# Patient Record
Sex: FEMALE | Race: WHITE | Employment: OTHER | URBAN - METROPOLITAN AREA
[De-identification: names, ages, dates, MRNs, and addresses within clinical notes are randomized per-mention and may not be internally consistent; named-entity substitution may affect disease eponyms.]

---

## 2019-06-17 ENCOUNTER — TELEPHONE (OUTPATIENT)
Dept: GASTROENTEROLOGY | Facility: CLINIC | Age: 64
End: 2019-06-17

## 2019-08-15 ENCOUNTER — TELEPHONE (OUTPATIENT)
Dept: GASTROENTEROLOGY | Facility: CLINIC | Age: 64
End: 2019-08-15

## 2019-08-15 NOTE — TELEPHONE ENCOUNTER
I called patient, her neighbor noticed the yellowing of her eyes several days ago  She reports leg swelling, abd pain at times  She is still taking lactulose, not sure about the xifaxin/diuretics? She sounded somewhat confused during our conversation  She reports only 2 bm's per day  She was last seen 10/2018  I advised she present to ED or she needs to be seen in the office   She agreed to be seen by Sloane Brar tomorrow at 2 pm

## 2019-08-15 NOTE — TELEPHONE ENCOUNTER
Pt left  mssg stating she wants to spk w/ a nurse; asks when she can come in?; both legs are swelling and water pills are not helping; eyes are very yellow; asks for [phone]

## 2019-08-16 ENCOUNTER — OFFICE VISIT (OUTPATIENT)
Dept: GASTROENTEROLOGY | Facility: CLINIC | Age: 64
End: 2019-08-16
Payer: COMMERCIAL

## 2019-08-16 VITALS
SYSTOLIC BLOOD PRESSURE: 124 MMHG | BODY MASS INDEX: 29.95 KG/M2 | HEIGHT: 63 IN | WEIGHT: 169 LBS | HEART RATE: 94 BPM | DIASTOLIC BLOOD PRESSURE: 70 MMHG

## 2019-08-16 DIAGNOSIS — R17 JAUNDICE: ICD-10-CM

## 2019-08-16 DIAGNOSIS — Z12.11 SCREENING FOR COLON CANCER: ICD-10-CM

## 2019-08-16 DIAGNOSIS — K70.11 ASCITES DUE TO ALCOHOLIC HEPATITIS: ICD-10-CM

## 2019-08-16 DIAGNOSIS — K72.90 HEPATIC ENCEPHALOPATHY (HCC): ICD-10-CM

## 2019-08-16 DIAGNOSIS — I85.00 ESOPHAGEAL VARICES WITHOUT BLEEDING, UNSPECIFIED ESOPHAGEAL VARICES TYPE (HCC): ICD-10-CM

## 2019-08-16 DIAGNOSIS — K70.31 ALCOHOLIC CIRRHOSIS OF LIVER WITH ASCITES (HCC): Primary | ICD-10-CM

## 2019-08-16 PROBLEM — K76.82 HEPATIC ENCEPHALOPATHY (HCC): Status: ACTIVE | Noted: 2019-08-16

## 2019-08-16 PROBLEM — K70.30 ALCOHOLIC CIRRHOSIS (HCC): Status: ACTIVE | Noted: 2019-08-16

## 2019-08-16 PROBLEM — R18.8 ASCITES: Status: ACTIVE | Noted: 2019-08-16

## 2019-08-16 PROCEDURE — 99214 OFFICE O/P EST MOD 30 MIN: CPT | Performed by: NURSE PRACTITIONER

## 2019-08-16 RX ORDER — METOPROLOL SUCCINATE 25 MG/1
12.5 TABLET, EXTENDED RELEASE ORAL DAILY
COMMUNITY

## 2019-08-16 RX ORDER — FUROSEMIDE 20 MG/1
20 TABLET ORAL EVERY OTHER DAY
COMMUNITY
End: 2019-09-13 | Stop reason: SDUPTHER

## 2019-08-16 RX ORDER — GABAPENTIN 300 MG/1
300 CAPSULE ORAL
COMMUNITY

## 2019-08-16 RX ORDER — ALPRAZOLAM 0.5 MG/1
TABLET ORAL 3 TIMES DAILY
COMMUNITY

## 2019-08-16 RX ORDER — SPIRONOLACTONE 50 MG/1
50 TABLET, FILM COATED ORAL 2 TIMES DAILY
COMMUNITY

## 2019-08-16 RX ORDER — FLUTICASONE FUROATE AND VILANTEROL 100; 25 UG/1; UG/1
1 POWDER RESPIRATORY (INHALATION) DAILY
COMMUNITY

## 2019-08-16 RX ORDER — PANTOPRAZOLE SODIUM 40 MG/1
40 TABLET, DELAYED RELEASE ORAL 2 TIMES DAILY
COMMUNITY
End: 2019-09-13

## 2019-08-16 RX ORDER — FOLIC ACID 1 MG/1
TABLET ORAL DAILY
COMMUNITY

## 2019-08-16 NOTE — PROGRESS NOTES
[id number] - Outpatient Follow-up Note  Noah Vazquez 58 y o  female MRN: [id number]  Encounter: [id number]    ASSESSMENT AND PLAN:      1  Alcoholic cirrhosis of liver with ascites (HCC)   Decompensated over the past several years with hepatic encephalopathy, small nonbleeding esophageal varices, thrombocytopenia and ascites  Last CT scan in January 2017 showed cirrhosis without any evidence of HCC  A more recent abdominal ultrasound showed a questionable right lobe liver mass but a subsequent MRI was negative for any liver lesions  Alpha fetoprotein level was also within normal range  Unfortunately, she continues to drink at least 3-6 glasses of hard liquor daily and has not been compliant with medications or treatment recommendations  - CBC and differential; Future  - Comprehensive metabolic panel; Future  - Ammonia; Future  - Protime-INR; Future  - AFP tumor marker; Future      2  Ascites due to alcoholic hepatitis  Shifting dullness on today's exam suggest recurrent ascites  She is unclear as to whether she is taking diuretics as advised  3  Jaundice  New onset of jaundice over the past few weeks  I am concerned that she has alcoholic hepatitis  - initially was going to order blood work as an outpatient but the patient is extremely weak, barely able to ambulate and jaundiced and therefore I suggested emergency room visitation for further workup possibly steroids      6  Screening for colon cancer  Last colonoscopy performed in 215 showed internal hemorrhoids only  A 10 year recall advised  Followup Appointment:  4-6 weeks  ______________________________________________________________________    Chief Complaint   Patient presents with    Follow up-cirrhosis, legs are swollen, eye's and skin yellow     HPI:    Density office for worsening jaundiced over the past few weeks    Also has noticed increased abdominal distention and bilateral lower extremity edema   She has been drinking 3-6 alcoholic drinks a day typically vodka  She has been extremely fatigued and weak  She has gained approximately 10 lb over the past several weeks  Denies any pruritus, abdominal pain, nausea, vomiting, melena, rectal bleeding or change in bowel habits    Historical Information   Past Medical History:   Diagnosis Date    Anemia     Anxiety     Ascites     Cervical cancer (HCC)     Chronic pain     Cirrhosis (HCC)     CVA (cerebral vascular accident) ([address] )     Depression     Fibromyalgia     Hepatic encephalopathy (HCC)      Past Surgical History:   Procedure Laterality Date    COLONOSCOPY  03/15/2013    Hemorrhoids    HYSTERECTOMY       Social History     Substance and Sexual Activity   Alcohol Use Yes    Frequency: 2-3 times a week    Comment: Admits to drinking 3-6 glasses of hard liquor day     Social History     Substance and Sexual Activity   Drug Use Not on file     Social History     Tobacco Use   Smoking Status Current Every Day Smoker   Smokeless Tobacco Never Used     Family History   Problem Relation Age of Onset    Cancer Sister     Colon polyps Neg Hx     Colon cancer Neg Hx          Current Outpatient Medications:     ALPRAZolam (XANAX) 0 5 mg tablet    fluticasone-vilanterol (BREO ELLIPTA) 100-25 mcg/inh inhaler    folic acid (FOLVITE) 1 mg tablet    furosemide (LASIX) 20 mg tablet    gabapentin (NEURONTIN) 300 mg capsule    Ipratropium-Albuterol (COMBIVENT IN)    LACTULOSE PO    metoprolol succinate (TOPROL-XL) 25 mg 24 hr tablet    pantoprazole (PROTONIX) 40 mg tablet    rifaximin (XIFAXAN) 550 mg tablet    spironolactone (ALDACTONE) 50 mg tablet  Allergies   Allergen Reactions    Gadolinium     Nsaids     Other Palpitations     All anti depressants       Positive for fatigue, night sweats, sore throat, sinus pain, hoarseness, cough, shortness of breath at rest, shortness of breath on exertion, chest pain at rest, chest pain with exertion, irregular heart rate, palpitations, decreased appetite, easy bruising, prolonged bleeding, painful joints, swollen joints, leg cramps, joint stiffness, muscle aches, chronic pain, itching, discoloration, involuntary movements, numbness/tingling, loss of strength, anxiety, insomnia with the exception of fever, weight loss and swollen glands    PHYSICAL EXAM:    Blood pressure 124/70, pulse 94, height 5' 3" (1 6 m), weight 76 7 kg (169 lb)  Body mass index is 29 94 kg/m²  General Appearance:  Alert, cooperative, no distress  Head:  Normocephalic  Eyes : icteric  Neck: Supple, symmetrical, trachea midline  Lungs: Clear to auscultation bilaterally; no rales, rhonchi or wheezing; respirations unlabored   Heart: Regular rate and rhythm; no murmur, rub, or gallop  Abdomen:   positive for shifting dullness and ventral hernia, normal bowel sounds; no masses, no organomegaly   Rectal:  Deferred   Extremities:  +2 bilateral lower extremity edema   Skin:  jaundice  Back: No CVA tenderness  Neuro:  No asterixis    Lab Results:   No results found for: WBC, HGB, HCT, MCV, PLT  No results found for: NA, K, CL, CO2, ANIONGAP, BUN, CREATININE, GLUCOSE, GLUF, CALCIUM, CORRECTEDCA, AST, ALT, ALKPHOS, PROT, BILITOT, EGFR  No results found for: IRON, TIBC, FERRITIN  No results found for: LIPASE    Radiology Results:   No results found

## 2019-08-22 DIAGNOSIS — K72.90 HEPATIC ENCEPHALOPATHY (HCC): Primary | ICD-10-CM

## 2019-08-22 NOTE — TELEPHONE ENCOUNTER
Carlotta Skiff is non formulary for [employer]    Xifaxin 550 BID was prescribed  We can not PA this med for patient  Need alternative rx please to [facility] ([facility])

## 2019-08-28 NOTE — TELEPHONE ENCOUNTER
Tommy first approved xifaxin 550 mg qty: 68 Duration: 34 days approved for 12 months from 8/26/19-8/26/20

## 2019-09-13 ENCOUNTER — OFFICE VISIT (OUTPATIENT)
Dept: GASTROENTEROLOGY | Facility: CLINIC | Age: 64
End: 2019-09-13
Payer: COMMERCIAL

## 2019-09-13 VITALS
DIASTOLIC BLOOD PRESSURE: 65 MMHG | BODY MASS INDEX: 27.64 KG/M2 | HEART RATE: 69 BPM | HEIGHT: 63 IN | WEIGHT: 156 LBS | SYSTOLIC BLOOD PRESSURE: 118 MMHG

## 2019-09-13 DIAGNOSIS — B37.81 ESOPHAGEAL CANDIDIASIS (HCC): ICD-10-CM

## 2019-09-13 DIAGNOSIS — Z12.11 SCREENING FOR COLON CANCER: ICD-10-CM

## 2019-09-13 DIAGNOSIS — I85.00 ESOPHAGEAL VARICES WITHOUT BLEEDING, UNSPECIFIED ESOPHAGEAL VARICES TYPE (HCC): ICD-10-CM

## 2019-09-13 DIAGNOSIS — K70.31 ALCOHOLIC CIRRHOSIS OF LIVER WITH ASCITES (HCC): Primary | ICD-10-CM

## 2019-09-13 DIAGNOSIS — K72.90 HEPATIC ENCEPHALOPATHY (HCC): ICD-10-CM

## 2019-09-13 PROCEDURE — 99214 OFFICE O/P EST MOD 30 MIN: CPT | Performed by: INTERNAL MEDICINE

## 2019-09-13 RX ORDER — PANTOPRAZOLE SODIUM 40 MG/1
40 TABLET, DELAYED RELEASE ORAL DAILY
Qty: 30 TABLET | Refills: 2 | Status: SHIPPED | OUTPATIENT
Start: 2019-09-13

## 2019-09-13 RX ORDER — FUROSEMIDE 20 MG/1
20 TABLET ORAL 2 TIMES DAILY
Qty: 60 TABLET | Refills: 1 | Status: SHIPPED | OUTPATIENT
Start: 2019-09-13 | End: 2019-09-13

## 2019-09-13 RX ORDER — THIAMINE MONONITRATE (VIT B1) 100 MG
100 TABLET ORAL DAILY
COMMUNITY

## 2019-09-13 NOTE — PROGRESS NOTES
[facility] - Outpatient Follow-up Note  Tom Resendez 58 y o  female MRN: [id number]  Encounter: [id number]    ASSESSMENT AND PLAN:      1  Alcoholic cirrhosis of liver with ascites (HCC)   Decompensated over the past several years with hepatic encephalopathy, small nonbleeding esophageal varices, thrombocytopenia and ascites  Last CT scan in January 2017 showed cirrhosis without any evidence of HCC  A more recent abdominal ultrasound showed a questionable right lobe liver mass but a subsequent MRI was negative for any liver lesions  Alpha fetoprotein level was also within normal range  Recent EGD 8/19 showed portal hypertensive gastropathy and a small esophageal varix in the mid esophagus  Unfortunately, in the past she continued to drink at least 3-6 glasses of hard liquor daily and has not been compliant with medications or treatment recommendations  She recently presented to the hospital with worsening liver disease  She has been abstinent from alcohol since 08/17/2019       - continue Aldactone and Furosemide  - continue Protonix 40 mg daily  - continue Rifaximin and Lactulose    2  Esophageal varices without bleeding, unspecified esophageal varices type ([address] )  Recent EGD 8/19 showed portal hypertensive gastropathy and a small esophageal varix in the mid esophagus  Surveillance upper endoscopy advised in 2 years  - will arrange for an repeat endoscopy in August of 2021      4  Esophageal candidiasis (HCC)    Esophageal candidiasis noted on recent upper endoscopy performed while she was in the hospital  Completed 10 day course of Diflucan  5  Hepatic encephalopathy (HCC)  No evidence of asterixis on today's examination  - continue rifaximin and lactulose    3  Screening for colon cancer  Last colonoscopy performed in 2013 showed internal hemorrhoids only  A 10 year recall advised        Followup Appointment:  4 weeks  ______________________________________________________________________    Chief Complaint   Patient presents with    Follow-up     HPI:    Follow-up to recent hospitalization when she presented with a new onset of jaundice  She had been drinking 4-5 drinks of hard liquor for the past 2 years  She did have a period of sobriety from [id number]  She has not had any alcohol since 8/17 the day prior to going the hospital   She has lost 10 lb over the past few months but attributes this to diuretics  Appetite suboptimal   Denies any nausea, vomiting, hematemesis, rectal bleeding, melena or change in bowel habits      Historical Information   Past Medical History:   Diagnosis Date    Anemia     Anxiety     Ascites     Cervical cancer (HCC)     Chronic pain     Cirrhosis (HCC)     [facility]     Depression     Fibromyalgia     Hepatic encephalopathy [facility]      Past Surgical History:   Procedure Laterality Date    COLONOSCOPY  07/14/2015    COLONOSCOPY  03/15/2013    Hemorrhoids    EGD  12/23/2014    HYSTERECTOMY       Social History     Substance and Sexual Activity   Alcohol Use Not Currently    Comment: Had been drinking anywhere from 3-5 drinks of hard alcohol daily for the past 2 years, quit 8/17     Social History     Substance and Sexual Activity   Drug Use Not on file     Social History     Tobacco Use   Smoking Status Current Every Day Smoker   Smokeless Tobacco Never Used     Family History   Problem Relation Age of Onset    Lung cancer Mother     Cirrhosis Father     Cancer Sister     Colon polyps Neg Hx     Colon cancer Neg Hx          Current Outpatient Medications:     ALPRAZolam (XANAX) 0 5 mg tablet    fluticasone-vilanterol (BREO ELLIPTA) 100-25 mcg/inh inhaler    folic acid (FOLVITE) 1 mg tablet    furosemide (LASIX) 20 mg tablet    gabapentin (NEURONTIN) 300 mg capsule    Ipratropium-Albuterol (COMBIVENT IN)    LACTULOSE PO    metoprolol succinate (TOPROL-XL) 25 mg 24 hr tablet    rifaximin (XIFAXAN) 550 mg tablet    spironolactone (ALDACTONE) 50 mg tablet    thiamine (VITAMIN B1) 100 mg tablet    pantoprazole (PROTONIX) 40 mg tablet  Allergies   Allergen Reactions    Gadolinium     Nsaids     Other Palpitations     All anti depressants     ROS- positive for fatigue, lethargy, weight loss, otherwise 10 point review of systems normal    PHYSICAL EXAM:    Blood pressure 118/65, pulse 69, height 5' 3" (1 6 m), weight 70 8 kg (156 lb)  Body mass index is 27 63 kg/m²  General Appearance: NAD, cooperative, alert  Eyes:  Slightly icteric, PERRLA, EOMI  ENT:  Normocephalic, atraumatic, normal mucosa  Neck:  Supple, symmetrical, trachea midline  Resp:  Clear to auscultation bilaterally; no rales, rhonchi or wheezing; respirations unlabored   CV:  S1 S2, Regular rate and rhythm; no murmur, rub, or gallop  GI:  Soft, non-tender, non-distended; normal bowel sounds; no masses, no organomegaly   Rectal: Deferred  Musculoskeletal: No cyanosis, clubbing or edema  Normal ROM  Skin:  No jaundice, rashes, or lesions   Heme/Lymph: No palpable cervical lymphadenopathy  Psych: Normal affect, good eye contact  Neuro: No gross deficits, AAOx3, no asterixis  Slow to respond to questions    Lab Results:   No results found for: WBC, HGB, HCT, MCV, PLT  No results found for: NA, K, CL, CO2, ANIONGAP, BUN, CREATININE, GLUCOSE, GLUF, CALCIUM, CORRECTEDCA, AST, ALT, ALKPHOS, PROT, BILITOT, EGFR  No results found for: IRON, TIBC, FERRITIN  No results found for: LIPASE    Radiology Results:   No results found

## 2019-10-07 RX ORDER — FUROSEMIDE 20 MG/1
TABLET ORAL
Qty: 60 TABLET | Refills: 1 | Status: SHIPPED | OUTPATIENT
Start: 2019-10-07

## 2019-10-11 LAB
AFP-TM SERPL-MCNC: 7.2 NG/ML (ref 0–8.3)
ALBUMIN SERPL-MCNC: 2.9 G/DL (ref 3.6–4.8)
ALBUMIN/GLOB SERPL: 0.9 {RATIO} (ref 1.2–2.2)
ALP SERPL-CCNC: 98 IU/L (ref 39–117)
ALT SERPL-CCNC: 26 IU/L (ref 0–32)
AMMONIA PLAS-MCNC: 132 UG/DL (ref 19–87)
AST SERPL-CCNC: 56 IU/L (ref 0–40)
BASOPHILS # BLD AUTO: NORMAL 10*3/UL
BILIRUB SERPL-MCNC: 2.2 MG/DL (ref 0–1.2)
BUN SERPL-MCNC: 9 MG/DL (ref 8–27)
BUN/CREAT SERPL: 11 (ref 12–28)
CALCIUM SERPL-MCNC: 9.8 MG/DL (ref 8.7–10.3)
CHLORIDE SERPL-SCNC: 103 MMOL/L (ref 96–106)
CO2 SERPL-SCNC: 28 MMOL/L (ref 20–29)
CREAT SERPL-MCNC: 0.83 MG/DL (ref 0.57–1)
EOSINOPHIL # BLD AUTO: NORMAL 10*3/UL
EOSINOPHIL NFR BLD AUTO: NORMAL %
GLOBULIN SER-MCNC: 3.4 G/DL (ref 1.5–4.5)
GLUCOSE SERPL-MCNC: 140 MG/DL (ref 65–99)
HCT VFR BLD AUTO: NORMAL %
HGB BLD-MCNC: NORMAL G/DL
INR PPP: 1.4 (ref 0.8–1.2)
LYMPHOCYTES # BLD AUTO: NORMAL 10*3/UL
LYMPHOCYTES NFR BLD AUTO: NORMAL %
MONOCYTES NFR BLD AUTO: NORMAL %
NEUTROPHILS NFR BLD AUTO: NORMAL %
PLATELET # BLD AUTO: NORMAL 10*3/UL
POTASSIUM SERPL-SCNC: 3.7 MMOL/L (ref 3.5–5.2)
PROT SERPL-MCNC: 6.3 G/DL (ref 6–8.5)
PROTHROMBIN TIME: 13.8 SEC (ref 9.1–12)
RBC # BLD AUTO: NORMAL 10*6/UL
SL AMB EGFR AFRICAN AMERICAN: 86 ML/MIN/1.73
SL AMB EGFR NON AFRICAN AMERICAN: 75 ML/MIN/1.73
SODIUM SERPL-SCNC: 143 MMOL/L (ref 134–144)
WBC # BLD AUTO: NORMAL X10E3/UL (ref 3.4–10.8)

## 2019-10-17 NOTE — RESULT ENCOUNTER NOTE
Results of blood work to include ammonia given to patient  She has not been taking lactulose for few days  She does tell me that her family has noticed she has been more confused over the past few months  Told her to take lactulose 30 cc to 3 times a day    She has a follow-up appointment with Dr Alvarez in 2 weeks

## 2020-11-08 ENCOUNTER — HOSPITAL ENCOUNTER (EMERGENCY)
Facility: HOSPITAL | Age: 65
Discharge: HOME | End: 2020-11-08
Attending: EMERGENCY MEDICINE | Admitting: EMERGENCY MEDICINE
Payer: MEDICARE

## 2020-11-08 VITALS
WEIGHT: 156 LBS | SYSTOLIC BLOOD PRESSURE: 146 MMHG | RESPIRATION RATE: 20 BRPM | HEART RATE: 78 BPM | TEMPERATURE: 98.3 F | OXYGEN SATURATION: 100 % | BODY MASS INDEX: 27.63 KG/M2 | DIASTOLIC BLOOD PRESSURE: 68 MMHG

## 2020-11-08 DIAGNOSIS — L08.9 WOUND INFECTION: Primary | ICD-10-CM

## 2020-11-08 DIAGNOSIS — T14.8XXA WOUND INFECTION: Primary | ICD-10-CM

## 2020-11-08 PROCEDURE — 99284 EMERGENCY DEPT VISIT MOD MDM: CPT | Performed by: EMERGENCY MEDICINE

## 2020-11-08 PROCEDURE — 99282 EMERGENCY DEPT VISIT SF MDM: CPT

## 2020-11-08 RX ORDER — CEPHALEXIN 500 MG/1
500 CAPSULE ORAL EVERY 8 HOURS SCHEDULED
Qty: 21 CAPSULE | Refills: 0 | Status: SHIPPED | OUTPATIENT
Start: 2020-11-08 | End: 2020-11-15

## 2020-11-23 RX ORDER — SPIRONOLACTONE 50 MG/1
TABLET, FILM COATED ORAL
Qty: 30 TABLET | OUTPATIENT
Start: 2020-11-23

## 2021-01-04 DIAGNOSIS — K70.31 ALCOHOLIC CIRRHOSIS OF LIVER WITH ASCITES (HCC): ICD-10-CM

## 2021-01-05 NOTE — TELEPHONE ENCOUNTER
Dr Sharma Flight - you recently denied patient's spironalactone stating needs OV - please advise regarding furosemide  Thanks

## 2021-02-16 RX ORDER — FUROSEMIDE 20 MG/1
TABLET ORAL
Qty: 60 TABLET | Refills: 1 | OUTPATIENT
Start: 2021-02-16

## 2021-06-17 ENCOUNTER — NURSING HOME VISIT (OUTPATIENT)
Dept: WOUND CARE | Facility: HOSPITAL | Age: 66
End: 2021-06-17
Payer: MEDICARE

## 2021-06-17 DIAGNOSIS — R26.2 AMBULATORY DYSFUNCTION: ICD-10-CM

## 2021-06-17 DIAGNOSIS — Z78.9 PRESENCE OF SURGICAL INCISION: Primary | ICD-10-CM

## 2021-06-17 PROCEDURE — 99304 1ST NF CARE SF/LOW MDM 25: CPT | Performed by: NURSE PRACTITIONER

## 2021-06-17 NOTE — PROGRESS NOTES
[address] [facility]       Patient ID: Jyoti Joyner is a 76 y o  female Date of Birth 1955     Location of Service: [facility]    Performed wound round with: GARCIA      Chief Complaint   Patient presents with    New Patient Visit     Left great toe       Wound Instructions:  Orders Placed This Encounter   Procedures    Wound cleansing and dressings     Left great toe surgical incision  - continue current treatment recommended by surgeon     Standing Status:   Future     Standing Expiration Date:   6/17/2022       Allergies  Gadolinium, Nsaids, and Other      Assessment & Plan:  1  Presence of surgical incision  Assessment & Plan:  Location : Left great toe  S/p left hallux partial digit amputation  - sutured, intact, no obvious sign of infection  - follow up with surgeon next week    Orders:  -     Wound cleansing and dressings; Future    2  Ambulatory dysfunction  Assessment & Plan: On PT             Subjective: This is a referral for wound on the left great toe  As per medical record review the patient had Left hallux partial digit amputation on 6/2021  Severity : no sign of infection  Current treatment - lidocaine and oil emulsion      Patient's care was coordinated with nursing facility staff  Recent vitals, labs and updated medications were reviewed on EMR or chart system of facility   Past Medical, surgical, social, medication and allergy history and patient's previous records were reviewed and updated as appropriate:     Patient Active Problem List   Diagnosis    [facility]    Ascites    Jaundice    Hepatic encephalopathy [facility]    Screening for colon cancer    Esophageal varices [facility]    Esophageal candidiasis ([facility]    Presence of surgical incision    Ambulatory dysfunction     Past Medical History:   Diagnosis Date    Anemia     Anxiety     Ascites     Cervical cancer ([address] )     Chronic pain     Cirrhosis ([facility]     [facility]A [facility]ce[facility] accident) [facility] )     Depression     Fibromyalgia     Hepatic encephalopathy ([facility] )      Past Surgical History:   Procedure Laterality Date    COLONOSCOPY  07/14/2015    COLONOSCOPY  03/15/2013    Hemorrhoids    EGD  12/23/2014    HYSTERECTOMY       Social History     Socioeconomic History    Marital status:      Spouse name: None    Number of children: None    Years of education: None    Highest education level: None   Occupational History    None   Tobacco Use    Smoking status: Current Every Day Smoker    Smokeless tobacco: Never Used   Vaping Use    Vaping Use: Never assessed   Substance and Sexual Activity    Alcohol use: Not Currently     Comment: Had been drinking anywhere from 3-5 drinks of hard alcohol daily for the past 2 years, quit 8/17    Drug use: None    Sexual activity: None   Other Topics Concern    None   Social History Narrative    None     Social Determinants of Health     Financial Resource Strain:     Difficulty of Paying Living Expenses:    Food Insecurity:     Worried About Running Out of Food in the Last Year:     Ran Out of Food in the Last Year:    Transportation Needs:     Lack of Transportation (Medical):      Lack of Transportation (Non-Medical):    Physical Activity:     Days of Exercise per Week:     Minutes of Exercise per Session:    Stress:     Feeling of Stress :    Social Connections:     Frequency of Communication with Friends and Family:     Frequency of Social Gatherings with Friends and Family:     Attends [employer] Services:     Active Member of Clubs or Organizations:     Attends Club or Organization Meetings:     Marital Status:    Intimate Partner Violence:     Fear of Current or Ex-Partner:     Emotionally Abused:     Physically Abused:     Sexually Abused:         Current Outpatient Medications:     ALPRAZolam (XANAX) 0 5 mg tablet, Take by mouth 3 (three) times a day, Disp: , Rfl:     fluticasone-vilanterol (BREO ELLIPTA) 100-25 mcg/inh inhaler, Inhale 1 puff daily Rinse mouth after use , Disp: , Rfl:     folic acid (FOLVITE) 1 mg tablet, Take by mouth daily, Disp: , Rfl:     furosemide (LASIX) 20 mg tablet, Take 20 mg daily alternating with 40 mg daily  , Disp: 60 tablet, Rfl: 1    gabapentin (NEURONTIN) 300 mg capsule, Take 300 mg by mouth daily at bedtime, Disp: , Rfl:     Ipratropium-Albuterol (COMBIVENT IN), Inhale 2 (two) times a day, Disp: , Rfl:     LACTULOSE PO, Take 10 g by mouth daily, Disp: , Rfl:     metoprolol succinate (TOPROL-XL) 25 mg 24 hr tablet, Take 12 5 mg by mouth daily, Disp: , Rfl:     pantoprazole (PROTONIX) 40 mg tablet, Take 1 tablet (40 mg total) by mouth daily, Disp: 30 tablet, Rfl: 2    spironolactone (ALDACTONE) 50 mg tablet, Take 50 mg by mouth 2 (two) times a day, Disp: , Rfl:     thiamine (VITAMIN B1) 100 mg tablet, Take 100 mg by mouth daily, Disp: , Rfl:   Family History   Problem Relation Age of Onset    Lung cancer Mother     Cirrhosis Father     Cancer Sister     Colon polyps Neg Hx     Colon cancer Neg Hx         Review of Systems   Constitutional: Negative  HENT: Negative  Eyes: Negative  Respiratory: Negative  Cardiovascular: Negative for leg swelling  Gastrointestinal: Negative  Endocrine: Negative  Genitourinary: Negative  Musculoskeletal: Positive for gait problem  Skin: Positive for wound  See HPI   Neurological: Negative for dizziness and headaches  Psychiatric/Behavioral: Negative for behavioral problems  Objective: There were no vitals taken for this visit  Physical Exam  Constitutional:       Appearance: Normal appearance  HENT:      Head: Normocephalic and atraumatic  Nose: Nose normal    Eyes:      General:         Right eye: No discharge  Left eye: No discharge  Cardiovascular:      Rate and Rhythm: Normal rate and regular rhythm  Pulses: Normal pulses     Pulmonary:      Effort: Pulmonary effort is normal    Abdominal:      General: Bowel sounds are normal       Palpations: Abdomen is soft  Tenderness: There is no abdominal tenderness  There is no guarding  Musculoskeletal:         General: No tenderness  Cervical back: Normal range of motion  Right lower leg: No edema  Left lower leg: No edema  Comments: LROM    +DP and PT on left foot   Skin:     General: Skin is warm  Findings: Lesion present  Comments: Location [facility] great toe wound bed - 4 cm x 1 cm cm, sutured intact, approximated, exudate - no drainage, no malodor ( assess after dressing removal and cleansing), wound edge - attached to base, periwound - (+) mild redness on the dorsal area  (-) warm, (+) tenderness on palpation on the medial edge on the left great toe  Neurological:      Mental Status: She is alert  Gait: Gait abnormal    Psychiatric:         Mood and Affect: Mood normal          Behavior: Behavior normal               Procedures             Coordination of Care: ADON aware of the treatment plan    Patient / Staff education : Patient was given education on sign of infection  Patient verbalized understanding     Follow up :  Return if symptoms worsen or fail to improve        DANISH Matson

## 2021-06-17 NOTE — ASSESSMENT & PLAN NOTE
Location : Left great toe  S/p left hallux partial digit amputation  - sutured, intact, no obvious sign of infection  - follow up with surgeon next week

## 2021-06-17 NOTE — PATIENT INSTRUCTIONS
Orders Placed This Encounter   Procedures    Wound cleansing and dressings     Left great toe surgical incision  - continue current treatment recommended by surgeon     Standing Status:   Future     Standing Expiration Date:   6/17/2022

## 2021-06-24 ENCOUNTER — NURSING HOME VISIT (OUTPATIENT)
Dept: WOUND CARE | Facility: HOSPITAL | Age: 66
End: 2021-06-24
Payer: MEDICARE

## 2021-06-24 DIAGNOSIS — Z78.9 PRESENCE OF SURGICAL INCISION: Primary | ICD-10-CM

## 2021-06-24 DIAGNOSIS — R26.2 AMBULATORY DYSFUNCTION: ICD-10-CM

## 2021-06-24 PROCEDURE — 99307 SBSQ NF CARE SF MDM 10: CPT | Performed by: NURSE PRACTITIONER

## 2021-06-24 NOTE — ASSESSMENT & PLAN NOTE
Location : Left great toe  S/p left hallux partial digit amputation  - sutured, intact, no obvious sign of infection  - follow up with surgeon this week

## 2021-06-24 NOTE — PROGRESS NOTES
[address] MANAGEMENT   AND [facility]PER[facility]INE [facility]ENTER       Patient ID: Caryle Grams is a 76 y o  female Date of Birth 1955     Location of Service: [facility]    Performed wound round with: GARCIA      Chief Complaint   Patient presents with    Follow Up Wound Care Visit     left great toe       Wound Instructions:  Orders Placed This Encounter   Procedures    Wound cleansing and dressings     Left great toe surgical incision  - continue current treatment recommended by surgeon     Standing Status:   Future     Standing Expiration Date:   6/24/2022       Allergies  Gadolinium, Nsaids, and Other      Assessment & Plan:  1  Presence of surgical incision  Assessment & Plan:  Location : Left great toe  S/p left hallux partial digit amputation  - sutured, intact, no obvious sign of infection  - follow up with surgeon this week    Orders:  -     Wound cleansing and dressings; Future    2  Ambulatory dysfunction  Assessment & Plan:  - on PT             Subjective: This is a follow up with left great toe  As per report, patient have appointment with ortho this week  Patient is still complaining of pain on the left great toe  Patient's care was coordinated with nursing facility staff  Recent vitals, labs and updated medications were reviewed on EMR or chart system of facility   Past Medical, surgical, social, medication and allergy history and patient's previous records were reviewed and updated as appropriate:     Patient Active Problem List   Diagnosis    Alcoholic cirrhosis [facility]    Ascites    Jaundice    Hepatic encephalopathy ([address] )    Screening for colon cancer    Esophageal varices ([facility] )    Esophageal candidiasis ([facility] [address] )    Presence of surgical incision    Ambulatory dysfunction     Past Medical History:   Diagnosis Date    Anemia     Anxiety     Ascites     Cervical cancer ([address] )     Chronic pain     Cirrhosis ([address] )     CVA (cerebral vascular accident) ([address] )     Depression  Fibromyalgia     Hepatic encephalopathy [facility])      Past Surgical History:   Procedure Laterality Date    COLONOSCOPY  07/14/2015    COLONOSCOPY  03/15/2013    Hemorrhoids    EGD  12/23/2014    HYSTERECTOMY       Social History     Socioeconomic History    Marital status:      Spouse name: None    Number of children: None    Years of education: None    Highest education level: None   Occupational History    None   Tobacco Use    Smoking status: Current Every Day Smoker    Smokeless tobacco: Never Used   Vaping Use    Vaping Use: Never assessed   Substance and Sexual Activity    Alcohol use: Not Currently     Comment: Had been drinking anywhere from 3-5 drinks of hard alcohol daily for the past 2 years, quit 8/17    Drug use: None    Sexual activity: None   Other Topics Concern    None   Social History Narrative    None     Social Determinants of Health     Financial Resource Strain:     Difficulty of Paying Living Expenses:    Food Insecurity:     Worried About Running Out of Food in the Last Year:     Ran Out of Food in the Last Year:    Transportation Needs:     Lack of Transportation (Medical):      Lack of Transportation (Non-Medical):    Physical Activity:     Days of Exercise per Week:     Minutes of Exercise per Session:    Stress:     Feeling of Stress :    Social Connections:     Frequency of Communication with Friends and Family:     Frequency of Social Gatherings with Friends and Family:     Attends Hoahaoism Services:     Active Member of Clubs or Organizations:     Attends Club or Organization Meetings:     Marital Status:    Intimate Partner Violence:     Fear of Current or Ex-Partner:     Emotionally Abused:     Physically Abused:     Sexually Abused:         Current Outpatient Medications:     ALPRAZolam (XANAX) 0 5 mg tablet, Take by mouth 3 (three) times a day, Disp: , Rfl:     fluticasone-vilanterol (BREO ELLIPTA) 100-25 mcg/inh inhaler, Inhale 1 puff daily Rinse mouth after use , Disp: , Rfl:     folic acid (FOLVITE) 1 mg tablet, Take by mouth daily, Disp: , Rfl:     furosemide (LASIX) 20 mg tablet, Take 20 mg daily alternating with 40 mg daily  , Disp: 60 tablet, Rfl: 1    gabapentin (NEURONTIN) 300 mg capsule, Take 300 mg by mouth daily at bedtime, Disp: , Rfl:     Ipratropium-Albuterol (COMBIVENT IN), Inhale 2 (two) times a day, Disp: , Rfl:     LACTULOSE PO, Take 10 g by mouth daily, Disp: , Rfl:     metoprolol succinate (TOPROL-XL) 25 mg 24 hr tablet, Take 12 5 mg by mouth daily, Disp: , Rfl:     pantoprazole (PROTONIX) 40 mg tablet, Take 1 tablet (40 mg total) by mouth daily, Disp: 30 tablet, Rfl: 2    spironolactone (ALDACTONE) 50 mg tablet, Take 50 mg by mouth 2 (two) times a day, Disp: , Rfl:     thiamine (VITAMIN B1) 100 mg tablet, Take 100 mg by mouth daily, Disp: , Rfl:   Family History   Problem Relation Age of Onset    Lung cancer Mother     Cirrhosis Father     Cancer Sister     Colon polyps Neg Hx     Colon cancer Neg Hx         Review of Systems   Constitutional: Negative  HENT: Negative  Eyes: Negative  Respiratory: Negative  Cardiovascular: Negative for leg swelling  Gastrointestinal: Negative  Endocrine: Negative  Genitourinary: Negative  Musculoskeletal: Positive for gait problem  Skin: Positive for wound  See HPI   Neurological: Negative for dizziness and headaches  Psychiatric/Behavioral: Negative for behavioral problems  Objective: There were no vitals taken for this visit  Physical Exam  Constitutional:       Appearance: Normal appearance  HENT:      Head: Normocephalic and atraumatic  Nose: Nose normal    Eyes:      General:         Right eye: No discharge  Left eye: No discharge  Pulmonary:      Effort: Pulmonary effort is normal    Abdominal:      General: Bowel sounds are normal       Palpations: Abdomen is soft  Tenderness:  There is no abdominal tenderness  There is no guarding  Musculoskeletal:         General: No tenderness  Cervical back: Normal range of motion  Right lower leg: No edema  Left lower leg: No edema  Comments: LROM    +DP and PT on left foot   Skin:     General: Skin is warm  Findings: Lesion present  Comments: Location Left great toe wound bed - 4 cm x 1 cm cm, sutured intact, approximated, exudate - no drainage, no malodor ( assess after dressing removal and cleansing), wound edge - attached to base, periwound - (+) mild redness on the dorsal area  (-) warm, (+) tenderness on palpation on the medial edge on the left great toe with hypergranulation  Neurological:      Mental Status: She is alert  Gait: Gait abnormal    Psychiatric:         Mood and Affect: Mood normal          Behavior: Behavior normal               Procedures               Coordination of Care: ADON aware of the treatment plan    Follow up :  Return if symptoms worsen or fail to improve        DANISH Vazquez

## 2021-06-24 NOTE — PATIENT INSTRUCTIONS
Orders Placed This Encounter   Procedures    Wound cleansing and dressings     Left great toe surgical incision  - continue current treatment recommended by surgeon     Standing Status:   Future     Standing Expiration Date:   6/24/2022

## 2021-07-01 ENCOUNTER — NURSING HOME VISIT (OUTPATIENT)
Dept: WOUND CARE | Facility: HOSPITAL | Age: 66
End: 2021-07-01
Payer: MEDICARE

## 2021-07-01 DIAGNOSIS — R26.2 AMBULATORY DYSFUNCTION: ICD-10-CM

## 2021-07-01 DIAGNOSIS — Z78.9 PRESENCE OF SURGICAL INCISION: Primary | ICD-10-CM

## 2021-07-01 PROCEDURE — 99307 SBSQ NF CARE SF MDM 10: CPT | Performed by: NURSE PRACTITIONER

## 2021-07-01 NOTE — PATIENT INSTRUCTIONS
Orders Placed This Encounter   Procedures    Wound cleansing and dressings     Left great toe surgical incision  - continue current treatment recommended by surgeon  - sign off     Standing Status:   Future     Standing Expiration Date:   7/1/2022

## 2021-07-01 NOTE — ASSESSMENT & PLAN NOTE
Location : Left great toe  S/p left hallux partial digit amputation  - sutured, intact, no obvious sign of infection  - patient is for discharge to home, will continue to follow up with her surgeon  - sign off

## 2021-07-01 NOTE — PROGRESS NOTES
[address] MANAGEMENT   AND [facility]PERBARIC MED[facility]INE [facility]ENTER       Patient ID: Jarret Lopez is a 76 y o  female Date of Birth 1955     Location of Service: [facility]    Performed wound round with: ALLISON      Chief Complaint   Patient presents with    Follow Up Wound Care Visit     Left foot hallux       Wound Instructions:  Orders Placed This Encounter   Procedures    Wound cleansing and dressings     Left great toe surgical incision  - continue current treatment recommended by surgeon  - sign off     Standing Status:   Future     Standing Expiration Date:   7/1/2022       Allergies  Gadolinium, Nsaids, and Other      Assessment & Plan:  1  Presence of surgical incision  Assessment & Plan:  Location : Left great toe  S/p left hallux partial digit amputation  - sutured, intact, no obvious sign of infection  - patient is for discharge to home, will continue to follow up with her surgeon  - sign off    Orders:  -     Wound cleansing and dressings; Future    2  Ambulatory dysfunction  Assessment & Plan:  - ambulate independently             Subjective: This is a follow up with left great toe  As per patient, she was recently seen by ortho and the wound was cauterized  Denies pain  Patient's care was coordinated with nursing facility staff  Recent vitals, labs and updated medications were reviewed on EMR or chart system of facility   Past Medical, surgical, social, medication and allergy history and patient's previous records were reviewed and updated as appropriate:     Patient Active Problem List   Diagnosis    Alcoholic cirrhosis ([facility] )    Ascites    Jaundice    Hepatic encephalopathy ([facility] [address] [facility] )    Screening for colon cancer    Esophageal varices ([facility] [facility] )    Esophageal candidiasis ([facility] [facility] )    Presence of surgical incision    Ambulatory dysfunction     Past Medical History:   Diagnosis Date    Anemia     Anxiety     Ascites     Cervical cancer ([facility] [address] [facility] )     Chronic pain     Cirrhosis ([facility] [facility] )     CVA [facility]cerebral vascular accident) ([facility] )     Depression     Fibromyalgia     Hepatic encephalopathy ([facility] )      Past Surgical History:   Procedure Laterality Date    COLONOSCOPY  07/14/2015    COLONOSCOPY  03/15/2013    Hemorrhoids    EGD  12/23/2014    HYSTERECTOMY       Social History     Socioeconomic History    Marital status:      Spouse name: None    Number of children: None    Years of education: None    Highest education level: None   Occupational History    None   Tobacco Use    Smoking status: Current Every Day Smoker    Smokeless tobacco: Never Used   Vaping Use    Vaping Use: Never assessed   Substance and Sexual Activity    Alcohol use: Not Currently     Comment: Had been drinking anywhere from 3-5 drinks of hard alcohol daily for the past 2 years, quit 8/17    Drug use: None    Sexual activity: None   Other Topics Concern    None   Social History Narrative    None     Social Determinants of Health     Financial Resource Strain:     Difficulty of Paying Living Expenses:    Food Insecurity:     Worried About Running Out of Food in the Last Year:     Ran Out of Food in the Last Year:    Transportation Needs:     Lack of Transportation (Medical):      Lack of Transportation (Non-Medical):    Physical Activity:     Days of Exercise per Week:     Minutes of Exercise per Session:    Stress:     Feeling of Stress :    Social Connections:     Frequency of Communication with Friends and Family:     Frequency of Social Gatherings with Friends and Family:     Attends Latter day Services:     Active Member of Clubs or Organizations:     Attends Club or Organization Meetings:     Marital Status:    Intimate Partner Violence:     Fear of Current or Ex-Partner:     Emotionally Abused:     Physically Abused:     Sexually Abused:         Current Outpatient Medications:     ALPRAZolam (XANAX) 0 5 mg tablet, Take by mouth 3 (three) times a day, Disp: , Rfl:     fluticasone-vilanterol (BREO ELLIPTA) 100-25 mcg/inh inhaler, Inhale 1 puff daily Rinse mouth after use , Disp: , Rfl:     folic acid (FOLVITE) 1 mg tablet, Take by mouth daily, Disp: , Rfl:     furosemide (LASIX) 20 mg tablet, Take 20 mg daily alternating with 40 mg daily  , Disp: 60 tablet, Rfl: 1    gabapentin (NEURONTIN) 300 mg capsule, Take 300 mg by mouth daily at bedtime, Disp: , Rfl:     Ipratropium-Albuterol (COMBIVENT IN), Inhale 2 (two) times a day, Disp: , Rfl:     LACTULOSE PO, Take 10 g by mouth daily, Disp: , Rfl:     metoprolol succinate (TOPROL-XL) 25 mg 24 hr tablet, Take 12 5 mg by mouth daily, Disp: , Rfl:     pantoprazole (PROTONIX) 40 mg tablet, Take 1 tablet (40 mg total) by mouth daily, Disp: 30 tablet, Rfl: 2    spironolactone (ALDACTONE) 50 mg tablet, Take 50 mg by mouth 2 (two) times a day, Disp: , Rfl:     thiamine (VITAMIN B1) 100 mg tablet, Take 100 mg by mouth daily, Disp: , Rfl:   Family History   Problem Relation Age of Onset    Lung cancer Mother     Cirrhosis Father     Cancer Sister     Colon polyps Neg Hx     Colon cancer Neg Hx         Review of Systems   Constitutional: Negative  HENT: Negative  Eyes: Negative  Respiratory: Negative  Cardiovascular: Negative for leg swelling  Gastrointestinal: Negative  Endocrine: Negative  Genitourinary: Negative  Musculoskeletal: Positive for gait problem  Skin: Positive for wound  See HPI   Neurological: Negative for dizziness and headaches  Psychiatric/Behavioral: Negative for behavioral problems  Objective: There were no vitals taken for this visit  Physical Exam  Constitutional:       Appearance: Normal appearance  HENT:      Head: Normocephalic and atraumatic  Nose: Nose normal    Eyes:      General:         Right eye: No discharge  Left eye: No discharge     Pulmonary:      Effort: Pulmonary effort is normal    Abdominal:      General: Bowel sounds are normal       Palpations: Abdomen is soft       Tenderness: There is no abdominal tenderness  There is no guarding  Musculoskeletal:         General: No tenderness  Cervical back: Normal range of motion  Right lower leg: No edema  Left lower leg: No edema  Comments: +DP and PT on left foot   Skin:     General: Skin is warm  Findings: Lesion present  Comments: Location Left great toe wound bed - 4 cm x 1 cm cm, sutured intact, approximated, exudate - no drainage, no malodor ( assess after dressing removal and cleansing), wound edge - attached to base, periwound - no redness  (-) warm, (-) tenderness on palpation      Neurological:      Mental Status: She is alert  Gait: Gait abnormal    Psychiatric:         Mood and Affect: Mood normal          Behavior: Behavior normal               Procedures               Coordination of Care: DON aware of the treatment plan    Follow up :  Return if symptoms worsen or fail to improve        DANISH Glasgow

## 2021-07-08 ENCOUNTER — NURSING HOME VISIT (OUTPATIENT)
Dept: WOUND CARE | Facility: HOSPITAL | Age: 66
End: 2021-07-08
Payer: MEDICARE

## 2021-07-08 DIAGNOSIS — R26.2 AMBULATORY DYSFUNCTION: ICD-10-CM

## 2021-07-08 DIAGNOSIS — Z78.9 PRESENCE OF SURGICAL INCISION: Primary | ICD-10-CM

## 2021-07-08 PROCEDURE — 99307 SBSQ NF CARE SF MDM 10: CPT | Performed by: NURSE PRACTITIONER

## 2021-07-08 NOTE — ASSESSMENT & PLAN NOTE
Location : Left great toe  S/p left hallux partial digit amputation  - sutured, intact, no obvious sign of infection  - Follow up with surgeon for suture removal

## 2021-07-08 NOTE — PROGRESS NOTES
[address] MANAGEMENT   AND HYPERBARIC MEDICINE CENTER       Patient ID: Noah Vazquez is a 76 y o  female Date of Birth 1955     Location of Service: [facility]    Performed wound round with: GARCIA      Chief Complaint   Patient presents with    Follow Up Wound Care Visit     Left foot hallux       Wound Instructions:  Orders Placed This Encounter   Procedures    Wound cleansing and dressings      Left great toe surgical incision  - continue current treatment recommended by surgeon     Standing Status:   Future     Standing Expiration Date:   7/8/2022       Allergies  Gadolinium, Nsaids, and Other      Assessment & Plan:  1  Presence of surgical incision  Assessment & Plan:  Location : Left great toe  S/p left hallux partial digit amputation  - sutured, intact, no obvious sign of infection  - Follow up with surgeon for suture removal    Orders:  -     Wound cleansing and dressings; Future    2  Ambulatory dysfunction  Assessment & Plan:  - ambulate independently             Subjective: This is a follow up with left great toe  Patient's discharge was cancelled  No significant issues related to the wound  Patient's care was coordinated with nursing facility staff  Recent vitals, labs and updated medications were reviewed on EMR or chart system of facility   Past Medical, surgical, social, medication and allergy history and patient's previous records were reviewed and updated as appropriate:     Patient Active Problem List   Diagnosis    Alcoholic cirrhosis ([facility] [address] )    Ascites    Jaundice    Hepatic encephalopathy ([facility] [address] [facility] )    Screening for colon cancer    Esophageal varices ([facility] [address] [facility]    Esophageal candidiasis ([facility] [address] [facility]    Presence of surgical incision    Ambulatory dysfunction     Past Medical History:   Diagnosis Date    Anemia     Anxiety     Ascites     Cervical cancer ([facility] [address] [facility]     Chronic pain     Cirrhosis ([facility] [address] [facility]     CVA (cerebral vascular accident) ([facility] [facility] )     Depression     Fibromyalgia  Hepatic encephalopathy [facility])      Past Surgical History:   Procedure Laterality Date    COLONOSCOPY  07/14/2015    COLONOSCOPY  03/15/2013    Hemorrhoids    EGD  12/23/2014    HYSTERECTOMY       Social History     Socioeconomic History    Marital status:      Spouse name: None    Number of children: None    Years of education: None    Highest education level: None   Occupational History    None   Tobacco Use    Smoking status: Current Every Day Smoker    Smokeless tobacco: Never Used   Vaping Use    Vaping Use: Never assessed   Substance and Sexual Activity    Alcohol use: Not Currently     Comment: Had been drinking anywhere from 3-5 drinks of hard alcohol daily for the past 2 years, quit 8/17    Drug use: None    Sexual activity: None   Other Topics Concern    None   Social History Narrative    None     Social Determinants of Health     Financial Resource Strain:     Difficulty of Paying Living Expenses:    Food Insecurity:     Worried About Running Out of Food in the Last Year:     Ran Out of Food in the Last Year:    Transportation Needs:     Lack of Transportation (Medical):      Lack of Transportation (Non-Medical):    Physical Activity:     Days of Exercise per Week:     Minutes of Exercise per Session:    Stress:     Feeling of Stress :    Social Connections:     Frequency of Communication with Friends and Family:     Frequency of Social Gatherings with Friends and Family:     Attends Scientologist Services:     Active Member of Clubs or Organizations:     Attends Club or Organization Meetings:     Marital Status:    Intimate Partner Violence:     Fear of Current or Ex-Partner:     Emotionally Abused:     Physically Abused:     Sexually Abused:         Current Outpatient Medications:     ALPRAZolam (XANAX) 0 5 mg tablet, Take by mouth 3 (three) times a day, Disp: , Rfl:     fluticasone-vilanterol (BREO ELLIPTA) 100-25 mcg/inh inhaler, Inhale 1 puff daily Rinse mouth after use , Disp: , Rfl:     folic acid (FOLVITE) 1 mg tablet, Take by mouth daily, Disp: , Rfl:     furosemide (LASIX) 20 mg tablet, Take 20 mg daily alternating with 40 mg daily  , Disp: 60 tablet, Rfl: 1    gabapentin (NEURONTIN) 300 mg capsule, Take 300 mg by mouth daily at bedtime, Disp: , Rfl:     Ipratropium-Albuterol (COMBIVENT IN), Inhale 2 (two) times a day, Disp: , Rfl:     LACTULOSE PO, Take 10 g by mouth daily, Disp: , Rfl:     metoprolol succinate (TOPROL-XL) 25 mg 24 hr tablet, Take 12 5 mg by mouth daily, Disp: , Rfl:     pantoprazole (PROTONIX) 40 mg tablet, Take 1 tablet (40 mg total) by mouth daily, Disp: 30 tablet, Rfl: 2    spironolactone (ALDACTONE) 50 mg tablet, Take 50 mg by mouth 2 (two) times a day, Disp: , Rfl:     thiamine (VITAMIN B1) 100 mg tablet, Take 100 mg by mouth daily, Disp: , Rfl:   Family History   Problem Relation Age of Onset    Lung cancer Mother     Cirrhosis Father     Cancer Sister     Colon polyps Neg Hx     Colon cancer Neg Hx         Review of Systems   Constitutional: Negative  HENT: Negative  Eyes: Negative  Respiratory: Negative  Cardiovascular: Negative for leg swelling  Gastrointestinal: Negative  Endocrine: Negative  Genitourinary: Negative  Musculoskeletal: Positive for gait problem  Skin: Positive for wound  See HPI   Neurological: Negative for dizziness and headaches  Psychiatric/Behavioral: Negative for behavioral problems  Objective: There were no vitals taken for this visit  Physical Exam  Constitutional:       Appearance: Normal appearance  HENT:      Head: Normocephalic and atraumatic  Nose: Nose normal    Eyes:      General:         Right eye: No discharge  Left eye: No discharge  Pulmonary:      Effort: Pulmonary effort is normal    Abdominal:      General: Bowel sounds are normal       Palpations: Abdomen is soft  Tenderness: There is no abdominal tenderness   There is no guarding  Musculoskeletal:         General: No tenderness  Cervical back: Normal range of motion  Right lower leg: No edema  Left lower leg: No edema  Comments: +DP and PT on left foot   Skin:     General: Skin is warm  Findings: Lesion present  Comments: Location Left great toe wound bed - 4 cm x 1 cm cm, sutured intact, approximated, exudate - no drainage, no malodor ( assess after dressing removal and cleansing), wound edge - attached to base, periwound - no redness  (-) warm, (-) tenderness on palpation      Neurological:      Mental Status: She is alert  Gait: Gait abnormal    Psychiatric:         Mood and Affect: Mood normal          Behavior: Behavior normal               Procedures               Coordination of Care: ADON aware of the treatment plan    Follow up :  Return if symptoms worsen or fail to improve        DANISH Mcknight

## 2022-11-07 PROBLEM — F17.200 TOBACCO USE DISORDER: Status: ACTIVE | Noted: 2022-11-07

## 2022-11-07 PROBLEM — F33.1 MAJOR DEPRESSIVE DISORDER, RECURRENT EPISODE, MODERATE (HCC): Status: ACTIVE | Noted: 2022-11-07

## 2022-11-07 PROBLEM — F41.1 GENERALIZED ANXIETY DISORDER: Status: ACTIVE | Noted: 2022-11-07

## 2022-11-07 PROBLEM — F11.21 OPIOID DEPENDENCE IN REMISSION (HCC): Status: ACTIVE | Noted: 2022-11-07

## 2022-11-09 ENCOUNTER — TELEMEDICINE (OUTPATIENT)
Dept: PSYCHIATRY | Facility: CLINIC | Age: 67
End: 2022-11-09

## 2022-11-09 DIAGNOSIS — F17.200 TOBACCO USE DISORDER: ICD-10-CM

## 2022-11-09 DIAGNOSIS — F11.21 OPIOID DEPENDENCE IN REMISSION (HCC): ICD-10-CM

## 2022-11-09 DIAGNOSIS — F33.1 MAJOR DEPRESSIVE DISORDER, RECURRENT EPISODE, MODERATE (HCC): Primary | ICD-10-CM

## 2022-11-09 DIAGNOSIS — F41.1 GENERALIZED ANXIETY DISORDER: ICD-10-CM

## 2022-11-09 RX ORDER — ALPRAZOLAM 0.5 MG/1
TABLET ORAL
Qty: 60 TABLET | Refills: 4 | Status: SHIPPED | OUTPATIENT
Start: 2022-11-09

## 2022-11-09 NOTE — BH TREATMENT PLAN
TREATMENT PLAN (Medication Management Only)        Ibrahima Mariano    Name and Date of Birth: Roshni Nuñez 75 y o  1955  Date of Treatment Plan: November 9, 2022  Diagnosis/Diagnoses:    1  Major depressive disorder, recurrent episode, moderate ([facility] [address] )    2  Generalized anxiety disorder    3  Opio[facility] dependence in remission ([facility] )    4  Tobacco use disorder      Strengths/Personal Resources for Self-Care: supportive family, supportive friends  Area/Areas of need (in own words): anxiety  1  Long Term Goal: maintain anxiety  Target Date:6 months - 5/9/2023  Person/Persons responsible for completion of goal: Yeni Akins  2  Short Term Objective (s) - How will we reach this goal?:   A  Provider new recommended medication/dosage changes and/or continue medication(s): continue current medications as prescribed Xanax  B  N/A   C  N/A  Target Date:6 months - 5/9/2023  Person/Persons Responsible for Completion of Goal: Yeni Akins  Progress Towards Goals: stable  Treatment Modality: medication management every 4 months  Review due 180 days from date of this plan: 6 months - 5/9/2023  Expected length of service: maintenance  My Physician/PA/NP and I have developed this plan together and I agree to work on the goals and objectives  I understand the treatment goals that were developed for my treatment

## 2022-11-09 NOTE — PSYCH
Virtual Regular Visit    Verification of patient location: at home    Patient is located in the following state in which I hold an active license [place]      Assessment/Plan:       Diagnoses and all orders for this visit:    Major depressive disorder, recurrent episode, moderate (HCC)    Generalized anxiety disorder    Opioid dependence in remission ([facility] [address] )    Tobacco use disorder          Goals addressed in session:   Good Health  Counseling provided:      Treatment Recommendations- Risks Benefits       Immediate Medical/Psychiatric/Psychotherapy Treatments and Any Precautions:     Risks, Benefits And Possible Side Effects Of Medications:  Risks, benefits, and possible side effects of medications explained to patient and patient verbalizes understanding    Controlled Medication Discussion: The patient has been filling controlled prescriptions on time as prescribed to [facility]      Reason for visit is No chief complaint on file  Medication Management   Encounter provider DANISH Sánchez    Provider located at [address] [facility]  [address]  [phone]      Recent Visits  No visits were found meeting these conditions  Showing recent visits within past 7 days and meeting all other requirements  Today's Visits  Date Type Provider Dept   11/09/22 Telemedicine Madhuri Jerry [facility] today's visits and meeting all other requirements  Future Appointments  No visits were found meeting these conditions  Showing future appointments within next 150 days and meeting all other requirements       The patient was identified by name and date of birth  Olivia Cloud was informed that this is a telemedicine visit and that the visit is being conducted throughTelephone  My office door was closed  No one else was in the room    She acknowledged consent and understanding of privacy and security of the video platform  The patient has agreed to participate and understands they can discontinue the visit at any time  Patient is aware this is a billable service  Subjective    Pramod Vences is a 75 y o  female    here today for a med check  This was via phone as she does not have access to [employer]    normal appetite      HPI  Mood down as she has not felt well physically  Anxiety manageable  Son coming home with girl friend for Thanksgiving  No problems with medication  "It helps"  Appetite improving  Sleep ok  Has some health issues   Denies SI/HI    Past Medical History:   Diagnosis Date   • Anemia    • Anxiety    • Ascites    • Cervical cancer (HCC)    • Chronic pain    • Cirrhosis ([facility] [address] )    • CVA (cerebral vascular accident) ([facility] [address] )    • Depression    • Fibromyalgia    • [facility]        Past Surgical History:   Procedure Laterality Date   • COLONOSCOPY  07/14/2015   • COLONOSCOPY  03/15/2013    Hemorrhoids   • EGD  12/23/2014   • HYSTERECTOMY         Current Outpatient Medications   Medication Sig Dispense Refill   • ALPRAZolam (XANAX) 0 5 mg tablet Take by mouth 3 (three) times a day Take 1 PO BID     • fluticasone-vilanterol (BREO ELLIPTA) 100-25 mcg/inh inhaler Inhale 1 puff daily Rinse mouth after use  • folic acid (FOLVITE) 1 mg tablet Take by mouth daily     • furosemide (LASIX) 20 mg tablet Take 20 mg daily alternating with 40 mg daily   60 tablet 1   • gabapentin (NEURONTIN) 300 mg capsule Take 300 mg by mouth daily at bedtime     • Ipratropium-Albuterol (COMBIVENT IN) Inhale 2 (two) times a day     • LACTULOSE PO Take 10 g by mouth daily     • metoprolol succinate (TOPROL-XL) 25 mg 24 hr tablet Take 12 5 mg by mouth daily     • pantoprazole (PROTONIX) 40 mg tablet Take 1 tablet (40 mg total) by mouth daily 30 tablet 2   • spironolactone (ALDACTONE) 50 mg tablet Take 50 mg by mouth 2 (two) times a day     • thiamine (VITAMIN B1) 100 mg tablet Take 100 mg by mouth daily       No current facility-administered medications for this visit  Allergies   Allergen Reactions   • Gadolinium    • Nsaids    • Other Palpitations     All anti depressants       Social History     Substance and Sexual Activity   Drug Use Not on file       Family History   Problem Relation Age of Onset   • Lung cancer Mother    • Cirrhosis Father    • Cancer Sister    • Colon polyps Neg Hx    • Colon cancer Neg Hx            Objective    Mental status:  Appearance calm and cooperative  and adequate hygiene and grooming   Mood mood appropriate   Affect affect appropriate    Speech a normal rate and fluent   Thought Processes normal thought processes   Hallucinations no hallucinations present    Thought Content no delusions   Abnormal Thoughts no suicidal thoughts  and no homicidal thoughts    Orientation  oriented to person and place and time   Remote Memory short term memory intact and long term memory intact   Attention Span concentration intact   Intellect Appears to be of Average Intelligence   Insight Insight intact   Judgement judgment was intact   Muscle Strength Muscle strength and tone were normal and Normal gait    Language no difficulty naming common objects   Fund of Knowledge displays adequate knowledge of current events   Pain none   Pain Scale 0       Video Exam    There were no vitals filed for this visit      I spent 15 minutes directly with the patient during this visit    Patient Instructions   Continue Current Tx  Aware she will have to be seen in person at   F/U with medical care  Report Problems  Return 4 months       Visit Time    Visit Start Time: 1174  Visit Stop Time: 9052  Total Visit Duration: 22 min

## 2023-03-01 ENCOUNTER — TELEPHONE (OUTPATIENT)
Dept: PSYCHIATRY | Facility: CLINIC | Age: 68
End: 2023-03-01

## 2023-03-01 ENCOUNTER — DOCUMENTATION (OUTPATIENT)
Dept: PSYCHIATRY | Facility: CLINIC | Age: 68
End: 2023-03-01

## 2023-03-01 NOTE — PSYCH
[facility]    Patient Name Brittaney Abrams     Date of Birth: 75 y o  1955      MRN: [id number]    Admission Date: several years ago    Date of Transfer: March 1, 2023    Admission Diagnosis:     Major Depressive Disorder, recurrent, moderate  Generalized Anxiety Disorder    Current Diagnosis:     No diagnosis found  Reason for Admission: Jermaine Benitez presented for treatment due to depression  Primary complaints included DEPRESSIVE SYMPTOMS: unremarkable - euthymic mood  Progress in Treatment: Jermaine Benitez was seen for Medication Management  During the course of treatment she      Episodes of Higher Level of Care: No    Transfer request Initiated by: Psychiatrist: Nurse Practitioner Pramod Finney Therapist: None    Reason for Transfer Request: patient requested transfer    Does this individual need a clinician with specialized training/expertise?: No    Is this client working with any other \[facility]\"" Providers/Therapists?  Psychiatrist: None Therapist: None    Other pertinent issues: None    Are there any specific individuals who would be a “best fit” or who have already agreed to accept this transfer request?      Psychiatrist: None   Therapist: None  Rationale: Not Applicable    Attempts to maintain the current therapeutic relationship: Not Applicable    Transfer request routed to Clinical Coordinator for input and/or approval      Comments from other involved providers and/or clinical coordinator: None    Pramod Finney, CRNP03/01/23

## 2023-03-23 NOTE — PATIENT INSTRUCTIONS
Orders Placed This Encounter   Procedures    Wound cleansing and dressings      Left great toe surgical incision  - continue current treatment recommended by surgeon     Standing Status:   Future     Standing Expiration Date:   7/8/2022 no

## 2023-05-19 ENCOUNTER — TELEPHONE (OUTPATIENT)
Dept: PSYCHIATRY | Facility: CLINIC | Age: 68
End: 2023-05-19

## 2023-05-19 DIAGNOSIS — F41.1 GENERALIZED ANXIETY DISORDER: ICD-10-CM

## 2023-05-19 DIAGNOSIS — F33.1 MAJOR DEPRESSIVE DISORDER, RECURRENT EPISODE, MODERATE (HCC): ICD-10-CM

## 2023-05-19 NOTE — TELEPHONE ENCOUNTER
Voicemail message from client stating she deleted the voicemail message about setting up a med check with an in office provider  She is out of her medication  An outreach was done on 3/1/23 by the intake dept  I forwarded message to intake to set up an appointment, so refills can be sent

## 2023-05-19 NOTE — TELEPHONE ENCOUNTER
Patient in need of Alprazolam RF  Last script written 2023- over6 months old=  Last fill per PDMP:   Patient in need of in person provider and this has been brought to the attention of intake as patient has no access to video sessions  Requesting RF of med to hold over in the interim

## 2023-05-22 ENCOUNTER — DOCUMENTATION (OUTPATIENT)
Dept: PSYCHIATRY | Facility: CLINIC | Age: 68
End: 2023-05-22

## 2023-05-22 ENCOUNTER — TELEPHONE (OUTPATIENT)
Dept: PSYCHIATRY | Facility: CLINIC | Age: 68
End: 2023-05-22

## 2023-05-22 RX ORDER — ALPRAZOLAM 0.5 MG/1
TABLET ORAL
Qty: 60 TABLET | Refills: 0 | Status: SHIPPED | OUTPATIENT
Start: 2023-05-22

## 2023-05-22 NOTE — PSYCH
[facility]    Patient Name Jesus Mcmullen     Date of Birth: 75 y o  1955      MRN: [id number]    Admission Date: several years ago    Date of Transfer: May 22, 2023    Admission Diagnosis:     Major Depressive Disorder  Generalized Anxiety Disorder    Current Diagnosis:     No diagnosis found  Reason for Admission: Monique Wolfe presented for treatment due to depression and anxiety  Primary complaints included DEPRESSIVE SYMPTOMS: unremarkable - euthymic mood and ANXIETY SYMPTOMS: unremarkable  Progress in Treatment: Monique Wolfe was seen for Medication Management  During the course of treatment she      Episodes of Higher Level of Care: No    Transfer request Initiated by: Psychiatrist: Boris Padron Therapist: None    Reason for Transfer Request: patient requested transfer    Does this individual need a clinician with specialized training/expertise?: No    Is this client working with any other [facility] Providers/Therapists?  Psychiatrist: None Therapist: None    Other pertinent issues: None    Are there any specific individuals who would be a “best fit” or who have already agreed to accept this transfer request?      Psychiatrist: None   Therapist: None  Rationale: Not Applicable    Attempts to maintain the current therapeutic relationship: Not Applicable    Transfer request routed to Clinical Coordinator for input and/or approval      Comments from other involved providers and/or clinical coordinator: None    DANISH Pal05/22/23

## 2023-05-22 NOTE — TELEPHONE ENCOUNTER
Lvm regarding wait list for in person providers  clt was added to wait list and script was sent in for refill until apt can be made

## 2023-07-06 ENCOUNTER — TELEPHONE (OUTPATIENT)
Dept: PSYCHIATRY | Facility: CLINIC | Age: 68
End: 2023-07-06

## 2023-07-31 DIAGNOSIS — F33.1 MAJOR DEPRESSIVE DISORDER, RECURRENT EPISODE, MODERATE (HCC): ICD-10-CM

## 2023-07-31 DIAGNOSIS — F41.1 GENERALIZED ANXIETY DISORDER: ICD-10-CM

## 2023-07-31 RX ORDER — ALPRAZOLAM 0.5 MG/1
TABLET ORAL
Qty: 60 TABLET | Refills: 0 | Status: SHIPPED | OUTPATIENT
Start: 2023-07-31 | End: 2023-09-08 | Stop reason: SDUPTHER

## 2023-08-02 NOTE — PATIENT INSTRUCTIONS
Continue Current Tx  Aware she will have to be seen in person at [facility]  F/U with medical care  Report Problems  Return 4 months NONE

## 2023-08-30 ENCOUNTER — TELEPHONE (OUTPATIENT)
Dept: PSYCHIATRY | Facility: CLINIC | Age: 68
End: 2023-08-30

## 2023-08-30 NOTE — TELEPHONE ENCOUNTER
Tried to contact clt regarding scheduling with a new provider. t mailbox is full and unable to leave a message.

## 2023-09-06 DIAGNOSIS — F33.1 MAJOR DEPRESSIVE DISORDER, RECURRENT EPISODE, MODERATE (HCC): ICD-10-CM

## 2023-09-06 DIAGNOSIS — F41.1 GENERALIZED ANXIETY DISORDER: ICD-10-CM

## 2023-09-08 ENCOUNTER — TELEPHONE (OUTPATIENT)
Dept: PSYCHIATRY | Facility: CLINIC | Age: 68
End: 2023-09-08

## 2023-09-08 DIAGNOSIS — F41.1 GENERALIZED ANXIETY DISORDER: ICD-10-CM

## 2023-09-08 DIAGNOSIS — F33.1 MAJOR DEPRESSIVE DISORDER, RECURRENT EPISODE, MODERATE (HCC): ICD-10-CM

## 2023-09-08 RX ORDER — ALPRAZOLAM 0.5 MG/1
TABLET ORAL
Qty: 60 TABLET | Refills: 0 | Status: SHIPPED | OUTPATIENT
Start: 2023-09-08

## 2023-09-08 NOTE — TELEPHONE ENCOUNTER
Pt is having a hard time as she's been out of this medication     We can get a refill sent before her appt next week?

## 2023-09-08 NOTE — TELEPHONE ENCOUNTER
LVM regarding scheduling with a new provider. Clt needs to be seen scheduled before refill can be sent in.

## 2023-09-11 RX ORDER — ALPRAZOLAM 0.5 MG/1
TABLET ORAL
Qty: 60 TABLET | OUTPATIENT
Start: 2023-09-11

## 2023-09-11 NOTE — TELEPHONE ENCOUNTER
Clt was scheduled for apt, I believe you were already notified by Gene Miguel on Friday. Just wanted to close the door on this one.

## 2023-09-18 ENCOUNTER — OFFICE VISIT (OUTPATIENT)
Dept: PSYCHIATRY | Facility: CLINIC | Age: 68
End: 2023-09-18
Payer: MEDICARE

## 2023-09-18 DIAGNOSIS — F41.1 GENERALIZED ANXIETY DISORDER: Primary | ICD-10-CM

## 2023-09-18 DIAGNOSIS — F41.0 PANIC DISORDER: ICD-10-CM

## 2023-09-18 DIAGNOSIS — F33.42 MDD (MAJOR DEPRESSIVE DISORDER), RECURRENT, IN FULL REMISSION (HCC): ICD-10-CM

## 2023-09-18 PROBLEM — F11.21 OPIOID DEPENDENCE IN REMISSION (HCC): Status: RESOLVED | Noted: 2022-11-07 | Resolved: 2023-09-18

## 2023-09-18 PROCEDURE — 90792 PSYCH DIAG EVAL W/MED SRVCS: CPT | Performed by: STUDENT IN AN ORGANIZED HEALTH CARE EDUCATION/TRAINING PROGRAM

## 2023-09-18 RX ORDER — ALPRAZOLAM 0.5 MG/1
TABLET ORAL
Qty: 60 TABLET | Refills: 0 | Status: SHIPPED | OUTPATIENT
Start: 2023-09-18

## 2023-09-18 RX ORDER — ALPRAZOLAM 0.5 MG/1
0.5 TABLET ORAL 2 TIMES DAILY
Qty: 60 TABLET | Refills: 0 | Status: SHIPPED | OUTPATIENT
Start: 2023-10-16 | End: 2023-11-15

## 2023-09-18 RX ORDER — ALPRAZOLAM 0.5 MG/1
0.5 TABLET ORAL 2 TIMES DAILY
Qty: 60 TABLET | Refills: 0 | Status: SHIPPED | OUTPATIENT
Start: 2023-11-16 | End: 2023-12-16

## 2023-09-18 NOTE — PSYCH
[facility]    Name and Date of Birth: Yuri Staples 75 y.o. 1955 MRN: [id number]    Date of Visit: September 18, 2023    Reason for visit: Full psychiatric intake assessment for medication management     HPI     Yuri Staples is a 75 y.o. female with a past psychiatric history significant for  who presents to the [address] [place] [address] outpatient clinic for intake assessment. Perry Barriga presents as a transfer from her previous provider Arline Bowling who has recently retired. When speaking to the patient, she denies SI, HI, AVH, worsening depression, worsening anxiety, recent history of moustapha, delusions or any acute mental health concerns at this time. she states that she has been at [facility] for at least the last decade or so, likely longer. She states that her main complaints when she was first seen centered around her anxiety and panic attacks and she denies a depression was ever much of an issue since her 25s. She states that she has, over her life, trialed over "30" psychotropics including BuSpar, Zoloft and many of the antidepressants. Unfortunately, she cannot recall exactly which ones she had trialed nor exactly her idiosyncratic reaction to each of them. Patient can only state that she had stopped trialing antidepressants because they had "all sent me to the hospital."  Patient describes reactions including tachycardia and vital sign instability such that she had to be admitted to the hospital and be told to discontinue the medications immediately. Patient states that she has been on Xanax 0.5 mg twice a day standing regimen and that this has been the only thing that has ever helped her feel "normal" and resolved her anxiety. Thus, she is resistant to any change in medications whatsoever and prefers to remain on current regimen because it has ensured her stability.   Otherwise, currently she states that she struggles with various financial issues due to her "procrastination" such as taking too long to fill out the proper paperwork that she needs to pay some of her bills. In addition to this, patient states that she also chronically struggles with monetary and transportation concerns. Due to the recent difficulty in paying some of her bills, she states that she is in a more anxious state for the past few weeks however states that this will resolve and that she is confident that she will return to her baseline and that it has not caused her to become so anxious that she feels as if she cannot handle it. Thus, she declined to do the screening questions today stating that her mood for the past few days was atypical and preferred to do it at a subsequent appointment when she was back to her baseline. Patient states that she has not had suicidal ideation or HI for decades when she was in her 25s and had thoughts of committing suicide and taking her children with her bike killing them first.  She states that she never acted on these thoughts and was able to shake them off and has never had any of these concerns since then. She states that she has never had any AVH, janneth, delusions. She remains in touch with her family and friends and though she lives alone, she states that she is performing her ADLs and IADLs at her baseline and does not have any acute mental health issues at this time. Current Rating Scores:     None completed today.     Psychiatric Review Of Systems:    Sleep changes: no  Appetite changes: no  Weight changes: no  Energy/anergy: no  Interest/pleasure/anhedonia: no  Somatic symptoms: no  Anxiety/panic: yes  Janneth: no  Guilty/hopeless: no  Self injurious behavior/risky behavior: None reported, denies any history of cutting ever  Suicidal ideation: no  Homicidal ideation: no  Auditory hallucinations: no  Visual hallucinations: no  Other hallucinations: no  Delusional thinking: no  Eating disorder history: no  Obsessive/compulsive symptoms: no    Review Of Systems:    Constitutional negative   ENT negative   Cardiovascular negative   Respiratory negative   Gastrointestinal negative   Genitourinary negative   Musculoskeletal negative   Integumentary negative   Neurological negative   Endocrine    Other Symptoms none, all other systems are negative, Patient adamantly denies any physical complaints or concerns       Family Psychiatric History:     Family History   Problem Relation Age of Onset   • Lung cancer Mother    • Cirrhosis Father    • Cancer Sister    • Colon polyps Neg Hx    • Colon cancer Neg Hx      Endorses significant history of psychiatric illness and suicide attempts throughout family      Past Psychiatric History:     Inpatient psychiatric admissions: Denies  Prior outpatient psychiatric linkage: [facility]  Past/current psychotherapy: Denies  History of suicidal attempts/gestures: States that the last time she had suicidal thoughts was in her 25s  History of violence/aggressive behaviors: Denies  Psychotropic medication trials: Reportedly has taken 30+ antidepressants and had severe reactions to them  Substance abuse inpatient/outpatient rehabilitation: None reported    Substance Abuse History:    Endorses occasional social alcohol use, have a pack of cigarettes per day. States that she has been clean of opiates/opioids for at least the last few decades. Denies any other drug use whatsoever      Social History:    Developmental: Denies a history of milestone/developmental delay. Denies a history of in-utero exposure to toxins/illicit substances. There is no documented history of IEP or need for special education. Education: Did not finish high school  Marital history:   Living arrangement, social support: lives alone  Occupational History: unemployed  Access to firearms: Denies direct access to weapons/firearms.  Gum[place] Later has no history of arrests or violence with a deadly weapon. Patient smokes about half a pack of cigarettes per day. She denies opiate use for the last few decades. She states that her last drink was at the beginning of summer. Given her history, she voices understanding and agreement to avoid drinking due to her chronic health history and risks of fatal seizures and withdrawal symptoms    Traumatic History:     Abuse:none is reported  Other Traumatic Events: None reported    Past Medical History:    Past Medical History:   Diagnosis Date   • Anemia    • Anxiety    • Ascites    • Cervical cancer (HCC)    • Chronic pain    • Cirrhosis ([address])    • CVA (cerebral vascular accident) ([address])    • Depression    • Fibromyalgia    • Hepatic encephalopathy ([address])    • Opioid dependence in remission ([address]) 11/7/2022        Past Surgical History:   Procedure Laterality Date   • COLONOSCOPY  07/14/2015   • COLONOSCOPY  03/15/2013    Hemorrhoids   • EGD  12/23/2014   • HYSTERECTOMY       Allergies   Allergen Reactions   • Gadolinium    • Nsaids    • Other Palpitations     All anti depressants       History Review: The following portions of the patient's history were reviewed and updated as appropriate: allergies, current medications, past family history, past medical history, past social history, past surgical history and problem list.    OBJECTIVE:    Vital signs in last 24 hours: There were no vitals filed for this visit.     Mental Status Evaluation:    Appearance age appropriate, casually dressed, looks older than stated age   Behavior cooperative, calm   Speech normal rate, normal volume, normal pitch   Mood normal   Affect normal range and intensity, appropriate   Thought Processes organized, goal directed   Associations intact associations   Thought Content no overt delusions   Perceptual Disturbances: no auditory hallucinations, no visual hallucinations   Abnormal Thoughts  Risk Potential Suicidal ideation - None  Homicidal ideation - None  Potential for aggression - No   Orientation oriented to person, place, time/date and situation   Memory recent and remote memory grossly intact, Does have some difficulty recollecting some specifics for long-term memory   Consciousness alert and awake   Attention Span Concentration Span attention span and concentration are age appropriate   Intellect appears to be of average intelligence   Insight intact   Judgement intact   Muscle Strength and  Gait normal muscle strength and normal muscle tone, normal gait and normal balance   Motor Activity no abnormal movements   Language no difficulty naming common objects, no difficulty repeating a phrase   Fund of Knowledge adequate knowledge of current events  adequate fund of knowledge regarding past history  adequate fund of knowledge regarding vocabulary    Pain none   Pain Scale 0       Laboratory Results: I have personally reviewed all pertinent laboratory/tests results    Recent Labs (last 6 months):   No visits with results within 6 Month(s) from this visit.    Latest known visit with results is:   Office Visit on 09/13/2019   Component Date Value   • White Blood Cell Count 10/09/2019 Comment    • Red Blood Cell Count 10/09/2019 CANCELED    • Hemoglobin 10/09/2019 CANCELED    • HCT 10/09/2019 CANCELED    • Platelet Count 99/18/1989 CANCELED    • Neutrophils 10/09/2019 CANCELED    • Lymphocytes 10/09/2019 CANCELED    • Monocytes 10/09/2019 CANCELED    • Eosinophils 10/09/2019 CANCELED    • Lymphocytes (Absolute) 10/09/2019 CANCELED    • Eosinophils (Absolute) 10/09/2019 CANCELED    • Basophils ABS 10/09/2019 CANCELED    • Glucose, Random 10/09/2019 140 (H)    • BUN 10/09/2019 9    • Creatinine 10/09/2019 0.83    • eGFR Non African American 10/09/2019 75    • eGFR  10/09/2019 86    • SL AMB BUN/CREATININE RA* 10/09/2019 11 (L)    • Sodium 10/09/2019 143    • Potassium 10/09/2019 3.7    • Chloride 10/09/2019 103    • CO2 10/09/2019 28    • CALCIUM 10/09/2019 9.8    • Protein, Total 10/09/2019 6.3    • Albumin 10/09/2019 2.9 (L)    • Globulin, Total 10/09/2019 3.4    • Albumin/Globulin Ratio 10/09/2019 0.9 (L)    • TOTAL BILIRUBIN 10/09/2019 2.2 (H)    • Alk Phos Isoenzymes 10/09/2019 98    • AST 10/09/2019 56 (H)    • ALT 10/09/2019 26    • Ammonia, Plasma 10/09/2019 132 (HH)    • INR 10/09/2019 1.4 (H)    • Prothrombin Time 10/09/2019 13.8 (H)    • AFP-Tumor Marker 10/09/2019 7.2        Suicide/Homicide Risk Assessment:    Risk of Harm to Self:  • The following ratings are based on assessment at the time of the interview  • Historical Risk Factors include: history of anxiety  • Protective Factors: no current suicidal ideation, compliant with medications, having a desire to live    Risk of Harm to Others:  • The following ratings are based on assessment at the time of the interview  • Historical Risk Factors include: history of substance use. • Protective Factors: no current homicidal ideation, able to manage anger well, good impulse control    The following interventions are recommended: no intervention changes needed. Although patient's acute lethality risk is LOW, long-term/chronic lethality risk is mildly elevated given historical information. However, at the current moment, Ebony Tamayo is future-oriented, forward-thinking, and demonstrates ability to act in a self-preserving manner as evidenced by volitionally presenting to the clinic today, seeking treatment. Additionally, Ebony Tamayo __ suggesting a will and desire to live. At this juncture, inpatient hospitalization is not currently warranted. To mitigate future risk, patient should adhere to treatment recommendations, avoid alcohol/illicit substance use, utilize community-based resources and familiar support, and prioritize mental health treatment. DSM-V Diagnoses:     1.)  TOO  2.)  Panic disorder  3.)     Assessment/Plan:     Continue Xanax 0.5 mg twice daily.   Suggest that patient get connected to PCP in light of her chronic health issues. Today's Plan/Medical Decision Making:    Psychopharmacologically, I spoke at length with Irasema Marvin about the bio-psycho-social approach to treatment and avenues for intervention. I stressed the importance of making better dietary choices, expanding exercise regimen, and reestablishing a sense of purpose and connectivity in life. Treatment Recommendations/Precautions:        Medication management every 3 months  Aware of 24 hour and weekend coverage for urgent situations accessed by calling [facility] main practice number    Patient voiced understanding and agreement to call 911 or head to the nearest emergency room should they experience any physical or mental health decompensation whatsoever. Medications Risks/Benefits:      Risks, Benefits And Possible Side Effects Of Medications:    Risks, benefits, and possible side effects of medications explained to Irasema Marvin and she verbalizes understanding and agreement for treatment. Controlled Medication Discussion:     Irasema Marvin has been filling controlled prescriptions on time as prescribed according to [place] Prescription Drug Monitoring Program    Treatment Plan:    Completed and signed during the session: Will be completed at next session due to lack of time today      Visit Time    Visit Start Time: 9:40 AM  Visit Stop Time: 10:30 AM  Total Visit Duration: 50 minutes     The total visit duration detailed above includes: patient engagement, medication management, psychotherapy/counseling, discussion regarding treatment goals, documentation, review of past medical records, and coordination of care. Note Share Disclaimer:      This note was not shared with the patient due to reasonable likelihood of causing patient harm    Ana Paula Cadena DO  09/18/23

## 2023-12-04 ENCOUNTER — OFFICE VISIT (OUTPATIENT)
Dept: PSYCHIATRY | Facility: CLINIC | Age: 68
End: 2023-12-04
Payer: MEDICARE

## 2023-12-04 DIAGNOSIS — F41.1 GENERALIZED ANXIETY DISORDER: ICD-10-CM

## 2023-12-04 PROCEDURE — 99213 OFFICE O/P EST LOW 20 MIN: CPT | Performed by: STUDENT IN AN ORGANIZED HEALTH CARE EDUCATION/TRAINING PROGRAM

## 2023-12-04 NOTE — PSYCH
MEDICATION MANAGEMENT NOTE        [facility]      Name and Date of Birth: Valentino Ann 70 y.o. 1955 MRN: [id number]    Date of Visit: December 4, 2023    Reason for Visit: Follow-up visit for medication management       SUBJECTIVE:    Valentino Ann is a 70 y.o. female with past psychiatric history significant for MDD who was personally seen and evaluated today at the [address] [place] [address] outpatient clinic for follow-up and medication management. Sesar Harris states that her depression and anxiety are about the same as our previous appointment. She denies SI, HI, AVH, delusions, moustapha since we last met. She states that she does not like the holiday time and that it is a stressor for her. She states that she started to have left knee pain yesterday but cannot exactly pinpoint why it occurred or exactly where the pain is. Patient was informed that there is the possibility of this being a DVT and at this time the patient declined to go to the hospital or seek treatment and stated that she would rather follow up with a primary care physician within the next week or 2 and voiced understanding and agreement and a crisis plan to call 911 or had to the nearest ED should she experience any physical decompensation whatsoever after discussion of risks, benefits, alternatives. With regards to her current regimen, after discussion of risks, benefits, and side effects, alternatives, patient declines to trial any other psychotropics because she reiterates which she told me on her first encounter that she has tried too many psychotropics account and then most of them sent her to the hospital due to severe side effects and that Xanax is the only thing that has ever worked for her.   She stated that she occasionally does have breakthrough periods of panic attacks and after discussion of risks, benefits, potential side effects, alternatives, 3 extra Xanax doses to be taken as needed for panic attacks per month will be added so that patient does not have to increase her use of her regular Xanax doses and potentially run out early. Otherwise, she denies acute mental health complaints or concerns and voices engagement with her family and social life. Current Rating Scores:     None completed today. Review Of Systems:      Constitutional negative   ENT nasal discharge   Cardiovascular negative   Respiratory negative   Gastrointestinal negative   Genitourinary negative   Musculoskeletal knee pain   Integumentary negative   Neurological neuropathic pain   Endocrine negative   Other Symptoms none, all other systems are negative       Past Psychiatric History: (unchanged information from previous note copied and italicized) - Information that is bolded has been updated. See intake      Substance Abuse History: (unchanged information from previous note copied and italicized) - Information that is bolded has been updated. See intake    Social History: (unchanged information from previous note copied and italicized) - Information that is bolded has been updated. See intake      Traumatic History: (unchanged information from previous note copied and italicized) - Information that is bolded has been updated.      See intake      Past Medical History:    Past Medical History:   Diagnosis Date    Anemia     Anxiety     Ascites     Cervical cancer (HCC)     Chronic pain     Cirrhosis ([address])     CVA (cerebral vascular accident) ([address])     Depression     Fibromyalgia     Hepatic encephalopathy ([address])     Opioid dependence in remission ([address]) 11/7/2022        Past Surgical History:   Procedure Laterality Date    COLONOSCOPY  07/14/2015    COLONOSCOPY  03/15/2013    Hemorrhoids    EGD  12/23/2014    HYSTERECTOMY       Allergies   Allergen Reactions    Gadolinium     Nsaids     Other Palpitations     All anti depressants       Substance Abuse History:    Social History     Substance and Sexual Activity   Alcohol Use Not Currently    Comment: Had been drinking anywhere from 3-5 drinks of hard alcohol daily for the past 2 years, quit 8/17     Social History     Substance and Sexual Activity   Drug Use Not on file       Social History:    Social History     Socioeconomic History    Marital status:      Spouse name: Not on file    Number of children: Not on file    Years of education: Not on file    Highest education level: Not on file   Occupational History    Not on file   Tobacco Use    Smoking status: Every Day    Smokeless tobacco: Never   Vaping Use    Vaping Use: Not on file   Substance and Sexual Activity    Alcohol use: Not Currently     Comment: Had been drinking anywhere from 3-5 drinks of hard alcohol daily for the past 2 years, quit 8/17    Drug use: Not on file    Sexual activity: Not on file   Other Topics Concern    Not on file   Social History Narrative    Not on file     Social Determinants of Health     Financial Resource Strain: Not on file   Food Insecurity: Not on file   Transportation Needs: Not on file   Physical Activity: Not on file   Stress: Not on file   Social Connections: Not on file   Intimate Partner Violence: Not on file   Housing Stability: Not on file       Family Psychiatric History:     Family History   Problem Relation Age of Onset    Lung cancer Mother     Cirrhosis Father     Cancer Sister     Colon polyps Neg Hx     Colon cancer Neg Hx        History Review: The following portions of the patient's history were reviewed and updated as appropriate: allergies, current medications, past family history, past medical history, past social history, past surgical history, and problem list.         OBJECTIVE:     Vital signs in last 24 hours: There were no vitals filed for this visit.     Mental Status Evaluation:    Appearance age appropriate, casually dressed   Behavior cooperative, mildly anxious   Speech normal rate, normal volume, normal pitch   Mood dysphoric   Affect constricted   Thought Processes organized, goal directed   Associations intact associations   Thought Content no overt delusions   Perceptual Disturbances: no auditory hallucinations, no visual hallucinations   Abnormal Thoughts  Risk Potential Suicidal ideation - None  Homicidal ideation - None  Potential for aggression - No   Orientation oriented to person, place, time/date, and situation   Memory recent and remote memory grossly intact   Consciousness alert and awake   Attention Span Concentration Span attention span and concentration are age appropriate   Intellect appears to be of average intelligence   Insight intact   Judgement intact   Muscle Strength and  Gait decreased muscle strength, slow gait   Motor activity no abnormal movements   Language no difficulty naming common objects, no difficulty repeating a phrase   Fund of Knowledge adequate knowledge of current events  adequate fund of knowledge regarding past history   Pain mild   Pain Scale Did not ask patient to formally rate       Laboratory Results: I have personally reviewed all pertinent laboratory/tests results    Recent Labs (last 2 months):   No visits with results within 2 Month(s) from this visit.    Latest known visit with results is:   Office Visit on 09/13/2019   Component Date Value    White Blood Cell Count 10/09/2019 Comment     Red Blood Cell Count 10/09/2019 CANCELED     Hemoglobin 10/09/2019 CANCELED     HCT 10/09/2019 CANCELED     Platelet Count 41/89/5245 CANCELED     Neutrophils 10/09/2019 CANCELED     Lymphocytes 10/09/2019 CANCELED     Monocytes 10/09/2019 CANCELED     Eosinophils 10/09/2019 CANCELED     Lymphocytes (Absolute) 10/09/2019 CANCELED     Eosinophils (Absolute) 10/09/2019 CANCELED     Basophils ABS 10/09/2019 CANCELED     Glucose, Random 10/09/2019 140 (H)     BUN 10/09/2019 9     Creatinine 10/09/2019 0.83     eGFR Non African American 10/09/2019 75     eGFR  10/09/2019 86     SL AMB BUN/CREATININE RA* 10/09/2019 11 (L)     Sodium 10/09/2019 143     Potassium 10/09/2019 3.7     Chloride 10/09/2019 103     CO2 10/09/2019 28     CALCIUM 10/09/2019 9.8     Protein, Total 10/09/2019 6.3     Albumin 10/09/2019 2.9 (L)     Globulin, Total 10/09/2019 3.4     Albumin/Globulin Ratio 10/09/2019 0.9 (L)     TOTAL BILIRUBIN 10/09/2019 2.2 (H)     Alk Phos Isoenzymes 10/09/2019 98     AST 10/09/2019 56 (H)     ALT 10/09/2019 26     Ammonia, Plasma 10/09/2019 132 (HH)     INR 10/09/2019 1.4 (H)     Prothrombin Time 10/09/2019 13.8 (H)     AFP-Tumor Marker 10/09/2019 7.2        Suicide/Homicide Risk Assessment:    Risk of Harm to Self:  The following ratings are based on assessment at the time of the interview  Historical Risk Factors include: chronic psychiatric problems  Protective Factors: no current suicidal ideation, compliant with medications, compliant with mental health treatment, having a desire to be alive, stable living environment    Risk of Harm to Others: The following ratings are based on assessment at the time of the interview  Historical Risk Factors include: none. Protective Factors: no current homicidal ideation    The following interventions are recommended: contracts for safety at present - agrees to go to ED if feeling unsafe, contracts for safety at present - agrees to call Crisis Intervention Service if feeling unsafe      Lethality Statement:    Based on today's assessment and clinical criteria, Sofie Fernandez contracts for safety and is not an imminent risk of harm to self or others. Outpatient level of care is deemed appropriate at this current time. [place] understands that if they can no longer contract for safety, they need to call the office or report to their nearest Emergency Room for immediate evaluation. They voiced understanding and agreement to call 911 or head to the nearest ED should they have any physical or mental decompensation whatsoever.        Assessment/Plan:     1.) MDD  2.)  TOO  3.)    After discussion of risks, benefits, and side effects, alternatives, patient declines to change her medication regimen in any way other than a supplemental supply of 3 extra Xanax per month for moments of breakthrough panic. She denies acute mental complaints or concerns at this time and voices understanding and agreement on a crisis plan and to monitor the status of her knee and physical health and contact emergency services should she experience any physical or mental decompensation whatsoever. Medication management every 1 months  Does not want to see a therapist despite recommendation  Aware of 24 hour and weekend coverage for urgent situations accessed by calling [facility] main practice number    Medications Risks/Benefits      Risks, Benefits And Possible Side Effects Of Medications:    Risks, benefits, and possible side effects of medications explained to Sarah and she verbalizes understanding and agreement for treatment. Controlled Medication Discussion:     Sarah has been filling controlled prescriptions on time as prescribed according to [address] Program    Psychotherapy Provided:     Individual psychotherapy provided: Importance of medication and treatment compliance reviewed with Sarah. Importance of follow up with family physician for medical issues reviewed with Sarah. Crisis/safety plan discussed with Sarah. Treatment Plan:    Completed and signed during the session: We will perform in next appointment due to lack of time today      Visit Time    Visit Start Time: 12:00 PM  Visit Stop Time: 12:20 PM  Total Visit Duration:  20 minutes     The total visit duration detailed above includes: patient engagement, medication management, psychotherapy/counseling, discussion regarding treatment goals, documentation, review of past medical records, and coordination of care. Note Share Disclaimer:      This note was not shared with the patient due to reasonable likelihood of causing patient harm      Manda Finley DO  Psychiatry  12/04/23

## 2023-12-04 NOTE — PSYCH
Client Name: Brandie Cárdenas       Client YOB: 1955  : 1955    Treatment Team:     Psychiatrist: Ana Paula [facility] Street Provider  Ethel Almaraz MD  [address] [facility]  [address] [place] [address]        Plan Type: adolescent/adult (14 and over) Update/Review    My Personal Strengths are (in the client's own words):  "Honest, caring"    The stressors and triggers that may put me at risk are:  Chores to do    Coping skills I can use to keep myself calm and safe:  Listen to music and watch tv    Coping skills/supports I can use to maintain abstinence from substance use:   Not Applicable    The people that provide me with help and support: (Include name, contact, and how they can help)    Support Persons #1:   *Extended Emergency Contact Information  Primary Emergency Contact: Robert Balderrama, [address] [facility] of [address] [address]is Phone: [phone]  Mobile Phone: [phone]  Relation: Child   *How they can help me? "Take to hospital"    Support person #2: - Thad Grey   * Phone number: in cell phone   * How can they help me? "Take to hospital"          If it is an emergency and you need immediate help, call     If there is a possibility of danger to yourself or others, call the following crisis hotline resources:     Adult Crisis Numbers  Suicide Prevention Hotline - Dial   [facility]: [address]: [address]: [address]: [phone]  [address] [place] [address] Street: [phone]  [facility]: [address]: [address]: [phone] (daytime).        [phone] (after hours, weekends, holidays)     Child/Adolescent Crisis Numbers   [facility] \A Chronology of [place] [facility]\"": [address]: [phone]   [facility]: [phone]   [address] [place] [address] Street: [phone]    Please note: Some [facility] do not have a separate number for Child/Adolescent specific crisis. If your county is not listed under Child/Adolescent, please call the adult number for your county     National Talk to Text Line   All Ages - [phone]    In the event your feelings become unmanageable, and you cannot reach your support system, you will call 911 immediately or go to the nearest hospital emergency room. If you have any physical or medical emergency or feel as if you are in physical/medical distress, call 911 immediately or go to the nearest hospital emergency room.

## 2023-12-05 RX ORDER — ALPRAZOLAM 0.5 MG/1
0.5 TABLET ORAL DAILY PRN
Qty: 3 TABLET | Refills: 5 | Status: SHIPPED | OUTPATIENT
Start: 2023-12-05

## 2023-12-05 RX ORDER — ALPRAZOLAM 0.5 MG/1
TABLET ORAL
Qty: 60 TABLET | Refills: 5 | Status: SHIPPED | OUTPATIENT
Start: 2023-12-05

## 2024-01-08 ENCOUNTER — OFFICE VISIT (OUTPATIENT)
Dept: PSYCHIATRY | Facility: CLINIC | Age: 69
End: 2024-01-08
Payer: MEDICARE

## 2024-01-08 DIAGNOSIS — Z00.00 WELLNESS EXAMINATION: Primary | ICD-10-CM

## 2024-01-08 DIAGNOSIS — F41.1 GENERALIZED ANXIETY DISORDER: ICD-10-CM

## 2024-01-08 DIAGNOSIS — F33.1 MAJOR DEPRESSIVE DISORDER, RECURRENT EPISODE, MODERATE (HCC): ICD-10-CM

## 2024-01-08 DIAGNOSIS — F33.42 MDD (MAJOR DEPRESSIVE DISORDER), RECURRENT, IN FULL REMISSION (HCC): ICD-10-CM

## 2024-01-08 DIAGNOSIS — R60.9 EDEMA, UNSPECIFIED TYPE: ICD-10-CM

## 2024-01-08 PROCEDURE — 99213 OFFICE O/P EST LOW 20 MIN: CPT | Performed by: STUDENT IN AN ORGANIZED HEALTH CARE EDUCATION/TRAINING PROGRAM

## 2024-01-10 RX ORDER — ALPRAZOLAM 0.5 MG/1
TABLET ORAL
Qty: 60 TABLET | Refills: 5 | Status: SHIPPED | OUTPATIENT
Start: 2024-01-10

## 2024-01-10 RX ORDER — ALPRAZOLAM 0.5 MG/1
0.5 TABLET ORAL DAILY PRN
Qty: 5 TABLET | Refills: 5 | Status: SHIPPED | OUTPATIENT
Start: 2024-01-10 | End: 2024-02-09

## 2024-01-10 NOTE — PSYCH
MEDICATION MANAGEMENT NOTE        [facility]      Name and Date of Birth:  Finn Oleary 68 y.o. 1955 MRN: [id number]    Date of Visit: January 10, 2024    Reason for Visit: Follow-up visit for medication management       SUBJECTIVE:    Finn Oleary is a 68 y.o. female with past psychiatric history significant for MDD who was personally seen and evaluated today at the [facility] outpatient clinic for follow-up and medication management. Finn states that her depression and anxiety are about the same as our previous appointment.  She denies SI, HI, AVH, delusions, moustapha since we last met.  She states that she felt better during the holidays because she found out that she actually had more money than she thought in order to buy her family gifts.  She denies worsening feelings of depression and anxiety.  After discussion of risks, benefits, and side effects, alternatives, we will continue on current standing dose of Xanax half a milligram twice daily since patient continues to state that she is unwilling to try any other medication whatsoever due to long list of severe adverse side effects she has experienced on multiple different psychotropics in the past and her belief that the current regimen is the only 1 that works for her.  After discussion of risks, benefits, and side effects, alternatives, she will be provided 5 extra doses of Xanax in totality per month for severe breakthrough anxiety with the caveat that no further extra doses will be provided to her.  She denies acute mental complaints or concerns at this time and voices understanding and agreement to our crisis plan.        Current Rating Scores:     None completed today.    Review Of Systems:      Constitutional negative   ENT Denies   Cardiovascular negative   Respiratory negative   Gastrointestinal negative   Genitourinary negative   Musculoskeletal knee pain   Integumentary  negative   Neurological neuropathic pain   Endocrine negative   Other Symptoms none, all other systems are negative       Past Psychiatric History: (unchanged information from previous note copied and italicized) - Information that is bolded has been updated.   See intake      Substance Abuse History: (unchanged information from previous note copied and italicized) - Information that is bolded has been updated.     See intake    Social History: (unchanged information from previous note copied and italicized) - Information that is bolded has been updated.   See intake      Traumatic History: (unchanged information from previous note copied and italicized) - Information that is bolded has been updated.     See intake      Past Medical History:    Past Medical History:   Diagnosis Date    Anemia     Anxiety     Ascites     Cervical cancer (HCC)     Chronic pain     Cirrhosis (HCC)     CVA (cerebral vascular accident) (HCC)     Depression     Fibromyalgia     Hepatic encephalopathy (HCC)     Opioid dependence in remission (HCC) 11/7/2022        Past Surgical History:   Procedure Laterality Date    COLONOSCOPY  07/14/2015    COLONOSCOPY  03/15/2013    Hemorrhoids    EGD  12/23/2014    HYSTERECTOMY       Allergies   Allergen Reactions    Gadolinium     Nsaids     Other Palpitations     All anti depressants       Substance Abuse History:    Social History     Substance and Sexual Activity   Alcohol Use Not Currently    Comment: Had been drinking anywhere from 3-5 drinks of hard alcohol daily for the past 2 years, quit 8/17     Social History     Substance and Sexual Activity   Drug Use Not on file       Social History:    Social History     Socioeconomic History    Marital status:      Spouse name: Not on file    Number of children: Not on file    Years of education: Not on file    Highest education level: Not on file   Occupational History    Not on file   Tobacco Use    Smoking status: Every Day    Smokeless  tobacco: Never   Vaping Use    Vaping status: Not on file   Substance and Sexual Activity    Alcohol use: Not Currently     Comment: Had been drinking anywhere from 3-5 drinks of hard alcohol daily for the past 2 years, quit 8/17    Drug use: Not on file    Sexual activity: Not on file   Other Topics Concern    Not on file   Social History Narrative    Not on file     Social Determinants of Health     Financial Resource Strain: Not on file   Food Insecurity: Not on file   Transportation Needs: Not on file   Physical Activity: Not on file   Stress: Not on file   Social Connections: Not on file   Intimate Partner Violence: Not on file   Housing Stability: Not on file       Family Psychiatric History:     Family History   Problem Relation Age of Onset    Lung cancer Mother     Cirrhosis Father     Cancer Sister     Colon polyps Neg Hx     Colon cancer Neg Hx        History Review: The following portions of the patient's history were reviewed and updated as appropriate: allergies, current medications, past family history, past medical history, past social history, past surgical history, and problem list.         OBJECTIVE:     Vital signs in last 24 hours:    There were no vitals filed for this visit.    Mental Status Evaluation:    Appearance age appropriate, casually dressed   Behavior cooperative, mildly anxious   Speech normal rate, normal volume, normal pitch   Mood dysphoric   Affect constricted   Thought Processes organized, goal directed   Associations intact associations   Thought Content no overt delusions   Perceptual Disturbances: no auditory hallucinations, no visual hallucinations   Abnormal Thoughts  Risk Potential Suicidal ideation - None  Homicidal ideation - None  Potential for aggression - No   Orientation oriented to person, place, time/date, and situation   Memory recent and remote memory grossly intact   Consciousness alert and awake   Attention Span Concentration Span attention span and  concentration are age appropriate   Intellect appears to be of average intelligence   Insight intact   Judgement intact   Muscle Strength and  Gait decreased muscle strength, slow gait   Motor activity no abnormal movements   Language no difficulty naming common objects, no difficulty repeating a phrase   Fund of Knowledge adequate knowledge of current events  adequate fund of knowledge regarding past history   Pain mild   Pain Scale Did not ask patient to formally rate       Laboratory Results: I have personally reviewed all pertinent laboratory/tests results    Recent Labs (last 2 months):   No visits with results within 2 Month(s) from this visit.   Latest known visit with results is:   Office Visit on 09/13/2019   Component Date Value    White Blood Cell Count 10/09/2019 Comment     Red Blood Cell Count 10/09/2019 CANCELED     Hemoglobin 10/09/2019 CANCELED     HCT 10/09/2019 CANCELED     Platelet Count 10/09/2019 CANCELED     Neutrophils 10/09/2019 CANCELED     Lymphocytes 10/09/2019 CANCELED     Monocytes 10/09/2019 CANCELED     Eosinophils 10/09/2019 CANCELED     Lymphocytes (Absolute) 10/09/2019 CANCELED     Eosinophils (Absolute) 10/09/2019 CANCELED     Basophils ABS 10/09/2019 CANCELED     Glucose, Random 10/09/2019 140 (H)     BUN 10/09/2019 9     Creatinine 10/09/2019 0.83     eGFR Non African American 10/09/2019 75     eGFR  10/09/2019 86     SL AMB BUN/CREATININE RA* 10/09/2019 11 (L)     Sodium 10/09/2019 143     Potassium 10/09/2019 3.7     Chloride 10/09/2019 103     CO2 10/09/2019 28     CALCIUM 10/09/2019 9.8     Protein, Total 10/09/2019 6.3     Albumin 10/09/2019 2.9 (L)     Globulin, Total 10/09/2019 3.4     Albumin/Globulin Ratio 10/09/2019 0.9 (L)     TOTAL BILIRUBIN 10/09/2019 2.2 (H)     Alk Phos Isoenzymes 10/09/2019 98     AST 10/09/2019 56 (H)     ALT 10/09/2019 26     Ammonia, Plasma 10/09/2019 132 (HH)     INR 10/09/2019 1.4 (H)     Prothrombin Time 10/09/2019 13.8 (H)      AFP-Tumor Marker 10/09/2019 7.2        Suicide/Homicide Risk Assessment:    Risk of Harm to Self:  The following ratings are based on assessment at the time of the interview  Historical Risk Factors include: chronic psychiatric problems  Protective Factors: no current suicidal ideation, compliant with medications, compliant with mental health treatment, having a desire to be alive, stable living environment    Risk of Harm to Others:  The following ratings are based on assessment at the time of the interview  Historical Risk Factors include: none.  Protective Factors: no current homicidal ideation    The following interventions are recommended: contracts for safety at present - agrees to go to ED if feeling unsafe, contracts for safety at present - agrees to call Crisis Intervention Service if feeling unsafe      Lethality Statement:    Based on today's assessment and clinical criteria, Finn Oleary contracts for safety and is not an imminent risk of harm to self or others. Outpatient level of care is deemed appropriate at this current time. Finn understands that if they can no longer contract for safety, they need to call the office or report to their nearest Emergency Room for immediate evaluation. They voiced understanding and agreement to call 911 or head to the nearest ED should they have any physical or mental decompensation whatsoever.       Assessment/Plan:     1.)  MDD  2.)  TOO  3.)    After discussion of risks, benefits, and side effects, alternatives, patient declines to change her medication regimen in any way other than a supplemental supply of 5 extra Xanax per month for moments of breakthrough panic.  She denies acute mental complaints or concerns at this time and voices understanding and agreement to our crisis plan.  She states that she will call [facility]'s central [facility] to try and find a family care physician and cardiology specialist to continue following up.        Medication  management every 1 months  Does not want to see a therapist despite recommendation  Aware of 24 hour and weekend coverage for urgent situations accessed by calling [facility] main practice number    Medications Risks/Benefits      Risks, Benefits And Possible Side Effects Of Medications:    Risks, benefits, and possible side effects of medications explained to Finn and she verbalizes understanding and agreement for treatment.    Controlled Medication Discussion:     Finn has been filling controlled prescriptions on time as prescribed according to [place] Prescription Drug Monitoring Program    Psychotherapy Provided:     Individual psychotherapy provided: Importance of medication and treatment compliance reviewed with Finn.  Importance of follow up with family physician for medical issues reviewed with Finn.  Crisis/safety plan discussed with Finn.     Treatment Plan:    Completed and signed during the session:  We will perform in next appointment due to lack of time today      Visit Time    Visit Start Time: 11:30 AM  Visit Stop Time: 11:50 AM  Total Visit Duration:  20 minutes     The total visit duration detailed above includes: patient engagement, medication management, psychotherapy/counseling, discussion regarding treatment goals, documentation, review of past medical records, and coordination of care.      Note Share Disclaimer:     This note was not shared with the patient due to reasonable likelihood of causing patient harm      Ana Paula Cadena DO  Psychiatry  01/10/24

## 2024-02-21 PROBLEM — Z12.11 SCREENING FOR COLON CANCER: Status: RESOLVED | Noted: 2019-08-16 | Resolved: 2024-02-21

## 2024-04-30 ENCOUNTER — OFFICE VISIT (OUTPATIENT)
Dept: PSYCHIATRY | Facility: CLINIC | Age: 69
End: 2024-04-30
Payer: MEDICARE

## 2024-04-30 DIAGNOSIS — F41.1 GENERALIZED ANXIETY DISORDER: ICD-10-CM

## 2024-04-30 PROCEDURE — 99214 OFFICE O/P EST MOD 30 MIN: CPT | Performed by: STUDENT IN AN ORGANIZED HEALTH CARE EDUCATION/TRAINING PROGRAM

## 2024-04-30 RX ORDER — ALPRAZOLAM 0.5 MG/1
TABLET ORAL
Qty: 66 TABLET | Refills: 5 | Status: SHIPPED | OUTPATIENT
Start: 2024-04-30

## 2024-05-02 NOTE — PSYCH
MEDICATION MANAGEMENT NOTE        [facility]      Name and Date of Birth:  Finn Oleary 68 y.o. 1955 MRN: [id number]    Date of Visit: May 2, 2024    Reason for Visit: Follow-up visit for medication management       SUBJECTIVE:    Fnin Oleary is a 68 y.o. female with past psychiatric history significant for MDD who was personally seen and evaluated today at the [facility] outpatient clinic for follow-up and medication management. Finn states that her depression and anxiety are about the same as our previous appointment.  She denies SI, HI, AVH, delusions, moustapha since we last met.  She does endorse mildly improved mood after being in touch with family and friends more often.  She admits to not following up with her primary care physician as she was directed to and was again offered the [facility] central scheduling phone number to help find clinics in her network.  After discussion of risks and benefits, and side effects, alternatives, she would like to remain on current regimen as is without any changes whatsoever and denies acute mental complaints or concerns.        Current Rating Scores:     None completed today.    Review Of Systems:      Constitutional negative   ENT Denies   Cardiovascular negative   Respiratory negative   Gastrointestinal negative   Genitourinary negative   Musculoskeletal knee pain   Integumentary negative   Neurological neuropathic pain   Endocrine negative   Other Symptoms none, all other systems are negative       Past Psychiatric History: (unchanged information from previous note copied and italicized) - Information that is bolded has been updated.   See intake      Substance Abuse History: (unchanged information from previous note copied and italicized) - Information that is bolded has been updated.     See intake    Social History: (unchanged information from previous note copied and italicized) -  Information that is bolded has been updated.   See intake      Traumatic History: (unchanged information from previous note copied and italicized) - Information that is bolded has been updated.     See intake      Past Medical History:    Past Medical History:   Diagnosis Date    Anemia     Anxiety     Ascites     Cervical cancer (HCC)     Chronic pain     Cirrhosis (HCC)     CVA (cerebral vascular accident) (HCC)     Depression     Fibromyalgia     Hepatic encephalopathy (HCC)     Opioid dependence in remission (HCC) 11/7/2022        Past Surgical History:   Procedure Laterality Date    COLONOSCOPY  07/14/2015    COLONOSCOPY  03/15/2013    Hemorrhoids    EGD  12/23/2014    HYSTERECTOMY       Allergies   Allergen Reactions    Gadolinium     Nsaids     Other Palpitations     All anti depressants       Substance Abuse History:    Social History     Substance and Sexual Activity   Alcohol Use Not Currently    Comment: Had been drinking anywhere from 3-5 drinks of hard alcohol daily for the past 2 years, quit 8/17     Social History     Substance and Sexual Activity   Drug Use Not on file       Social History:    Social History     Socioeconomic History    Marital status:      Spouse name: Not on file    Number of children: Not on file    Years of education: Not on file    Highest education level: Not on file   Occupational History    Not on file   Tobacco Use    Smoking status: Every Day    Smokeless tobacco: Never   Vaping Use    Vaping status: Not on file   Substance and Sexual Activity    Alcohol use: Not Currently     Comment: Had been drinking anywhere from 3-5 drinks of hard alcohol daily for the past 2 years, quit 8/17    Drug use: Not on file    Sexual activity: Not on file   Other Topics Concern    Not on file   Social History Narrative    Not on file     Social Determinants of Health     Financial Resource Strain: Not on file   Food Insecurity: Not on file   Transportation Needs: Not on file    Physical Activity: Not on file   Stress: Not on file   Social Connections: Not on file   Intimate Partner Violence: Not on file   Housing Stability: Not on file       Family Psychiatric History:     Family History   Problem Relation Age of Onset    Lung cancer Mother     Cirrhosis Father     Cancer Sister     Colon polyps Neg Hx     Colon cancer Neg Hx        History Review: The following portions of the patient's history were reviewed and updated as appropriate: allergies, current medications, past family history, past medical history, past social history, past surgical history, and problem list.         OBJECTIVE:     Vital signs in last 24 hours:    There were no vitals filed for this visit.    Mental Status Evaluation:    Appearance age appropriate, casually dressed   Behavior cooperative, mildly anxious   Speech normal rate, normal volume, normal pitch   Mood dysphoric   Affect constricted   Thought Processes organized, goal directed   Associations intact associations   Thought Content no overt delusions   Perceptual Disturbances: no auditory hallucinations, no visual hallucinations   Abnormal Thoughts  Risk Potential Suicidal ideation - None  Homicidal ideation - None  Potential for aggression - No   Orientation oriented to person, place, time/date, and situation   Memory recent and remote memory grossly intact   Consciousness alert and awake   Attention Span Concentration Span attention span and concentration are age appropriate   Intellect appears to be of average intelligence   Insight intact   Judgement intact   Muscle Strength and  Gait decreased muscle strength, slow gait   Motor activity no abnormal movements   Language no difficulty naming common objects, no difficulty repeating a phrase   Fund of Knowledge adequate knowledge of current events  adequate fund of knowledge regarding past history   Pain mild   Pain Scale Did not ask patient to formally rate       Laboratory Results: I have personally  reviewed all pertinent laboratory/tests results    Recent Labs (last 2 months):   No visits with results within 2 Month(s) from this visit.   Latest known visit with results is:   Office Visit on 09/13/2019   Component Date Value    White Blood Cell Count 10/09/2019 Comment     Red Blood Cell Count 10/09/2019 CANCELED     Hemoglobin 10/09/2019 CANCELED     HCT 10/09/2019 CANCELED     Platelet Count 10/09/2019 CANCELED     Neutrophils 10/09/2019 CANCELED     Lymphocytes 10/09/2019 CANCELED     Monocytes 10/09/2019 CANCELED     Eosinophils 10/09/2019 CANCELED     Lymphocytes (Absolute) 10/09/2019 CANCELED     Eosinophils (Absolute) 10/09/2019 CANCELED     Basophils ABS 10/09/2019 CANCELED     Glucose, Random 10/09/2019 140 (H)     BUN 10/09/2019 9     Creatinine 10/09/2019 0.83     eGFR Non African American 10/09/2019 75     eGFR  10/09/2019 86     SL AMB BUN/CREATININE RA* 10/09/2019 11 (L)     Sodium 10/09/2019 143     Potassium 10/09/2019 3.7     Chloride 10/09/2019 103     CO2 10/09/2019 28     CALCIUM 10/09/2019 9.8     Protein, Total 10/09/2019 6.3     Albumin 10/09/2019 2.9 (L)     Globulin, Total 10/09/2019 3.4     Albumin/Globulin Ratio 10/09/2019 0.9 (L)     TOTAL BILIRUBIN 10/09/2019 2.2 (H)     Alk Phos Isoenzymes 10/09/2019 98     AST 10/09/2019 56 (H)     ALT 10/09/2019 26     Ammonia, Plasma 10/09/2019 132 (HH)     INR 10/09/2019 1.4 (H)     Prothrombin Time 10/09/2019 13.8 (H)     AFP-Tumor Marker 10/09/2019 7.2        Suicide/Homicide Risk Assessment:    Risk of Harm to Self:  The following ratings are based on assessment at the time of the interview  Historical Risk Factors include: chronic psychiatric problems  Protective Factors: no current suicidal ideation, compliant with medications, compliant with mental health treatment, having a desire to be alive, stable living environment    Risk of Harm to Others:  The following ratings are based on assessment at the time of the  interview  Historical Risk Factors include: none.  Protective Factors: no current homicidal ideation    The following interventions are recommended: contracts for safety at present - agrees to go to ED if feeling unsafe, contracts for safety at present - agrees to call Crisis Intervention Service if feeling unsafe      Lethality Statement:    Based on today's assessment and clinical criteria, Finn Oleary contracts for safety and is not an imminent risk of harm to self or others. Outpatient level of care is deemed appropriate at this current time. Finn understands that if they can no longer contract for safety, they need to call the office or report to their nearest Emergency Room for immediate evaluation. They voiced understanding and agreement to call 911 or head to the nearest ED should they have any physical or mental decompensation whatsoever.       Assessment/Plan:     1.)  MDD  2.)  TOO  3.)    After discussion of risks, benefits, and side effects, alternatives, patient declines to change her medication regimen in any way other than a supplemental supply of 6 extra Xanax per month for moments of breakthrough panic.  She denies acute mental complaints or concerns at this time and voices understanding and agreement to our crisis plan.  She states that she will call [facility] central scheduling to try and find a family care physician and cardiology specialist to continue following up.            Medications Risks/Benefits      Risks, Benefits And Possible Side Effects Of Medications:    Risks, benefits, and possible side effects of medications explained to Finn and she verbalizes understanding and agreement for treatment.    Controlled Medication Discussion:     Finn has been filling controlled prescriptions on time as prescribed according to [place] Prescription Drug Monitoring Program    Psychotherapy Provided:     Individual psychotherapy provided: Importance of medication and treatment compliance  reviewed with Finn.  Importance of follow up with family physician for medical issues reviewed with Finn.  Crisis/safety plan discussed with Finn.     Treatment Plan:    Completed and signed during the session:  We will perform in next appointment due to lack of time today      Visit Time    Visit Start Time: 2:00 PM  Visit Stop Time: 2:20 PM  Total Visit Duration:  20 minutes     The total visit duration detailed above includes: patient engagement, medication management, psychotherapy/counseling, discussion regarding treatment goals, documentation, review of past medical records, and coordination of care.      Note Share Disclaimer:     This note was not shared with the patient due to reasonable likelihood of causing patient harm      Ana Paula Cadena DO  Psychiatry  05/02/24

## 2024-06-05 ENCOUNTER — APPOINTMENT (EMERGENCY)
Dept: CT IMAGING | Facility: HOSPITAL | Age: 69
DRG: 503 | End: 2024-06-05
Payer: MEDICARE

## 2024-06-05 ENCOUNTER — HOSPITAL ENCOUNTER (INPATIENT)
Facility: HOSPITAL | Age: 69
LOS: 4 days | Discharge: NURSING, REHAB OR LONG-TERM CARE | DRG: 503 | End: 2024-06-10
Attending: EMERGENCY MEDICINE | Admitting: INTERNAL MEDICINE
Payer: MEDICARE

## 2024-06-05 ENCOUNTER — APPOINTMENT (EMERGENCY)
Dept: RADIOLOGY | Facility: HOSPITAL | Age: 69
DRG: 503 | End: 2024-06-05
Payer: MEDICARE

## 2024-06-05 DIAGNOSIS — K76.82 HEPATIC ENCEPHALOPATHY (HCC): ICD-10-CM

## 2024-06-05 DIAGNOSIS — F10.929 ALCOHOL INTOXICATION (HCC): ICD-10-CM

## 2024-06-05 DIAGNOSIS — M86.9 OSTEOMYELITIS OF GREAT TOE OF RIGHT FOOT (HCC): ICD-10-CM

## 2024-06-05 DIAGNOSIS — M86.9 OSTEOMYELITIS OF TOE (HCC): ICD-10-CM

## 2024-06-05 DIAGNOSIS — R41.82 ALTERED MENTAL STATUS: Primary | ICD-10-CM

## 2024-06-05 DIAGNOSIS — L03.90 CELLULITIS: ICD-10-CM

## 2024-06-05 LAB
ALBUMIN SERPL BCP-MCNC: 3.5 G/DL (ref 3.5–5)
ALP SERPL-CCNC: 95 U/L (ref 34–104)
ALT SERPL W P-5'-P-CCNC: 12 U/L (ref 7–52)
AMMONIA PLAS-SCNC: 80 UMOL/L (ref 18–72)
ANION GAP SERPL CALCULATED.3IONS-SCNC: 6 MMOL/L (ref 4–13)
APAP SERPL-MCNC: <2 UG/ML (ref 10–20)
APTT PPP: 35 SECONDS (ref 23–37)
AST SERPL W P-5'-P-CCNC: 35 U/L (ref 13–39)
BASOPHILS # BLD AUTO: 0.05 THOUSANDS/ÂΜL (ref 0–0.1)
BASOPHILS NFR BLD AUTO: 1 % (ref 0–1)
BILIRUB SERPL-MCNC: 1.65 MG/DL (ref 0.2–1)
BILIRUB UR QL STRIP: NEGATIVE
BNP SERPL-MCNC: 454 PG/ML (ref 0–100)
BUN SERPL-MCNC: 6 MG/DL (ref 5–25)
CALCIUM SERPL-MCNC: 9.7 MG/DL (ref 8.4–10.2)
CARDIAC TROPONIN I PNL SERPL HS: 4 NG/L
CHLORIDE SERPL-SCNC: 96 MMOL/L (ref 96–108)
CK SERPL-CCNC: 79 U/L (ref 26–192)
CLARITY UR: CLEAR
CO2 SERPL-SCNC: 34 MMOL/L (ref 21–32)
COLOR UR: YELLOW
CREAT SERPL-MCNC: 0.67 MG/DL (ref 0.6–1.3)
CRP SERPL QL: 4.7 MG/L
EOSINOPHIL # BLD AUTO: 0.15 THOUSAND/ÂΜL (ref 0–0.61)
EOSINOPHIL NFR BLD AUTO: 3 % (ref 0–6)
ERYTHROCYTE [DISTWIDTH] IN BLOOD BY AUTOMATED COUNT: 12.4 % (ref 11.6–15.1)
ERYTHROCYTE [SEDIMENTATION RATE] IN BLOOD: 2 MM/HOUR (ref 0–29)
ETHANOL SERPL-MCNC: 130 MG/DL
GFR SERPL CREATININE-BSD FRML MDRD: 89 ML/MIN/1.73SQ M
GLUCOSE SERPL-MCNC: 81 MG/DL (ref 65–140)
GLUCOSE UR STRIP-MCNC: NEGATIVE MG/DL
HCT VFR BLD AUTO: 39.6 % (ref 34.8–46.1)
HGB BLD-MCNC: 13.3 G/DL (ref 11.5–15.4)
HGB UR QL STRIP.AUTO: NEGATIVE
IMM GRANULOCYTES # BLD AUTO: 0.01 THOUSAND/UL (ref 0–0.2)
IMM GRANULOCYTES NFR BLD AUTO: 0 % (ref 0–2)
INR PPP: 1.21 (ref 0.84–1.19)
KETONES UR STRIP-MCNC: NEGATIVE MG/DL
LACTATE SERPL-SCNC: 1.7 MMOL/L (ref 0.5–2)
LEUKOCYTE ESTERASE UR QL STRIP: NEGATIVE
LYMPHOCYTES # BLD AUTO: 2.74 THOUSANDS/ÂΜL (ref 0.6–4.47)
LYMPHOCYTES NFR BLD AUTO: 58 % (ref 14–44)
MCH RBC QN AUTO: 33.4 PG (ref 26.8–34.3)
MCHC RBC AUTO-ENTMCNC: 33.6 G/DL (ref 31.4–37.4)
MCV RBC AUTO: 100 FL (ref 82–98)
MONOCYTES # BLD AUTO: 0.41 THOUSAND/ÂΜL (ref 0.17–1.22)
MONOCYTES NFR BLD AUTO: 9 % (ref 4–12)
NEUTROPHILS # BLD AUTO: 1.38 THOUSANDS/ÂΜL (ref 1.85–7.62)
NEUTS SEG NFR BLD AUTO: 29 % (ref 43–75)
NITRITE UR QL STRIP: NEGATIVE
NRBC BLD AUTO-RTO: 0 /100 WBCS
PH UR STRIP.AUTO: 7 [PH]
PLATELET # BLD AUTO: 119 THOUSANDS/UL (ref 149–390)
PMV BLD AUTO: 10.4 FL (ref 8.9–12.7)
POTASSIUM SERPL-SCNC: 3.9 MMOL/L (ref 3.5–5.3)
PROCALCITONIN SERPL-MCNC: <0.05 NG/ML
PROT SERPL-MCNC: 6.5 G/DL (ref 6.4–8.4)
PROT UR STRIP-MCNC: NEGATIVE MG/DL
PROTHROMBIN TIME: 15.8 SECONDS (ref 11.6–14.5)
RBC # BLD AUTO: 3.98 MILLION/UL (ref 3.81–5.12)
SALICYLATES SERPL-MCNC: <5 MG/DL (ref 3–20)
SODIUM SERPL-SCNC: 136 MMOL/L (ref 135–147)
SP GR UR STRIP.AUTO: <1.005 (ref 1–1.03)
TSH SERPL DL<=0.05 MIU/L-ACNC: 5.78 UIU/ML (ref 0.45–4.5)
UROBILINOGEN UR STRIP-ACNC: <2 MG/DL
WBC # BLD AUTO: 4.74 THOUSAND/UL (ref 4.31–10.16)

## 2024-06-05 PROCEDURE — 85652 RBC SED RATE AUTOMATED: CPT | Performed by: EMERGENCY MEDICINE

## 2024-06-05 PROCEDURE — 99285 EMERGENCY DEPT VISIT HI MDM: CPT

## 2024-06-05 PROCEDURE — 84145 PROCALCITONIN (PCT): CPT | Performed by: EMERGENCY MEDICINE

## 2024-06-05 PROCEDURE — 80179 DRUG ASSAY SALICYLATE: CPT | Performed by: EMERGENCY MEDICINE

## 2024-06-05 PROCEDURE — 70450 CT HEAD/BRAIN W/O DYE: CPT

## 2024-06-05 PROCEDURE — 80053 COMPREHEN METABOLIC PANEL: CPT | Performed by: EMERGENCY MEDICINE

## 2024-06-05 PROCEDURE — 82077 ASSAY SPEC XCP UR&BREATH IA: CPT | Performed by: EMERGENCY MEDICINE

## 2024-06-05 PROCEDURE — 71045 X-RAY EXAM CHEST 1 VIEW: CPT

## 2024-06-05 PROCEDURE — 84439 ASSAY OF FREE THYROXINE: CPT | Performed by: EMERGENCY MEDICINE

## 2024-06-05 PROCEDURE — 85730 THROMBOPLASTIN TIME PARTIAL: CPT | Performed by: EMERGENCY MEDICINE

## 2024-06-05 PROCEDURE — 86140 C-REACTIVE PROTEIN: CPT | Performed by: EMERGENCY MEDICINE

## 2024-06-05 PROCEDURE — 83880 ASSAY OF NATRIURETIC PEPTIDE: CPT | Performed by: EMERGENCY MEDICINE

## 2024-06-05 PROCEDURE — 87040 BLOOD CULTURE FOR BACTERIA: CPT | Performed by: EMERGENCY MEDICINE

## 2024-06-05 PROCEDURE — 36415 COLL VENOUS BLD VENIPUNCTURE: CPT | Performed by: EMERGENCY MEDICINE

## 2024-06-05 PROCEDURE — 87154 CUL TYP ID BLD PTHGN 6+ TRGT: CPT | Performed by: EMERGENCY MEDICINE

## 2024-06-05 PROCEDURE — 84484 ASSAY OF TROPONIN QUANT: CPT | Performed by: EMERGENCY MEDICINE

## 2024-06-05 PROCEDURE — 85025 COMPLETE CBC W/AUTO DIFF WBC: CPT | Performed by: EMERGENCY MEDICINE

## 2024-06-05 PROCEDURE — 85610 PROTHROMBIN TIME: CPT | Performed by: EMERGENCY MEDICINE

## 2024-06-05 PROCEDURE — 81003 URINALYSIS AUTO W/O SCOPE: CPT | Performed by: EMERGENCY MEDICINE

## 2024-06-05 PROCEDURE — 93005 ELECTROCARDIOGRAM TRACING: CPT

## 2024-06-05 PROCEDURE — 73630 X-RAY EXAM OF FOOT: CPT

## 2024-06-05 PROCEDURE — 82550 ASSAY OF CK (CPK): CPT | Performed by: EMERGENCY MEDICINE

## 2024-06-05 PROCEDURE — 84443 ASSAY THYROID STIM HORMONE: CPT | Performed by: EMERGENCY MEDICINE

## 2024-06-05 PROCEDURE — 80143 DRUG ASSAY ACETAMINOPHEN: CPT | Performed by: EMERGENCY MEDICINE

## 2024-06-05 PROCEDURE — 83605 ASSAY OF LACTIC ACID: CPT | Performed by: EMERGENCY MEDICINE

## 2024-06-05 PROCEDURE — 99285 EMERGENCY DEPT VISIT HI MDM: CPT | Performed by: EMERGENCY MEDICINE

## 2024-06-05 PROCEDURE — 82140 ASSAY OF AMMONIA: CPT | Performed by: EMERGENCY MEDICINE

## 2024-06-06 ENCOUNTER — APPOINTMENT (INPATIENT)
Dept: MRI IMAGING | Facility: HOSPITAL | Age: 69
DRG: 503 | End: 2024-06-06
Payer: MEDICARE

## 2024-06-06 PROBLEM — D69.6 THROMBOCYTOPENIA (HCC): Status: ACTIVE | Noted: 2024-06-06

## 2024-06-06 PROBLEM — R79.89 ELEVATED TSH: Status: ACTIVE | Noted: 2024-06-06

## 2024-06-06 PROBLEM — G93.41 METABOLIC ENCEPHALOPATHY: Status: ACTIVE | Noted: 2024-06-06

## 2024-06-06 PROBLEM — F10.10 ALCOHOL ABUSE: Status: ACTIVE | Noted: 2024-06-06

## 2024-06-06 PROBLEM — M86.9 OSTEOMYELITIS OF GREAT TOE OF RIGHT FOOT (HCC): Status: ACTIVE | Noted: 2024-06-06

## 2024-06-06 LAB
2HR DELTA HS TROPONIN: 0 NG/L
ANION GAP SERPL CALCULATED.3IONS-SCNC: 3 MMOL/L (ref 4–13)
APTT PPP: 33 SECONDS (ref 23–37)
BUN SERPL-MCNC: 5 MG/DL (ref 5–25)
CALCIUM SERPL-MCNC: 9.2 MG/DL (ref 8.4–10.2)
CARDIAC TROPONIN I PNL SERPL HS: 4 NG/L
CHLORIDE SERPL-SCNC: 102 MMOL/L (ref 96–108)
CO2 SERPL-SCNC: 35 MMOL/L (ref 21–32)
CREAT SERPL-MCNC: 0.6 MG/DL (ref 0.6–1.3)
ERYTHROCYTE [DISTWIDTH] IN BLOOD BY AUTOMATED COUNT: 12.4 % (ref 11.6–15.1)
GFR SERPL CREATININE-BSD FRML MDRD: 93 ML/MIN/1.73SQ M
GLUCOSE SERPL-MCNC: 76 MG/DL (ref 65–140)
HCT VFR BLD AUTO: 37.4 % (ref 34.8–46.1)
HGB BLD-MCNC: 12.4 G/DL (ref 11.5–15.4)
INR PPP: 1.36 (ref 0.84–1.19)
MAGNESIUM SERPL-MCNC: 1.3 MG/DL (ref 1.9–2.7)
MCH RBC QN AUTO: 33 PG (ref 26.8–34.3)
MCHC RBC AUTO-ENTMCNC: 33.2 G/DL (ref 31.4–37.4)
MCV RBC AUTO: 100 FL (ref 82–98)
PHOSPHATE SERPL-MCNC: 3 MG/DL (ref 2.3–4.1)
PLATELET # BLD AUTO: 102 THOUSANDS/UL (ref 149–390)
PMV BLD AUTO: 10.3 FL (ref 8.9–12.7)
POTASSIUM SERPL-SCNC: 3.9 MMOL/L (ref 3.5–5.3)
PROTHROMBIN TIME: 17.2 SECONDS (ref 11.6–14.5)
RBC # BLD AUTO: 3.76 MILLION/UL (ref 3.81–5.12)
SODIUM SERPL-SCNC: 140 MMOL/L (ref 135–147)
T4 FREE SERPL-MCNC: 0.96 NG/DL (ref 0.61–1.12)
WBC # BLD AUTO: 3.62 THOUSAND/UL (ref 4.31–10.16)

## 2024-06-06 PROCEDURE — 36415 COLL VENOUS BLD VENIPUNCTURE: CPT | Performed by: EMERGENCY MEDICINE

## 2024-06-06 PROCEDURE — 73718 MRI LOWER EXTREMITY W/O DYE: CPT

## 2024-06-06 PROCEDURE — 11042 DBRDMT SUBQ TIS 1ST 20SQCM/<: CPT | Performed by: PODIATRIST

## 2024-06-06 PROCEDURE — C9113 INJ PANTOPRAZOLE SODIUM, VIA: HCPCS | Performed by: NURSE PRACTITIONER

## 2024-06-06 PROCEDURE — 99222 1ST HOSP IP/OBS MODERATE 55: CPT | Performed by: PODIATRIST

## 2024-06-06 PROCEDURE — 84100 ASSAY OF PHOSPHORUS: CPT | Performed by: PHYSICIAN ASSISTANT

## 2024-06-06 PROCEDURE — 80048 BASIC METABOLIC PNL TOTAL CA: CPT | Performed by: PHYSICIAN ASSISTANT

## 2024-06-06 PROCEDURE — 85610 PROTHROMBIN TIME: CPT | Performed by: PHYSICIAN ASSISTANT

## 2024-06-06 PROCEDURE — 85027 COMPLETE CBC AUTOMATED: CPT | Performed by: PHYSICIAN ASSISTANT

## 2024-06-06 PROCEDURE — 85730 THROMBOPLASTIN TIME PARTIAL: CPT | Performed by: PHYSICIAN ASSISTANT

## 2024-06-06 PROCEDURE — 83735 ASSAY OF MAGNESIUM: CPT | Performed by: PHYSICIAN ASSISTANT

## 2024-06-06 PROCEDURE — 99222 1ST HOSP IP/OBS MODERATE 55: CPT | Performed by: STUDENT IN AN ORGANIZED HEALTH CARE EDUCATION/TRAINING PROGRAM

## 2024-06-06 PROCEDURE — 99223 1ST HOSP IP/OBS HIGH 75: CPT | Performed by: INTERNAL MEDICINE

## 2024-06-06 PROCEDURE — 84484 ASSAY OF TROPONIN QUANT: CPT | Performed by: EMERGENCY MEDICINE

## 2024-06-06 RX ORDER — PANTOPRAZOLE SODIUM 40 MG/10ML
40 INJECTION, POWDER, LYOPHILIZED, FOR SOLUTION INTRAVENOUS EVERY 12 HOURS SCHEDULED
Status: DISCONTINUED | OUTPATIENT
Start: 2024-06-06 | End: 2024-06-10

## 2024-06-06 RX ORDER — FLUTICASONE FUROATE AND VILANTEROL 100; 25 UG/1; UG/1
1 POWDER RESPIRATORY (INHALATION) DAILY
Status: DISCONTINUED | OUTPATIENT
Start: 2024-06-06 | End: 2024-06-08

## 2024-06-06 RX ORDER — ACETAMINOPHEN 325 MG/1
650 TABLET ORAL EVERY 8 HOURS PRN
Status: DISCONTINUED | OUTPATIENT
Start: 2024-06-06 | End: 2024-06-10

## 2024-06-06 RX ORDER — ALPRAZOLAM 0.5 MG/1
0.5 TABLET ORAL 2 TIMES DAILY
Status: DISCONTINUED | OUTPATIENT
Start: 2024-06-06 | End: 2024-06-10 | Stop reason: HOSPADM

## 2024-06-06 RX ORDER — GABAPENTIN 300 MG/1
300 CAPSULE ORAL
Status: DISCONTINUED | OUTPATIENT
Start: 2024-06-06 | End: 2024-06-10

## 2024-06-06 RX ORDER — ONDANSETRON 2 MG/ML
4 INJECTION INTRAMUSCULAR; INTRAVENOUS EVERY 6 HOURS PRN
Status: DISCONTINUED | OUTPATIENT
Start: 2024-06-06 | End: 2024-06-10 | Stop reason: HOSPADM

## 2024-06-06 RX ORDER — PANTOPRAZOLE SODIUM 40 MG/1
40 TABLET, DELAYED RELEASE ORAL DAILY
Status: DISCONTINUED | OUTPATIENT
Start: 2024-06-06 | End: 2024-06-06

## 2024-06-06 RX ORDER — SPIRONOLACTONE 25 MG/1
50 TABLET ORAL DAILY
Status: DISCONTINUED | OUTPATIENT
Start: 2024-06-06 | End: 2024-06-10 | Stop reason: HOSPADM

## 2024-06-06 RX ORDER — FUROSEMIDE 20 MG/1
20 TABLET ORAL DAILY
Status: DISCONTINUED | OUTPATIENT
Start: 2024-06-06 | End: 2024-06-10 | Stop reason: HOSPADM

## 2024-06-06 RX ORDER — LANOLIN ALCOHOL/MO/W.PET/CERES
100 CREAM (GRAM) TOPICAL DAILY
Status: DISCONTINUED | OUTPATIENT
Start: 2024-06-06 | End: 2024-06-10 | Stop reason: HOSPADM

## 2024-06-06 RX ORDER — ENOXAPARIN SODIUM 100 MG/ML
40 INJECTION SUBCUTANEOUS DAILY
Status: DISCONTINUED | OUTPATIENT
Start: 2024-06-06 | End: 2024-06-06

## 2024-06-06 RX ORDER — CEFTRIAXONE 1 G/50ML
1000 INJECTION, SOLUTION INTRAVENOUS ONCE
Status: COMPLETED | OUTPATIENT
Start: 2024-06-06 | End: 2024-06-06

## 2024-06-06 RX ORDER — CEFTRIAXONE 1 G/50ML
1000 INJECTION, SOLUTION INTRAVENOUS EVERY 24 HOURS
Status: DISCONTINUED | OUTPATIENT
Start: 2024-06-06 | End: 2024-06-10 | Stop reason: HOSPADM

## 2024-06-06 RX ORDER — MAGNESIUM SULFATE HEPTAHYDRATE 40 MG/ML
2 INJECTION, SOLUTION INTRAVENOUS ONCE
Status: COMPLETED | OUTPATIENT
Start: 2024-06-06 | End: 2024-06-06

## 2024-06-06 RX ORDER — LORAZEPAM 2 MG/ML
0.5 INJECTION INTRAMUSCULAR ONCE
Status: COMPLETED | OUTPATIENT
Start: 2024-06-06 | End: 2024-06-06

## 2024-06-06 RX ORDER — ALPRAZOLAM 0.5 MG/1
0.5 TABLET ORAL DAILY PRN
Status: DISCONTINUED | OUTPATIENT
Start: 2024-06-06 | End: 2024-06-10 | Stop reason: HOSPADM

## 2024-06-06 RX ORDER — NICOTINE 21 MG/24HR
1 PATCH, TRANSDERMAL 24 HOURS TRANSDERMAL DAILY
Status: DISCONTINUED | OUTPATIENT
Start: 2024-06-06 | End: 2024-06-10 | Stop reason: HOSPADM

## 2024-06-06 RX ORDER — FOLIC ACID 1 MG/1
1 TABLET ORAL DAILY
Status: DISCONTINUED | OUTPATIENT
Start: 2024-06-06 | End: 2024-06-10 | Stop reason: HOSPADM

## 2024-06-06 RX ORDER — LACTULOSE 10 G/15ML
20 SOLUTION ORAL 3 TIMES DAILY
Status: DISCONTINUED | OUTPATIENT
Start: 2024-06-06 | End: 2024-06-10 | Stop reason: HOSPADM

## 2024-06-06 RX ORDER — VANCOMYCIN HYDROCHLORIDE 750 MG/150ML
12 INJECTION, SOLUTION INTRAVENOUS EVERY 12 HOURS
Status: DISCONTINUED | OUTPATIENT
Start: 2024-06-06 | End: 2024-06-08

## 2024-06-06 RX ORDER — METOPROLOL SUCCINATE 25 MG/1
12.5 TABLET, EXTENDED RELEASE ORAL DAILY
Status: DISCONTINUED | OUTPATIENT
Start: 2024-06-06 | End: 2024-06-10 | Stop reason: HOSPADM

## 2024-06-06 RX ORDER — SODIUM CHLORIDE, SODIUM GLUCONATE, SODIUM ACETATE, POTASSIUM CHLORIDE, MAGNESIUM CHLORIDE, SODIUM PHOSPHATE, DIBASIC, AND POTASSIUM PHOSPHATE .53; .5; .37; .037; .03; .012; .00082 G/100ML; G/100ML; G/100ML; G/100ML; G/100ML; G/100ML; G/100ML
75 INJECTION, SOLUTION INTRAVENOUS CONTINUOUS
Status: DISCONTINUED | OUTPATIENT
Start: 2024-06-06 | End: 2024-06-06

## 2024-06-06 RX ORDER — VANCOMYCIN HYDROCHLORIDE 1 G/200ML
15 INJECTION, SOLUTION INTRAVENOUS ONCE
Status: COMPLETED | OUTPATIENT
Start: 2024-06-06 | End: 2024-06-06

## 2024-06-06 RX ADMIN — ALPRAZOLAM 0.5 MG: 0.5 TABLET ORAL at 20:43

## 2024-06-06 RX ADMIN — FOLIC ACID 1 MG: 1 TABLET ORAL at 09:24

## 2024-06-06 RX ADMIN — CEFTRIAXONE 1000 MG: 1 INJECTION, SOLUTION INTRAVENOUS at 00:46

## 2024-06-06 RX ADMIN — SODIUM CHLORIDE, SODIUM GLUCONATE, SODIUM ACETATE, POTASSIUM CHLORIDE, MAGNESIUM CHLORIDE, SODIUM PHOSPHATE, DIBASIC, AND POTASSIUM PHOSPHATE 75 ML/HR: .53; .5; .37; .037; .03; .012; .00082 INJECTION, SOLUTION INTRAVENOUS at 03:25

## 2024-06-06 RX ADMIN — CEFTRIAXONE 1000 MG: 1 INJECTION, SOLUTION INTRAVENOUS at 20:43

## 2024-06-06 RX ADMIN — GABAPENTIN 300 MG: 300 CAPSULE ORAL at 20:44

## 2024-06-06 RX ADMIN — Medication 100 MG: at 09:25

## 2024-06-06 RX ADMIN — ENOXAPARIN SODIUM 40 MG: 40 INJECTION SUBCUTANEOUS at 12:53

## 2024-06-06 RX ADMIN — LORAZEPAM 0.5 MG: 2 INJECTION INTRAMUSCULAR; INTRAVENOUS at 12:53

## 2024-06-06 RX ADMIN — PANTOPRAZOLE SODIUM 40 MG: 40 TABLET, DELAYED RELEASE ORAL at 08:48

## 2024-06-06 RX ADMIN — PANTOPRAZOLE SODIUM 40 MG: 40 INJECTION, POWDER, FOR SOLUTION INTRAVENOUS at 20:43

## 2024-06-06 RX ADMIN — METOPROLOL SUCCINATE 12.5 MG: 25 TABLET, FILM COATED, EXTENDED RELEASE ORAL at 09:24

## 2024-06-06 RX ADMIN — MAGNESIUM SULFATE HEPTAHYDRATE 2 G: 2 INJECTION, SOLUTION INTRAVENOUS at 09:47

## 2024-06-06 RX ADMIN — ACETAMINOPHEN 650 MG: 325 TABLET, FILM COATED ORAL at 12:00

## 2024-06-06 RX ADMIN — LACTULOSE 20 G: 20 SOLUTION ORAL at 08:49

## 2024-06-06 RX ADMIN — VANCOMYCIN HYDROCHLORIDE 1000 MG: 1 INJECTION, SOLUTION INTRAVENOUS at 02:09

## 2024-06-06 RX ADMIN — LACTULOSE 20 G: 20 SOLUTION ORAL at 15:11

## 2024-06-06 RX ADMIN — ALPRAZOLAM 0.5 MG: 0.5 TABLET ORAL at 23:58

## 2024-06-06 RX ADMIN — VANCOMYCIN HYDROCHLORIDE 750 MG: 750 INJECTION, SOLUTION INTRAVENOUS at 10:18

## 2024-06-06 RX ADMIN — VANCOMYCIN HYDROCHLORIDE 750 MG: 750 INJECTION, SOLUTION INTRAVENOUS at 21:31

## 2024-06-06 NOTE — H&P
Critical access hospital  H&P  Name: Finn Oleary 68 y.o. female I MRN: [id number]  Unit/Bed#: ED 09 I Date of Admission: 6/5/2024   Date of Service: 6/6/2024 I Hospital Day: 0      Assessment & Plan   * Osteomyelitis of great toe of right foot (HCC)  Assessment & Plan  Endorses right great toe pain and swelling for at least the last few days, however she is unclear if true time of onset  Deep ulcer noted to right great toe, concern for osteomyelitis-some questionable cortical disruption on x-ray  CRP 4.7, ESR 2  Continue ceftriaxone and vancomycin for now-avoided cefepime due to concurrent hepatic encephalopathy  MRI right foot ordered  Podiatry consulted  N.p.o. until cleared by podiatry    Metabolic encephalopathy  Assessment & Plan  Slow to respond and some word finding difficulty, though alert and oriented x 4  CTh without acute abnormality  Ammonia level 80, and patient admits to noncompliance with lactulose  Suspect metabolic encephalopathy multifactorial in setting of acute alcohol intoxication, hepatic encephalopathy, as well as acute infection  Will restart lactulose with goal of 3-4 BM daily  If patient fails to improve with lactulose and treatment of infection-consider MRI brain    Alcoholic cirrhosis (HCC)  Assessment & Plan  Longstanding history of alcoholic cirrhosis complicated by ascites, esophageal varices, and hepatic encephalopathy in the past  Decompensated in the setting of hepatic encephalopathy currently, but no evidence of ascites and labs are not overtly abnormal with mildly elevated T. bili at 1.65, no ascites, and only slight elevation of coags with INR at 1.21 and PT at 15.8  GI consulted  Resume lactulose  Admits to noncompliance with Lasix and spironolactone, held on ordering on admit as possibly will need surgical intervention for right great toe also with concern for osteomyelitis    Thrombocytopenia (HCC)  Assessment & Plan  Platelets 119 K on admit  Secondary to  "cirrhosis of the liver  Hold VTE prophylaxis if platelets drop below 15 K or if INR becomes > 2.0    Elevated TSH  Assessment & Plan  TSH elevated at 5.72  Free T4, pending  May need to initiate Synthroid depend upon T4 level    Alcohol abuse  Assessment & Plan  Reports that she only drinks 1 day a week, with last drink just prior to arrival to the ED  Will place on CIWA protocol    Generalized anxiety disorder  Assessment & Plan  Maintained on Xanax 0.5 Mg twice daily with additional as needed dose daily  PDMP reviewed, continue home Xanax dosing    Tobacco use disorder  Assessment & Plan  Admits to drinking half a pack per day  NRT while inpatient           VTE Pharmacologic Prophylaxis: VTE Score: 3 Moderate Risk (Score 3-4) - Pharmacological DVT Prophylaxis Ordered: enoxaparin (Lovenox).  Code Status: Level 1 - Full Code   Discussion with family: Patient declined call to .     Anticipated Length of Stay: Patient will be admitted on an inpatient basis with an anticipated length of stay of greater than 2 midnights secondary to hepatic encephalopathy, osteomyelitis of right great toe.    Chief Complaint: \" My big toe is swollen and hurts\"    History of Present Illness:  Finn Oleary is a 68 y.o. female with a PMH of alcoholic cirrhosis of the liver decompensated with Hx of ascites, varices, and hepatic encephalopathy, anxiety, and tobacco abuse who presents with right great toe swelling and pain.  Patient reports her pain has been painful intermittently over the last few weeks.  She is also noticed possible bruising to her right great toe.  Denies any fevers.  Endorses constipation.  Reports that she has not been compliant with her lactulose.  Denies chest pain or dyspnea.  No other acute illness.  Admits to drinking just prior to the ED.  States she does not drink daily.    Review of Systems:  Review of Systems   Constitutional:  Negative for chills and fever.   HENT:  Negative for congestion.  "   Respiratory:  Negative for cough and shortness of breath.    Cardiovascular:  Negative for chest pain and leg swelling.   Gastrointestinal:  Negative for abdominal pain, constipation, diarrhea, nausea and vomiting.   Genitourinary:  Negative for difficulty urinating, dysuria and hematuria.   Musculoskeletal:         Right great toe pain and swelling   Skin:  Positive for wound.   Neurological:  Negative for weakness and numbness.   All other systems reviewed and are negative.      Past Medical and Surgical History:   Past Medical History:   Diagnosis Date    Anemia     Anxiety     Ascites     Cervical cancer (HCC)     Chronic pain     Cirrhosis (HCC)     CVA (cerebral vascular accident) (HCC)     Depression     Fibromyalgia     Hepatic encephalopathy (HCC)     Opioid dependence in remission (HCC) 11/7/2022       Past Surgical History:   Procedure Laterality Date    COLONOSCOPY  07/14/2015    COLONOSCOPY  03/15/2013    Hemorrhoids    EGD  12/23/2014    HYSTERECTOMY         Meds/Allergies:  Prior to Admission medications    Medication Sig Start Date End Date Taking? Authorizing Provider   ALPRAZolam (XANAX) 0.5 mg tablet take 1 tablet by mouth twice a day for anxiety. 4/30/24   Ana Paula Cadena DO   fluticasone-vilanterol (BREO ELLIPTA) 100-25 mcg/inh inhaler Inhale 1 puff daily Rinse mouth after use.    Historical Provider, MD   folic acid (FOLVITE) 1 mg tablet Take by mouth daily    Historical Provider, MD   furosemide (LASIX) 20 mg tablet Take 20 mg daily alternating with 40 mg daily. 10/7/19   DANISH Alston   gabapentin (NEURONTIN) 300 mg capsule Take 300 mg by mouth daily at bedtime    Historical Provider, MD   Ipratropium-Albuterol (COMBIVENT IN) Inhale 2 (two) times a day    Historical Provider, MD   LACTULOSE PO Take 10 g by mouth daily    Historical Provider, MD   metoprolol succinate (TOPROL-XL) 25 mg 24 hr tablet Take 12.5 mg by mouth daily    Historical Provider, MD   pantoprazole (PROTONIX) 40 mg  tablet Take 1 tablet (40 mg total) by mouth daily 9/13/19   DANISH Alston   spironolactone (ALDACTONE) 50 mg tablet Take 50 mg by mouth 2 (two) times a day    Historical Provider, MD   thiamine (VITAMIN B1) 100 mg tablet Take 100 mg by mouth daily    Historical Provider, MD     I have reviewed home medications with patient personally.    Allergies:   Allergies   Allergen Reactions    Gadolinium     Nsaids     Other Palpitations     All anti depressants       Social History:  Marital Status:    Occupation: Retired  Patient Pre-hospital Living Situation: Home, Alone  Patient Pre-hospital Level of Mobility: walks  Patient Pre-hospital Diet Restrictions: None  Substance Use History:   Social History     Substance and Sexual Activity   Alcohol Use Not Currently    Comment: Had been drinking anywhere from 3-5 drinks of hard alcohol daily for the past 2 years, quit 8/17     Social History     Tobacco Use   Smoking Status Every Day   Smokeless Tobacco Never     Social History     Substance and Sexual Activity   Drug Use Not on file       Family History:  Family History   Problem Relation Age of Onset    Lung cancer Mother     Cirrhosis Father     Cancer Sister     Colon polyps Neg Hx     Colon cancer Neg Hx        Physical Exam:     Vitals:   Blood Pressure: (!) 180/72 (06/06/24 0201)  Pulse: 61 (06/06/24 0201)  Temperature: 98 °F (36.7 °C) (06/05/24 2152)  Temp Source: Temporal (06/05/24 2152)  Respirations: (!) 23 (06/06/24 0201)  Weight - Scale: 62.6 kg (138 lb 0.1 oz) (06/06/24 0050)  SpO2: 99 % (06/06/24 0201)    Physical Exam  Vitals and nursing note reviewed.   Constitutional:       General: She is not in acute distress.     Appearance: Normal appearance. She is not ill-appearing.      Comments: Somnolent but easily awoken.  Once fully awake she was more conversational.   HENT:      Head: Normocephalic.      Mouth/Throat:      Mouth: Mucous membranes are dry.   Eyes:      Conjunctiva/sclera:  Conjunctivae normal.   Cardiovascular:      Rate and Rhythm: Regular rhythm. Bradycardia present.      Pulses: Normal pulses.      Heart sounds: No murmur heard.  Pulmonary:      Effort: Pulmonary effort is normal. No respiratory distress.      Breath sounds: Normal breath sounds. No wheezing, rhonchi or rales.   Abdominal:      General: Abdomen is flat. Bowel sounds are normal.      Palpations: Abdomen is soft.      Tenderness: There is no abdominal tenderness. There is no guarding or rebound.   Musculoskeletal:      Cervical back: Normal range of motion.      Comments: Deep ulcer to right great toe.  Surrounding erythema and swelling.  Feet with poor hygiene bilaterally.  Trace nonpitting edema to bilateral lower extremities.   Skin:     General: Skin is warm and dry.      Coloration: Skin is pale.   Neurological:      Mental Status: She is alert.      Comments: Oriented to self, place, month, year, and president.  Is slow to respond to questions however.  Moves all extremities equally and with purpose   Psychiatric:         Mood and Affect: Mood normal.         Thought Content: Thought content normal.          Additional Data:     Lab Results:  Results from last 7 days   Lab Units 06/05/24  2229   WBC Thousand/uL 4.74   HEMOGLOBIN g/dL 13.3   HEMATOCRIT % 39.6   PLATELETS Thousands/uL 119*   SEGS PCT % 29*   LYMPHO PCT % 58*   MONO PCT % 9   EOS PCT % 3     Results from last 7 days   Lab Units 06/05/24  2229   SODIUM mmol/L 136   POTASSIUM mmol/L 3.9   CHLORIDE mmol/L 96   CO2 mmol/L 34*   BUN mg/dL 6   CREATININE mg/dL 0.67   ANION GAP mmol/L 6   CALCIUM mg/dL 9.7   ALBUMIN g/dL 3.5   TOTAL BILIRUBIN mg/dL 1.65*   ALK PHOS U/L 95   ALT U/L 12   AST U/L 35   GLUCOSE RANDOM mg/dL 81     Results from last 7 days   Lab Units 06/05/24  2229   INR  1.21*             Results from last 7 days   Lab Units 06/05/24  2229   LACTIC ACID mmol/L 1.7   PROCALCITONIN ng/ml <0.05       Lines/Drains:  Invasive Devices        Peripheral Intravenous Line  Duration             Peripheral IV 06/05/24 Left;Ventral (anterior) Forearm <1 day                        Imaging: Reviewed radiology reports from this admission including: CT head and Personally reviewed the following imaging: Right foot x-ray with evidence of cortical disruption of the right great toe distal phalanx  CT head without contrast   Final Result by Hilton Ortiz MD (06/05 3831)      1.  No acute intracranial abnormality.  Chronic microangiopathic changes.   2.  Old right frontoparietal infarct                  Workstation performed: [id number]         XR chest portable - 1 view    (Results Pending)   XR foot 3+ views RIGHT    (Results Pending)   MRI inpatient order    (Results Pending)       EKG and Other Studies Reviewed on Admission:   EKG:  NSR at 61 bpm.  No acute ischemia.  Normal QTc..    ** Please Note: This note has been constructed using a voice recognition system. **

## 2024-06-06 NOTE — ASSESSMENT & PLAN NOTE
Reports that she only drinks 1 day a week, with last drink just prior to arrival to the ED  Will place on CIWA protocol

## 2024-06-06 NOTE — PROGRESS NOTES
Finn Oleary is a 68 y.o. female who is currently ordered Vancomycin IV with management by the Pharmacy Consult service.  Relevant clinical data and objective / subjective history reviewed.  Vancomycin Assessment:  Indication and Goal AUC/Trough: Bone/joint infection (goal -600, trough >10)  Clinical Status:  Initial dosing  Micro:     Renal Function:  SCr: 0.67 mg/dL  CrCl: 65.6 mL/min  Renal replacement: Not on dialysis  Days of Therapy: 1  Current Dose: 1000mg load in ED  Vancomycin Plan:  New Dosinmg q12h, starting at 0900  Estimated AUC: 487 mcg*hr/mL  Estimated Trough: 10.5 mcg/mL  Next Level: 0600 on   Renal Function Monitoring: Daily BMP and UOP  Pharmacy will continue to follow closely for s/sx of nephrotoxicity, infusion reactions and appropriateness of therapy.  BMP and CBC will be ordered per protocol. We will continue to follow the patient’s culture results and clinical progress daily.    David Perez, Pharmacist

## 2024-06-06 NOTE — PHYSICAL THERAPY NOTE
PHYSICAL THERAPY CANCELLATION NOTE      Patient Name: Finn Oleary  Today's Date: 6/6/2024 06/06/24 2173   Note Type   Note type Cancelled Session   Additional Comments PT orders received, chart review performed. Pt s/p MRI which shows.   Findings consistent with osteomyelitis of the tuft of the first distal phalanx., will hold on PT eval until POC established by podiatry now that osteo is confirmed,       Yashira Mejia, PT, DPT

## 2024-06-06 NOTE — ASSESSMENT & PLAN NOTE
Slow to respond and some word finding difficulty, though alert and oriented x 4  CTh without acute abnormality  Ammonia level 80, and patient admits to noncompliance with lactulose  Suspect metabolic encephalopathy multifactorial in setting of acute alcohol intoxication, hepatic encephalopathy, as well as acute infection  Will restart lactulose with goal of 3-4 BM daily  If patient fails to improve with lactulose and treatment of infection-consider MRI brain

## 2024-06-06 NOTE — ASSESSMENT & PLAN NOTE
Maintained on Xanax 0.5 Mg twice daily with additional as needed dose daily  PDMP reviewed, continue home Xanax dosing

## 2024-06-06 NOTE — CONSULTS
Consultation - LYNDSEY   Finn Oleary 68 y.o. female MRN: [id number]  Unit/Bed#: ED 09 Encounter: [id number]      Assessment & Plan     68 y.o. year old female past medical history of alcoholic cirrhosis of the liver, esophageal varices, hepatic encephalopathy, anxiety, alcohol use and current EtOH use.  Patient presented with wound to right great toe.  Consulted for hepatic encephalopathy.    1.  Hepatic encephalopathy  2.  History of EtOH cirrhosis of the liver  3.  Abdominal pain  4.  Thrombocytopenia  On exam, patient does have some delayed response however alert and oriented, negative asterixis.  Patient admits to being noncompliant with lactulose at home.  States she does not like its taste and she states it does give her acid reflux.  Patient last seen in the office 2019.  Liver enzymes on admission noted elevated T. bili at 1.65, ammonia level 80.  Hemoglobin 12.4, platelets 102.    Patient admits to last alcoholic beverage day of admission.  Symptoms likely related to cirrhosis of the liver and noncompliance with lactulose, abdominal pain,?  Varices vs peptic vs gastric ulcer, GERD.  Thrombocytopenia likely cirrhosis related.    -RUQ US  -Monitor liver enzymes  -Pantoprazole 40 mg IVP twice daily  -Lactulose 20 g 3 times daily  -Hold Aldactone for now  -Monitor CBC/platelets    Discuss patient with Dr. Nava regarding possible inpatient versus outpatient EGD.    5.  Constipation  States she has been having increased constipation however noted to be noncompliant with lactulose.  Discussed with patient she should be having at least 2-3 bowel movements per day.  Patient was unclear when her last bowel movement was.    -Continue lactulose  -MiraLAX 17 g p.o. twice daily    6.  Osteomyelitis of great toe right foot  Patient presented with ulceration of the right great toe.  MRI ordered.  Podiatry consulted.    Inpatient consult to gastroenterology  Consult performed by: DANISH Jose  Consult ordered by:  Cassandra Parmar PA-C          Physician Requesting Consult: Danielle Zelaya MD  Reason for Consult / Principal Problem: Hepatic encephalopathy    HPI: Finn Oleary is a 68 y.o. year old female past medical history of alcoholic cirrhosis of the liver, esophageal varices, hepatic encephalopathy, anxiety, alcohol use and current EtOH use.  Patient presented with wound to right great toe.  On exam, patient is poor historian at times.  She is alert and oriented and does answer appropriate questions to medications however does have some delayed response at times.  Patient denies nausea or vomiting.  Patient states she has been constipated at home however patient has not been compliant with taking her lactulose.  She states she has been losing weight but not sure how much.  States she does have occasional hematochezia with noted internal and external hemorrhoid.  Denies melena.  States she is down to smoking 5 cigarettes/day.  States she does continue to drink beer but not on a daily basis.  She denies illicit drug use or medical marijuana.  Per EMR her last colonoscopy was 7/2015.  Last EGD 8/2019; portal hypertensive gastropathy, small esophageal varix, mid esophagus.  Mild esophageal candidiasis.        Review of Systems: Negative for 14 point exam except for HPI.    Historical Information   Past Medical History:   Diagnosis Date    Anemia     Anxiety     Ascites     Cervical cancer (HCC)     Chronic pain     Cirrhosis (HCC)     CVA (cerebral vascular accident) (HCC)     Depression     Fibromyalgia     Hepatic encephalopathy (HCC)     Opioid dependence in remission (HCC) 11/7/2022     Past Surgical History:   Procedure Laterality Date    COLONOSCOPY  07/14/2015    COLONOSCOPY  03/15/2013    Hemorrhoids    EGD  12/23/2014    HYSTERECTOMY       Social History   Social History     Substance and Sexual Activity   Alcohol Use Not Currently    Comment: Had been drinking anywhere from 3-5 drinks of hard alcohol daily  for the past 2 years, quit 8/17     Social History     Substance and Sexual Activity   Drug Use Not on file     Social History     Tobacco Use   Smoking Status Every Day   Smokeless Tobacco Never     Family History   Problem Relation Age of Onset    Lung cancer Mother     Cirrhosis Father     Cancer Sister     Colon polyps Neg Hx     Colon cancer Neg Hx        Meds/Allergies   Current Facility-Administered Medications   Medication Dose Route Frequency    acetaminophen (TYLENOL) tablet 650 mg  650 mg Oral Q8H PRN    ALPRAZolam (XANAX) tablet 0.5 mg  0.5 mg Oral BID    ALPRAZolam (XANAX) tablet 0.5 mg  0.5 mg Oral Daily PRN    cefTRIAXone (ROCEPHIN) IVPB (premix in dextrose) 1,000 mg 50 mL  1,000 mg Intravenous Q24H    enoxaparin (LOVENOX) subcutaneous injection 40 mg  40 mg Subcutaneous Daily    Fluticasone Furoate-Vilanterol 100-25 mcg/actuation 1 puff  1 puff Inhalation Daily    folic acid (FOLVITE) tablet 1 mg  1 mg Oral Daily    gabapentin (NEURONTIN) capsule 300 mg  300 mg Oral HS    lactulose (CHRONULAC) oral solution 20 g  20 g Oral TID    magnesium sulfate 2 g/50 mL IVPB (premix) 2 g  2 g Intravenous Once    metoprolol succinate (TOPROL-XL) 24 hr tablet 12.5 mg  12.5 mg Oral Daily    multi-electrolyte (PLASMALYTE-A/ISOLYTE-S PH 7.4) IV solution  75 mL/hr Intravenous Continuous    nicotine (NICODERM CQ) 14 mg/24hr TD 24 hr patch 1 patch  1 patch Transdermal Daily    ondansetron (ZOFRAN) injection 4 mg  4 mg Intravenous Q6H PRN    pantoprazole (PROTONIX) EC tablet 40 mg  40 mg Oral Daily    thiamine tablet 100 mg  100 mg Oral Daily    vancomycin (VANCOCIN) IVPB (premix in dextrose) 750 mg 150 mL  12 mg/kg Intravenous Q12H     Not in a hospital admission.    Allergies   Allergen Reactions    Gadolinium     Nsaids     Other Palpitations     All anti depressants           Physical Exam   Constitutional: Is oriented to person, place, and time. Appears well-developed and well-nourished.  Some delayed response,  unsure residual from CVA or mild hepatic encephalopathy.  Negative asterixis.    HENT: WNL  Head: Normocephalic and atraumatic.   Eyes: Pupils are equal, round, and reactive to light.   Neck: Normal range of motion. Neck supple.   Cardiovascular: Normal rate and regular rhythm.    Pulmonary/Chest: Effort normal and breath sounds normal.   Abdominal: Soft.  Right upper and mid epigastric discomfort with palpation bowel sounds are normal.   Musculoskeletal: Normal range of motion.  Decreased use of left hand s/p CVA 2011  Extremities:  No edema  Neurological: Is alert and oriented to person, place, and time.   Skin: Skin is warm and dry.   Psychiatric: Has a normal mood and affect.       Lab Results:   Admission on 06/05/2024   Component Date Value    Ventricular Rate 06/05/2024 61     Atrial Rate 06/05/2024 61     FL Interval 06/05/2024 148     QRSD Interval 06/05/2024 66     QT Interval 06/05/2024 414     QTC Interval 06/05/2024 416     P Axis 06/05/2024 90     QRS Harrison 06/05/2024 83     T Wave Axis 06/05/2024 79     WBC 06/05/2024 4.74     RBC 06/05/2024 3.98     Hemoglobin 06/05/2024 13.3     Hematocrit 06/05/2024 39.6     MCV 06/05/2024 100 (H)     MCH 06/05/2024 33.4     MCHC 06/05/2024 33.6     RDW 06/05/2024 12.4     MPV 06/05/2024 10.4     Platelets 06/05/2024 119 (L)     nRBC 06/05/2024 0     Segmented % 06/05/2024 29 (L)     Immature Grans % 06/05/2024 0     Lymphocytes % 06/05/2024 58 (H)     Monocytes % 06/05/2024 9     Eosinophils Relative 06/05/2024 3     Basophils Relative 06/05/2024 1     Absolute Neutrophils 06/05/2024 1.38 (L)     Absolute Immature Grans 06/05/2024 0.01     Absolute Lymphocytes 06/05/2024 2.74     Absolute Monocytes 06/05/2024 0.41     Eosinophils Absolute 06/05/2024 0.15     Basophils Absolute 06/05/2024 0.05     Sodium 06/05/2024 136     Potassium 06/05/2024 3.9     Chloride 06/05/2024 96     CO2 06/05/2024 34 (H)     ANION GAP 06/05/2024 6     BUN 06/05/2024 6     Creatinine  06/05/2024 0.67     Glucose 06/05/2024 81     Calcium 06/05/2024 9.7     AST 06/05/2024 35     ALT 06/05/2024 12     Alkaline Phosphatase 06/05/2024 95     Total Protein 06/05/2024 6.5     Albumin 06/05/2024 3.5     Total Bilirubin 06/05/2024 1.65 (H)     eGFR 06/05/2024 89     LACTIC ACID 06/05/2024 1.7     Procalcitonin 06/05/2024 <0.05     Protime 06/05/2024 15.8 (H)     INR 06/05/2024 1.21 (H)     PTT 06/05/2024 35     Blood Culture 06/05/2024 Received in Microbiology Lab. Culture in Progress.     Blood Culture 06/05/2024 Received in Microbiology Lab. Culture in Progress.     Color, UA 06/05/2024 Yellow     Clarity, UA 06/05/2024 Clear     Specific Gravity, UA 06/05/2024 <1.005 (L)     pH, UA 06/05/2024 7.0     Leukocytes, UA 06/05/2024 Negative     Nitrite, UA 06/05/2024 Negative     Protein, UA 06/05/2024 Negative     Glucose, UA 06/05/2024 Negative     Ketones, UA 06/05/2024 Negative     Urobilinogen, UA 06/05/2024 <2.0     Bilirubin, UA 06/05/2024 Negative     Occult Blood, UA 06/05/2024 Negative     hs TnI 0hr 06/05/2024 4     Total CK 06/05/2024 79     Sed Rate 06/05/2024 2     CRP 06/05/2024 4.7 (H)     Ammonia 06/05/2024 80 (H)     TSH 3RD GENERATON 06/05/2024 5.782 (H)     Ethanol Lvl 06/05/2024 130 (H)     Salicylate Lvl 06/05/2024 <5     Acetaminophen Level 06/05/2024 <2 (L)     BNP 06/05/2024 454 (H)     hs TnI 2hr 06/06/2024 4     Delta 2hr hsTnI 06/06/2024 0     Free T4 06/05/2024 0.96     Sodium 06/06/2024 140     Potassium 06/06/2024 3.9     Chloride 06/06/2024 102     CO2 06/06/2024 35 (H)     ANION GAP 06/06/2024 3 (L)     BUN 06/06/2024 5     Creatinine 06/06/2024 0.60     Glucose 06/06/2024 76     Calcium 06/06/2024 9.2     eGFR 06/06/2024 93     WBC 06/06/2024 3.62 (L)     RBC 06/06/2024 3.76 (L)     Hemoglobin 06/06/2024 12.4     Hematocrit 06/06/2024 37.4     MCV 06/06/2024 100 (H)     MCH 06/06/2024 33.0     MCHC 06/06/2024 33.2     RDW 06/06/2024 12.4     Platelets 06/06/2024 102 (L)      MPV 06/06/2024 10.3     Magnesium 06/06/2024 1.3 (L)     Phosphorus 06/06/2024 3.0     Protime 06/06/2024 17.2 (H)     INR 06/06/2024 1.36 (H)     PTT 06/06/2024 33      Imaging Studies: I have personally reviewed pertinent reports.      EKG, Pathology, and Other Studies: I have personally reviewed pertinent reports.    Counseling / Coordination of Care  Total floor / unit time spent today 30 minutes.

## 2024-06-06 NOTE — ED NOTES
2 bags of medications sent to pharmacy. Duncan ESPINOSA witness.      Tierney Cherry RN  06/06/24 2097

## 2024-06-06 NOTE — ED NOTES
Pt refusing lactulose. Pt states it is a stupid time of night to take a medication that will make her go to the bathroom in a room without a bathroom. Provider notified.      Tierney Cherry RN  06/06/24 [id number]

## 2024-06-06 NOTE — ED PROVIDER NOTES
History  Chief Complaint   Patient presents with    Altered Mental Status     Pt reports to er via ems for altered mental status, pt reports issues with her feet, pt reports toe on l;eft foot was amputated and she has issue with big toe on right foot, ems reports pt is inconsistent with her meds and she is being treated for cirosis      68-year-old female with history of cirrhosis, anxiety, anemia, chronic pain hepatic encephalopathy altered mental status came in via ambulance, states that for the last few days she has been having pain in her right big toe, noticed a sore in there several days ago but waited did not seek care, sure if she had a fever, no nausea no vomiting.  Tonight drank some alcohol, states that she drinks beer but not daily.  Called the ambulance because she woke up and noticed that her toe was getting worse.  Also states that she has been having swelling in her legs, no shortness of breath        Prior to Admission Medications   Prescriptions Last Dose Informant Patient Reported? Taking?   ALPRAZolam (XANAX) 0.5 mg tablet   No No   Sig: take 1 tablet by mouth twice a day for anxiety.   Ipratropium-Albuterol (COMBIVENT IN)  Self Yes No   Sig: Inhale 2 (two) times a day   LACTULOSE PO  Self Yes No   Sig: Take 10 g by mouth daily   fluticasone-vilanterol (BREO ELLIPTA) 100-25 mcg/inh inhaler  Self Yes No   Sig: Inhale 1 puff daily Rinse mouth after use.   folic acid (FOLVITE) 1 mg tablet  Self Yes No   Sig: Take by mouth daily   furosemide (LASIX) 20 mg tablet   No No   Sig: Take 20 mg daily alternating with 40 mg daily.   gabapentin (NEURONTIN) 300 mg capsule  Self Yes No   Sig: Take 300 mg by mouth daily at bedtime   metoprolol succinate (TOPROL-XL) 25 mg 24 hr tablet  Self Yes No   Sig: Take 12.5 mg by mouth daily   pantoprazole (PROTONIX) 40 mg tablet   No No   Sig: Take 1 tablet (40 mg total) by mouth daily   spironolactone (ALDACTONE) 50 mg tablet  Self Yes No   Sig: Take 50 mg by mouth 2  (two) times a day   thiamine (VITAMIN B1) 100 mg tablet   Yes No   Sig: Take 100 mg by mouth daily      Facility-Administered Medications: None       Past Medical History:   Diagnosis Date    Anemia     Anxiety     Ascites     Cervical cancer (HCC)     Chronic pain     Cirrhosis (HCC)     CVA (cerebral vascular accident) (HCC)     Depression     Fibromyalgia     Hepatic encephalopathy (HCC)     Opioid dependence in remission (HCC) 11/7/2022       Past Surgical History:   Procedure Laterality Date    COLONOSCOPY  07/14/2015    COLONOSCOPY  03/15/2013    Hemorrhoids    EGD  12/23/2014    HYSTERECTOMY         Family History   Problem Relation Age of Onset    Lung cancer Mother     Cirrhosis Father     Cancer Sister     Colon polyps Neg Hx     Colon cancer Neg Hx      I have reviewed and agree with the history as documented.    E-Cigarette/Vaping     E-Cigarette/Vaping Substances    Nicotine No     Flavoring No      Social History     Tobacco Use    Smoking status: Every Day    Smokeless tobacco: Never   Substance Use Topics    Alcohol use: Not Currently     Comment: Had been drinking anywhere from 3-5 drinks of hard alcohol daily for the past 2 years, quit 8/17       Review of Systems   Constitutional:  Negative for appetite change and fever.   HENT:  Negative for rhinorrhea and sore throat.    Eyes:  Negative for photophobia and visual disturbance.   Respiratory:  Negative for cough, chest tightness and wheezing.    Cardiovascular:  Positive for leg swelling. Negative for chest pain and palpitations.   Gastrointestinal:  Negative for abdominal distention, abdominal pain, blood in stool, constipation and diarrhea.   Genitourinary:  Negative for dysuria, flank pain, frequency, hematuria and urgency.   Musculoskeletal:  Negative for back pain.   Skin:  Positive for wound. Negative for rash.   Neurological:  Negative for dizziness, weakness and headaches.   All other systems reviewed and are negative.      Physical  Exam  Physical Exam  Vitals and nursing note reviewed.   Constitutional:       Appearance: She is well-developed.   HENT:      Head: Normocephalic and atraumatic.   Eyes:      Pupils: Pupils are equal, round, and reactive to light.   Cardiovascular:      Rate and Rhythm: Normal rate and regular rhythm.      Heart sounds: No murmur heard.     No friction rub. No gallop.      Comments: Symmetrical bilateral pitting edema to mid calves  Pulmonary:      Effort: Pulmonary effort is normal.      Breath sounds: No wheezing or rales.   Chest:      Chest wall: No tenderness.   Abdominal:      General: There is no distension.      Palpations: Abdomen is soft. There is no mass.      Tenderness: There is no guarding or rebound.   Musculoskeletal:      Cervical back: Normal range of motion and neck supple.   Feet:      Comments: Patient status post left big toe amputation minimal overlying erythema, no warmth, palpable pedal pulses    Right toe with deep ulceration, surrounding erythema, discharge, otherwise sensory intact, intact pedal pulses  Skin:     General: Skin is warm and dry.   Neurological:      Mental Status: She is alert and oriented to person, place, and time.      GCS: GCS eye subscore is 4. GCS verbal subscore is 5. GCS motor subscore is 6.      Comments: Patient is slow to answer questions however does answer questions appropriately, no word slurring, no focal deficits on exam               Vital Signs  ED Triage Vitals   Temperature Pulse Respirations Blood Pressure SpO2   06/05/24 2152 06/05/24 2152 06/05/24 2152 06/05/24 2152 06/05/24 2152   98 °F (36.7 °C) 62 18 132/79 96 %      Temp Source Heart Rate Source Patient Position - Orthostatic VS BP Location FiO2 (%)   06/05/24 2152 06/05/24 2152 -- 06/06/24 0006 --   Temporal Monitor  Left arm       Pain Score       --                  Vitals:    06/05/24 2152 06/05/24 2231 06/06/24 0006   BP: 132/79 146/63 139/81   Pulse: 62 62 63         Visual Acuity  Visual  Acuity      Flowsheet Row Most Recent Value   L Pupil Size (mm) 3   R Pupil Size (mm) 3            ED Medications  Medications   cefTRIAXone (ROCEPHIN) IVPB (premix in dextrose) 1,000 mg 50 mL (has no administration in time range)   vancomycin (VANCOCIN) 15 mg/kg in sodium chloride 0.9 % 500 mL IVPB (has no administration in time range)       Diagnostic Studies  Results Reviewed       Procedure Component Value Units Date/Time    HS Troponin I 2hr [[id number]] Collected: 06/06/24 0013    Lab Status: In process Specimen: Blood from Arm, Left Updated: 06/06/24 0019    HS Troponin I 4hr [[id number]]     Lab Status: No result Specimen: Blood     Salicylate level [[id number]]  (Normal) Collected: 06/05/24 2229    Lab Status: Final result Specimen: Blood from Arm, Left Updated: 06/05/24 2332     Salicylate Lvl <5 mg/dL     Comprehensive metabolic panel [[id number]]  (Abnormal) Collected: 06/05/24 2229    Lab Status: Final result Specimen: Blood from Arm, Left Updated: 06/05/24 2324     Sodium 136 mmol/L      Potassium 3.9 mmol/L      Chloride 96 mmol/L      CO2 34 mmol/L      ANION GAP 6 mmol/L      BUN 6 mg/dL      Creatinine 0.67 mg/dL      Glucose 81 mg/dL      Calcium 9.7 mg/dL      AST 35 U/L      ALT 12 U/L      Alkaline Phosphatase 95 U/L      Total Protein 6.5 g/dL      Albumin 3.5 g/dL      Total Bilirubin 1.65 mg/dL      eGFR 89 ml/min/1.73sq m     Narrative:      [employer] guidelines for Chronic Kidney Disease (CKD):     Stage 1 with normal or high GFR (GFR > 90 mL/min/1.73 square meters)    Stage 2 Mild CKD (GFR = 60-89 mL/min/1.73 square meters)    Stage 3A Moderate CKD (GFR = 45-59 mL/min/1.73 square meters)    Stage 3B Moderate CKD (GFR = 30-44 mL/min/1.73 square meters)    Stage 4 Severe CKD (GFR = 15-29 mL/min/1.73 square meters)    Stage 5 End Stage CKD (GFR <15 mL/min/1.73 square meters)  Note: GFR calculation is accurate only with a steady state creatinine    CK [[id number]]   (Normal) Collected: 06/05/24 2229    Lab Status: Final result Specimen: Blood from Arm, Left Updated: 06/05/24 2324     Total CK 79 U/L     C-reactive protein [[id number]]  (Abnormal) Collected: 06/05/24 2229    Lab Status: Final result Specimen: Blood from Arm, Left Updated: 06/05/24 2324     CRP 4.7 mg/L     Acetaminophen level-If concentration is detectable, please discuss with medical  on call. [[id number]]  (Abnormal) Collected: 06/05/24 2229    Lab Status: Final result Specimen: Blood from Arm, Left Updated: 06/05/24 2324     Acetaminophen Level <2 ug/mL     TSH, 3rd generation with Free T4 reflex [[id number]]  (Abnormal) Collected: 06/05/24 2229    Lab Status: Final result Specimen: Blood from Arm, Left Updated: 06/05/24 2318     TSH 3RD GENERATON 5.782 uIU/mL     T4, free [[id number]] Collected: 06/05/24 2229    Lab Status: In process Specimen: Blood from Arm, Left Updated: 06/05/24 2318    Procalcitonin [[id number]]  (Normal) Collected: 06/05/24 2229    Lab Status: Final result Specimen: Blood from Arm, Left Updated: 06/05/24 2309     Procalcitonin <0.05 ng/ml     HS Troponin 0hr (reflex protocol) [[id number]]  (Normal) Collected: 06/05/24 2229    Lab Status: Final result Specimen: Blood from Arm, Left Updated: 06/05/24 2308     hs TnI 0hr 4 ng/L     B-Type Natriuretic Peptide(BNP) [[id number]]  (Abnormal) Collected: 06/05/24 2229    Lab Status: Final result Specimen: Blood from Arm, Left Updated: 06/05/24 2308      pg/mL     Protime-INR [[id number]]  (Abnormal) Collected: 06/05/24 2229    Lab Status: Final result Specimen: Blood from Arm, Left Updated: 06/05/24 2306     Protime 15.8 seconds      INR 1.21    APTT [[id number]]  (Normal) Collected: 06/05/24 2229    Lab Status: Final result Specimen: Blood from Arm, Left Updated: 06/05/24 2306     PTT 35 seconds     Lactic acid [[id number]]  (Normal) Collected: 06/05/24 2229    Lab Status: Final result Specimen: Blood from Arm, Left Updated: 06/05/24  2259     LACTIC ACID 1.7 mmol/L     Narrative:      Result may be elevated if tourniquet was used during collection.    Ammonia [[id number]]  (Abnormal) Collected: 06/05/24 2229    Lab Status: Final result Specimen: Blood from Arm, Left Updated: 06/05/24 2259     Ammonia 80 umol/L     Ethanol [[id number]]  (Abnormal) Collected: 06/05/24 2229    Lab Status: Final result Specimen: Blood from Arm, Left Updated: 06/05/24 2258     Ethanol Lvl 130 mg/dL     Sedimentation rate, automated [[id number]]  (Normal) Collected: 06/05/24 2229    Lab Status: Final result Specimen: Blood from Arm, Left Updated: 06/05/24 2252     Sed Rate 2 mm/hour     UA w Reflex to Microscopic w Reflex to Culture [[id number]]  (Abnormal) Collected: 06/05/24 2247    Lab Status: Final result Specimen: Urine, Clean Catch Updated: 06/05/24 2251     Color, UA Yellow     Clarity, UA Clear     Specific Gravity, UA <1.005     pH, UA 7.0     Leukocytes, UA Negative     Nitrite, UA Negative     Protein, UA Negative mg/dl      Glucose, UA Negative mg/dl      Ketones, UA Negative mg/dl      Urobilinogen, UA <2.0 mg/dl      Bilirubin, UA Negative     Occult Blood, UA Negative    CBC and differential [[id number]]  (Abnormal) Collected: 06/05/24 2229    Lab Status: Final result Specimen: Blood from Arm, Left Updated: 06/05/24 2248     WBC 4.74 Thousand/uL      RBC 3.98 Million/uL      Hemoglobin 13.3 g/dL      Hematocrit 39.6 %       fL      MCH 33.4 pg      MCHC 33.6 g/dL      RDW 12.4 %      MPV 10.4 fL      Platelets 119 Thousands/uL      nRBC 0 /100 WBCs      Segmented % 29 %      Immature Grans % 0 %      Lymphocytes % 58 %      Monocytes % 9 %      Eosinophils Relative 3 %      Basophils Relative 1 %      Absolute Neutrophils 1.38 Thousands/µL      Absolute Immature Grans 0.01 Thousand/uL      Absolute Lymphocytes 2.74 Thousands/µL      Absolute Monocytes 0.41 Thousand/µL      Eosinophils Absolute 0.15 Thousand/µL      Basophils Absolute 0.05 Thousands/µL      Blood culture #2 [[id number]] Collected: 06/05/24 2229    Lab Status: In process Specimen: Blood from Arm, Right Updated: 06/05/24 2240    Blood culture #1 [[id number]] Collected: 06/05/24 2229    Lab Status: In process Specimen: Blood from Arm, Left Updated: 06/05/24 2239                   CT head without contrast   Final Result by Hilton Ortiz MD (06/05 2355)      1.  No acute intracranial abnormality.  Chronic microangiopathic changes.   2.  Old right frontoparietal infarct                  Workstation performed: [id number]         XR chest portable - 1 view    (Results Pending)   XR foot 3+ views RIGHT    (Results Pending)              Procedures  Procedures         ED Course  ED Course as of 06/06/24 0020   Wed Jun 05, 2024 2208 Procedure Note: EKG  Date/Time: 06/05/24 10:08 PM   Performed by: DAQUAN CURTIS  Authorized by: DAQUAN CURTIS  Indications / Diagnosis: AMS  ECG reviewed by me, the ED Provider: yes   The EKG demonstrates:  Rhythm: normal sinus  Intervals: normal intervals  Axis: normal axis  QRS/Blocks:normal QRS  ST Changes: No acute ST Changes, no STD/MOOSE.       u Jun 06, 2024   0014 Patient with toe infection, altered mental status possibly in setting of alcohol intoxication versus hepatic encephalopathy with elevated ammonia will admit for further care                            Initial Sepsis Screening       Row Name 06/06/24 0017                Is the patient's history suggestive of a new or worsening infection? Yes (Proceed)  -EB        Suspected source of infection wound infection  -EB        Indicate SIRS criteria --        Are two or more of the above signs & symptoms of infection both present and new to the patient? No  -EB                  User Key  (r) = Recorded By, (t) = Taken By, (c) = Cosigned By      Initials Name Provider Type    EB Daquan Curtis DO Physician                    SBIRT 20yo+      Flowsheet Row Most Recent Value   Initial Alcohol Screen: US AUDIT-C     1. How often do  you have a drink containing alcohol? 0 Filed at: 06/05/2024 2154   2. How many drinks containing alcohol do you have on a typical day you are drinking?  0 Filed at: 06/05/2024 2154   3a. Male UNDER 65: How often do you have five or more drinks on one occasion? 0 Filed at: 06/05/2024 2154   3b. FEMALE Any Age, or MALE 65+: How often do you have 4 or more drinks on one occassion? 0 Filed at: 06/05/2024 2154   Audit-C Score 0 Filed at: 06/05/2024 2154   NANCY: How many times in the past year have you...    Used an illegal drug or used a prescription medication for non-medical reasons? Never Filed at: 06/05/2024 2154                      Medical Decision Making  68-year-old female with altered mental status, does have history alcohol cirrhosis, admits to drinking today, differential diagnosis includes hepatic encephalopathy, intoxication, infection specifically concern for infection of her right great toe  Patient is saturating 93% on room air will obtain x-ray to evaluate for pulmonary edema, pneumonia  KG to evaluate for arrhythmia lab work to evaluate for electrolyte abnormalities dehydration    Amount and/or Complexity of Data Reviewed  Labs: ordered.  Radiology: ordered.    Risk  Prescription drug management.  Decision regarding hospitalization.             Disposition  Final diagnoses:   Altered mental status   Alcohol intoxication (HCC)   Cellulitis   Osteomyelitis of toe (HCC)     Time reflects when diagnosis was documented in both MDM as applicable and the Disposition within this note       Time User Action Codes Description Comment    6/5/2024 11:15 PM Johanny Christine [R41.82] Altered mental status     6/5/2024 11:16 PM Johanny Christine [F10.929] Alcohol intoxication (HCC)     6/6/2024 12:13 AM Johanny Christine [L03.90] Cellulitis     6/6/2024 12:13 AM Johanny Christine [M86.9] Osteomyelitis of toe (HCC)           ED Disposition       ED Disposition   Admit    Condition   Stable    Date/Time   Thu Jun 6, 2024 0019     Comment   Case was discussed with MERCEDEZ and the patient's admission status was agreed to be Admission Status: inpatient status to the service of Dr. Zelaya .               Follow-up Information    None         Patient's Medications   Discharge Prescriptions    No medications on file       No discharge procedures on file.    PDMP Review         Value Time User    PDMP Reviewed  Yes 9/8/2023  3:47 PM Jomar Ramirez MD            ED Provider  Electronically Signed by             Johanny Christine DO  06/06/24 0020

## 2024-06-06 NOTE — PLAN OF CARE

## 2024-06-06 NOTE — ASSESSMENT & PLAN NOTE
Longstanding history of alcoholic cirrhosis complicated by ascites, esophageal varices, and hepatic encephalopathy in the past  Decompensated in the setting of hepatic encephalopathy currently, but no evidence of ascites and labs are not overtly abnormal with mildly elevated T. bili at 1.65, no ascites, and only slight elevation of coags with INR at 1.21 and PT at 15.8  GI consulted  Resume lactulose  Admits to noncompliance with Lasix and spironolactone, held on ordering on admit as possibly will need surgical intervention for right great toe also with concern for osteomyelitis

## 2024-06-06 NOTE — CONSULTS
[facility] Podiatry - Consultation    Patient Information:   Finn Oleary 68 y.o. female MRN: [id number]  Unit/Bed#: -01 Encounter: [id number]  PCP: Scot Brink MD  Date of Admission:  6/5/2024  Date of Consultation: 06/06/24  Requesting Physician: Danielle Zelaya MD      ASSESSMENT:    Finn Oleary is a 68 y.o. female with:    Ulceration of right great toe with cellulitis, rule out osteomyelitis  Alcoholic peripheral neuropathy  Metabolic encephalopathy, alcoholic cirrhosis, thrombocytopenia, elevated TSH, alcohol abuse, generalized anxiety disorder, tobacco use disorder    PLAN:    Initial inpatient hospital consultation and bedside pedal examination  Reviewed patient's medical records, ED visit note, internal medicine H&P, pertinent blood work, x-ray from 6/5/2024 of right foot was evaluated, right great toe ulceration is noted on x-ray, I do not note any osteomyelitis, MRI has been ordered by primary team and is not pending.  Ulceration was debrided through subcuticular tissues, please see procedure note, dressed with Dermagran gauze dry dressing.  IV antibiotics per primary team.  At this time no surgical intervention is necessary unless indicated by MRI, patient does not need to be n.p.o. at this time, this was discussed with primary team.  Patient may heel touch weight-bear as tolerated with Darco wedge shoe, PT and weightbearing restriction orders were placed.  Patient with onychomycoses, recommend outpatient care for manual and mechanical debridement for effective treatment.  Rest of care per primary team.  Thank you for consultation, we will gladly follow along.    Debridement Procedure:    After  Verbal Consent was obtained and time out performed. Wound located distal tip right great toe was  excisionally Surgical- Subcutaneous  (CPT: 31060) debrided using scapel. Pre-debridement wound measures 0.3 cm x 0.8 cm x 0.1 cm.  Pain was controlled by Lack of sensation due to Neuropathy . Post  debridement measurement 0.5 cm x 1.0 cm x 0.3 cm for a total of 0.5 square centimeters, with wound appearing Fresh bleeding tissue, viable, and granular. 100%  of wound debrided. Tissue debrided includes depth of Subcutaneous with devitalized tissue being debrided including Hyperkeratosis, Slough, Fibrous, and Biofilm.  with a small amount of bleeding bleeding was noted during procedure. Hemostasis was achieved using pressure. Pain noted during procedure rated as 0. Pain noted after procedure rated as 0. Procedure was Well tolerated by patient.     SUBJECTIVE    History of Present Illness:    Finn Oleary is a 68 y.o. female with past medical history of alcoholic cirrhosis of liver decompensated with history of ascites, varices, hepatic encephalopathy, anxiety, tobacco abuse who presented to ED yesterday with right great toe swelling, she reported her toe has been painful intermittently over the last few weeks but noticed possible bruising to her right great toe.  She denies any fevers, podiatry has been consulted for care of ulceration and rule out osteomyelitis.     Review of Systems:    Constitutional: Negative.    HENT: Negative.    Eyes: Negative.    Respiratory: Negative.    Cardiovascular: Negative.    Gastrointestinal: Negative.    Musculoskeletal: Swelling and pain of right great toe  Skin: Ulceration right great toe  Neurological: Numbness to bilateral feet  Psych: Negative.     Past Medical and Surgical History:     Past Medical History:   Diagnosis Date    Anemia     Anxiety     Ascites     Cervical cancer (HCC)     Chronic pain     Cirrhosis (HCC)     CVA (cerebral vascular accident) (HCC)     Depression     Fibromyalgia     Hepatic encephalopathy (HCC)     Opioid dependence in remission (HCC) 11/7/2022       Past Surgical History:   Procedure Laterality Date    COLONOSCOPY  07/14/2015    COLONOSCOPY  03/15/2013    Hemorrhoids    EGD  12/23/2014    HYSTERECTOMY         Meds/Allergies:      Medications  Prior to Admission:     ALPRAZolam (XANAX) 0.5 mg tablet    fluticasone-vilanterol (BREO ELLIPTA) 100-25 mcg/inh inhaler    gabapentin (NEURONTIN) 300 mg capsule    Ipratropium-Albuterol (COMBIVENT IN)    metoprolol succinate (TOPROL-XL) 25 mg 24 hr tablet    folic acid (FOLVITE) 1 mg tablet    furosemide (LASIX) 20 mg tablet    LACTULOSE PO    pantoprazole (PROTONIX) 40 mg tablet    spironolactone (ALDACTONE) 50 mg tablet    thiamine (VITAMIN B1) 100 mg tablet    Allergies   Allergen Reactions    Gadolinium     Nsaids     Other Palpitations     All anti depressants       Social History:     Marital Status:     Substance Use History:   Social History     Substance and Sexual Activity   Alcohol Use Not Currently    Comment: Had been drinking anywhere from 3-5 drinks of hard alcohol daily for the past 2 years, quit 8/17     Social History     Tobacco Use   Smoking Status Every Day   Smokeless Tobacco Never     Social History     Substance and Sexual Activity   Drug Use Not on file       Family History:    Family History   Problem Relation Age of Onset    Lung cancer Mother     Cirrhosis Father     Cancer Sister     Colon polyps Neg Hx     Colon cancer Neg Hx          OBJECTIVE:    Vitals:   Blood Pressure: 162/72 (06/06/24 1146)  Pulse: 68 (06/06/24 1146)  Temperature: (!) 96.6 °F (35.9 °C) (06/06/24 1146)  Temp Source: Temporal (06/06/24 1146)  Respirations: 16 (06/06/24 1100)  Weight - Scale: 62.6 kg (138 lb 0.1 oz) (06/06/24 0050)  SpO2: 97 % (06/06/24 1146)    Physical Exam    General Appearance: Alert, cooperative, no distress.  HEENT: Head normocephalic, atraumatic, without obvious abnormality.  Heart: Normal rate and rhythm.  Lungs: Non-labored breathing. No respiratory distress.  Abdomen: Without distension.  Psychiatric: AAOx3  Lower Extremity:  Vascular:   Pedal pulses are present. CRT < 3 seconds at the digits. +1/4 edema noted at bilateral lower extremities. Pedal hair is absent. Skin temperature  is WNL bilaterally.    Musculoskeletal:  MMT is 5/5 in all muscle compartments bilaterally. ROM at the 1st MPJ and ankle joint are WNL bilaterally with the leg extended. No Pain on palpation of BL foot and ankle. No gross deformities noted.  Prior partial left hallux and left second digit amputations    Dermatological:  Lower extremity wounds as noted below:    Wound (1) located distal tip right great toe, measures approximately 0.3 cm x 0.8 cm x 0.1 cm  without sinus tracking or undermining. Wound bed appears fibrotic with no drainage.  Deepest tissue noted in base is subq. Malodor is not noticed. Wound edge appears Attached. Loren-wound skin appears calloused. Probe to bone is negative . Signs of infection are not present at this time.     Neurological:  Gross sensation is diminished  absent. Protective sensation is diminished. Patient Reports numbness and/or paresthesias.    Clinical Images 06/06/24:            Additional Data:     Lab Results: I have personally reviewed pertinent labs including:    Results from last 7 days   Lab Units 06/06/24  0517 06/05/24  2229   WBC Thousand/uL 3.62* 4.74   HEMOGLOBIN g/dL 12.4 13.3   HEMATOCRIT % 37.4 39.6   PLATELETS Thousands/uL 102* 119*   SEGS PCT %  --  29*   LYMPHO PCT %  --  58*   MONO PCT %  --  9   EOS PCT %  --  3     Results from last 7 days   Lab Units 06/06/24  0517 06/05/24  2229   POTASSIUM mmol/L 3.9 3.9   CHLORIDE mmol/L 102 96   CO2 mmol/L 35* 34*   BUN mg/dL 5 6   CREATININE mg/dL 0.60 0.67   CALCIUM mg/dL 9.2 9.7   ALK PHOS U/L  --  95   ALT U/L  --  12   AST U/L  --  35     Results from last 7 days   Lab Units 06/06/24  0517   INR  1.36*       Cultures: I have personally reviewed pertinent cultures including:    Results from last 7 days   Lab Units 06/05/24  2229   BLOOD CULTURE  Received in Microbiology Lab. Culture in Progress.  Received in Microbiology Lab. Culture in Progress.           Imaging: I have personally reviewed pertinent films in  "PACS.  Pathology, and Other Studies: I have personally reviewed pertinent reports.        ** Please Note: Portions of the record may have been created with voice recognition software. Occasional wrong word or \"sound a like\" substitutions may have occurred due to the inherent limitations of voice recognition software. Read the chart carefully and recognize, using context, where substitutions have occurred. **   "

## 2024-06-06 NOTE — ASSESSMENT & PLAN NOTE
Endorses right great toe pain and swelling for at least the last few days, however she is unclear if true time of onset  Deep ulcer noted to right great toe, concern for osteomyelitis-some questionable cortical disruption on x-ray  CRP 4.7, ESR 2  Continue ceftriaxone and vancomycin for now-avoided cefepime due to concurrent hepatic encephalopathy  MRI right foot ordered  Podiatry consulted  N.p.o. until cleared by podiatry

## 2024-06-06 NOTE — ASSESSMENT & PLAN NOTE
Platelets 119 K on admit  Secondary to cirrhosis of the liver  Hold VTE prophylaxis if platelets drop below 15 K or if INR becomes > 2.0

## 2024-06-06 NOTE — PROGRESS NOTES
Brief Note - Infectious Disease   Finn Oleary 68 y.o. female MRN: [id number]  Unit/Bed#: -01 Encounter: [id number]    ##########  PRELIMINARY assessment and recommendations below.   Full report to follow.  Please DO NOT HESITATE TO CALL OR TEXT with questions, or should a change in the patient's clinical status warrant.  ##########      Assessment/Plan:  Consult noted, chart reviewed. Pt. admitted with osteomyelitis, cellulitis.  Further imaging/podiatry workup pending. Currently on vancomycin, ceftriaxone. Clinically stable.     ===> For now, please CONTINUE CURRENT ANTIMICROBIAL THERAPY.  ===> Will f/u with pt. formally in 24h or so when more culture data are available to guide evaluation and management.       ===> Will follow

## 2024-06-07 ENCOUNTER — ANESTHESIA EVENT (INPATIENT)
Dept: PERIOP | Facility: HOSPITAL | Age: 69
DRG: 503 | End: 2024-06-07
Payer: MEDICARE

## 2024-06-07 ENCOUNTER — APPOINTMENT (INPATIENT)
Dept: RADIOLOGY | Facility: HOSPITAL | Age: 69
DRG: 503 | End: 2024-06-07
Payer: MEDICARE

## 2024-06-07 ENCOUNTER — ANESTHESIA (INPATIENT)
Dept: PERIOP | Facility: HOSPITAL | Age: 69
DRG: 503 | End: 2024-06-07
Payer: MEDICARE

## 2024-06-07 ENCOUNTER — APPOINTMENT (INPATIENT)
Dept: ULTRASOUND IMAGING | Facility: HOSPITAL | Age: 69
DRG: 503 | End: 2024-06-07
Payer: MEDICARE

## 2024-06-07 PROBLEM — R78.81 POSITIVE BLOOD CULTURE: Status: ACTIVE | Noted: 2024-06-07

## 2024-06-07 LAB
ALBUMIN SERPL BCP-MCNC: 2.6 G/DL (ref 3.5–5)
ALP SERPL-CCNC: 77 U/L (ref 34–104)
ALT SERPL W P-5'-P-CCNC: 9 U/L (ref 7–52)
ANION GAP SERPL CALCULATED.3IONS-SCNC: 1 MMOL/L (ref 4–13)
AST SERPL W P-5'-P-CCNC: 26 U/L (ref 13–39)
ATRIAL RATE: 61 BPM
BASOPHILS # BLD AUTO: 0.03 THOUSANDS/ÂΜL (ref 0–0.1)
BASOPHILS NFR BLD AUTO: 1 % (ref 0–1)
BILIRUB SERPL-MCNC: 1.25 MG/DL (ref 0.2–1)
BUN SERPL-MCNC: 9 MG/DL (ref 5–25)
CALCIUM ALBUM COR SERPL-MCNC: 9.9 MG/DL (ref 8.3–10.1)
CALCIUM SERPL-MCNC: 8.8 MG/DL (ref 8.4–10.2)
CHLORIDE SERPL-SCNC: 103 MMOL/L (ref 96–108)
CO2 SERPL-SCNC: 33 MMOL/L (ref 21–32)
CREAT SERPL-MCNC: 0.78 MG/DL (ref 0.6–1.3)
EOSINOPHIL # BLD AUTO: 0.08 THOUSAND/ÂΜL (ref 0–0.61)
EOSINOPHIL NFR BLD AUTO: 2 % (ref 0–6)
ERYTHROCYTE [DISTWIDTH] IN BLOOD BY AUTOMATED COUNT: 12.1 % (ref 11.6–15.1)
GFR SERPL CREATININE-BSD FRML MDRD: 77 ML/MIN/1.73SQ M
GLUCOSE SERPL-MCNC: 86 MG/DL (ref 65–140)
HCT VFR BLD AUTO: 36.5 % (ref 34.8–46.1)
HGB BLD-MCNC: 12.3 G/DL (ref 11.5–15.4)
IMM GRANULOCYTES # BLD AUTO: 0.02 THOUSAND/UL (ref 0–0.2)
IMM GRANULOCYTES NFR BLD AUTO: 1 % (ref 0–2)
LYMPHOCYTES # BLD AUTO: 1.68 THOUSANDS/ÂΜL (ref 0.6–4.47)
LYMPHOCYTES NFR BLD AUTO: 48 % (ref 14–44)
MAGNESIUM SERPL-MCNC: 1.6 MG/DL (ref 1.9–2.7)
MCH RBC QN AUTO: 33.2 PG (ref 26.8–34.3)
MCHC RBC AUTO-ENTMCNC: 33.7 G/DL (ref 31.4–37.4)
MCV RBC AUTO: 99 FL (ref 82–98)
MONOCYTES # BLD AUTO: 0.32 THOUSAND/ÂΜL (ref 0.17–1.22)
MONOCYTES NFR BLD AUTO: 9 % (ref 4–12)
NEUTROPHILS # BLD AUTO: 1.38 THOUSANDS/ÂΜL (ref 1.85–7.62)
NEUTS SEG NFR BLD AUTO: 39 % (ref 43–75)
NRBC BLD AUTO-RTO: 0 /100 WBCS
P AXIS: 90 DEGREES
PLATELET # BLD AUTO: 96 THOUSANDS/UL (ref 149–390)
PMV BLD AUTO: 10.9 FL (ref 8.9–12.7)
POTASSIUM SERPL-SCNC: 3.7 MMOL/L (ref 3.5–5.3)
PR INTERVAL: 148 MS
PROT SERPL-MCNC: 5 G/DL (ref 6.4–8.4)
QRS AXIS: 83 DEGREES
QRSD INTERVAL: 66 MS
QT INTERVAL: 414 MS
QTC INTERVAL: 416 MS
RBC # BLD AUTO: 3.7 MILLION/UL (ref 3.81–5.12)
SODIUM SERPL-SCNC: 137 MMOL/L (ref 135–147)
T WAVE AXIS: 79 DEGREES
VENTRICULAR RATE: 61 BPM
WBC # BLD AUTO: 3.51 THOUSAND/UL (ref 4.31–10.16)

## 2024-06-07 PROCEDURE — 80053 COMPREHEN METABOLIC PANEL: CPT | Performed by: INTERNAL MEDICINE

## 2024-06-07 PROCEDURE — C9113 INJ PANTOPRAZOLE SODIUM, VIA: HCPCS | Performed by: PODIATRIST

## 2024-06-07 PROCEDURE — 28825 PARTIAL AMPUTATION OF TOE: CPT | Performed by: PODIATRIST

## 2024-06-07 PROCEDURE — NC001 PR NO CHARGE: Performed by: PODIATRIST

## 2024-06-07 PROCEDURE — 97760 ORTHOTIC MGMT&TRAING 1ST ENC: CPT

## 2024-06-07 PROCEDURE — 0Y6P0Z0 DETACHMENT AT RIGHT 1ST TOE, COMPLETE, OPEN APPROACH: ICD-10-PCS | Performed by: PODIATRIST

## 2024-06-07 PROCEDURE — 76705 ECHO EXAM OF ABDOMEN: CPT

## 2024-06-07 PROCEDURE — 88311 DECALCIFY TISSUE: CPT | Performed by: PATHOLOGY

## 2024-06-07 PROCEDURE — 88305 TISSUE EXAM BY PATHOLOGIST: CPT | Performed by: PATHOLOGY

## 2024-06-07 PROCEDURE — 87040 BLOOD CULTURE FOR BACTERIA: CPT | Performed by: PHYSICIAN ASSISTANT

## 2024-06-07 PROCEDURE — 99232 SBSQ HOSP IP/OBS MODERATE 35: CPT | Performed by: INTERNAL MEDICINE

## 2024-06-07 PROCEDURE — 85025 COMPLETE CBC W/AUTO DIFF WBC: CPT | Performed by: INTERNAL MEDICINE

## 2024-06-07 PROCEDURE — C9113 INJ PANTOPRAZOLE SODIUM, VIA: HCPCS | Performed by: NURSE PRACTITIONER

## 2024-06-07 PROCEDURE — 93010 ELECTROCARDIOGRAM REPORT: CPT | Performed by: INTERNAL MEDICINE

## 2024-06-07 PROCEDURE — 83735 ASSAY OF MAGNESIUM: CPT | Performed by: INTERNAL MEDICINE

## 2024-06-07 PROCEDURE — 99232 SBSQ HOSP IP/OBS MODERATE 35: CPT | Performed by: PHYSICIAN ASSISTANT

## 2024-06-07 PROCEDURE — 73630 X-RAY EXAM OF FOOT: CPT

## 2024-06-07 RX ORDER — FENTANYL CITRATE/PF 50 MCG/ML
25 SYRINGE (ML) INJECTION
Status: DISCONTINUED | OUTPATIENT
Start: 2024-06-07 | End: 2024-06-07 | Stop reason: HOSPADM

## 2024-06-07 RX ORDER — OXYCODONE HYDROCHLORIDE 5 MG/1
5 TABLET ORAL EVERY 4 HOURS PRN
Status: DISCONTINUED | OUTPATIENT
Start: 2024-06-07 | End: 2024-06-10 | Stop reason: HOSPADM

## 2024-06-07 RX ORDER — OXYCODONE HYDROCHLORIDE 10 MG/1
10 TABLET ORAL EVERY 4 HOURS PRN
Status: DISCONTINUED | OUTPATIENT
Start: 2024-06-07 | End: 2024-06-10 | Stop reason: HOSPADM

## 2024-06-07 RX ORDER — PROPOFOL 10 MG/ML
INJECTION, EMULSION INTRAVENOUS CONTINUOUS PRN
Status: DISCONTINUED | OUTPATIENT
Start: 2024-06-07 | End: 2024-06-07

## 2024-06-07 RX ORDER — FENTANYL CITRATE 50 UG/ML
INJECTION, SOLUTION INTRAMUSCULAR; INTRAVENOUS AS NEEDED
Status: DISCONTINUED | OUTPATIENT
Start: 2024-06-07 | End: 2024-06-07

## 2024-06-07 RX ORDER — PROPOFOL 10 MG/ML
INJECTION, EMULSION INTRAVENOUS AS NEEDED
Status: DISCONTINUED | OUTPATIENT
Start: 2024-06-07 | End: 2024-06-07

## 2024-06-07 RX ORDER — KETAMINE HCL IN NACL, ISO-OSM 100MG/10ML
SYRINGE (ML) INJECTION AS NEEDED
Status: DISCONTINUED | OUTPATIENT
Start: 2024-06-07 | End: 2024-06-07

## 2024-06-07 RX ORDER — LIDOCAINE HYDROCHLORIDE 10 MG/ML
INJECTION, SOLUTION EPIDURAL; INFILTRATION; INTRACAUDAL; PERINEURAL AS NEEDED
Status: DISCONTINUED | OUTPATIENT
Start: 2024-06-07 | End: 2024-06-07

## 2024-06-07 RX ORDER — SODIUM CHLORIDE, SODIUM LACTATE, POTASSIUM CHLORIDE, CALCIUM CHLORIDE 600; 310; 30; 20 MG/100ML; MG/100ML; MG/100ML; MG/100ML
INJECTION, SOLUTION INTRAVENOUS CONTINUOUS PRN
Status: DISCONTINUED | OUTPATIENT
Start: 2024-06-07 | End: 2024-06-07

## 2024-06-07 RX ADMIN — PROPOFOL 50 MCG/KG/MIN: 10 INJECTION, EMULSION INTRAVENOUS at 14:41

## 2024-06-07 RX ADMIN — ALPRAZOLAM 0.5 MG: 0.5 TABLET ORAL at 20:54

## 2024-06-07 RX ADMIN — VANCOMYCIN HYDROCHLORIDE 750 MG: 750 INJECTION, SOLUTION INTRAVENOUS at 21:58

## 2024-06-07 RX ADMIN — PROPOFOL 50 MG: 10 INJECTION, EMULSION INTRAVENOUS at 14:41

## 2024-06-07 RX ADMIN — SODIUM CHLORIDE, SODIUM LACTATE, POTASSIUM CHLORIDE, AND CALCIUM CHLORIDE: .6; .31; .03; .02 INJECTION, SOLUTION INTRAVENOUS at 14:34

## 2024-06-07 RX ADMIN — FOLIC ACID 1 MG: 1 TABLET ORAL at 08:45

## 2024-06-07 RX ADMIN — OXYCODONE HYDROCHLORIDE 5 MG: 5 TABLET ORAL at 21:25

## 2024-06-07 RX ADMIN — OXYCODONE HYDROCHLORIDE 10 MG: 10 TABLET ORAL at 17:18

## 2024-06-07 RX ADMIN — FENTANYL CITRATE 50 MCG: 50 INJECTION, SOLUTION INTRAMUSCULAR; INTRAVENOUS at 14:43

## 2024-06-07 RX ADMIN — Medication 100 MG: at 08:46

## 2024-06-07 RX ADMIN — LACTULOSE 20 G: 20 SOLUTION ORAL at 08:45

## 2024-06-07 RX ADMIN — VANCOMYCIN HYDROCHLORIDE 750 MG: 750 INJECTION, SOLUTION INTRAVENOUS at 08:46

## 2024-06-07 RX ADMIN — PANTOPRAZOLE SODIUM 40 MG: 40 INJECTION, POWDER, FOR SOLUTION INTRAVENOUS at 20:53

## 2024-06-07 RX ADMIN — Medication 20 MG: at 14:49

## 2024-06-07 RX ADMIN — ALPRAZOLAM 0.5 MG: 0.5 TABLET ORAL at 08:45

## 2024-06-07 RX ADMIN — PANTOPRAZOLE SODIUM 40 MG: 40 INJECTION, POWDER, FOR SOLUTION INTRAVENOUS at 08:46

## 2024-06-07 RX ADMIN — CEFTRIAXONE 1000 MG: 1 INJECTION, SOLUTION INTRAVENOUS at 20:53

## 2024-06-07 RX ADMIN — FUROSEMIDE 20 MG: 20 TABLET ORAL at 08:46

## 2024-06-07 RX ADMIN — GABAPENTIN 300 MG: 300 CAPSULE ORAL at 20:54

## 2024-06-07 RX ADMIN — FLUTICASONE FUROATE AND VILANTEROL TRIFENATATE 1 PUFF: 100; 25 POWDER RESPIRATORY (INHALATION) at 08:56

## 2024-06-07 RX ADMIN — LIDOCAINE HYDROCHLORIDE 50 MG: 10 INJECTION, SOLUTION EPIDURAL; INFILTRATION; INTRACAUDAL; PERINEURAL at 14:40

## 2024-06-07 RX ADMIN — RIFAXIMIN 550 MG: 550 TABLET ORAL at 13:10

## 2024-06-07 RX ADMIN — SPIRONOLACTONE 50 MG: 25 TABLET ORAL at 08:45

## 2024-06-07 RX ADMIN — METOPROLOL SUCCINATE 12.5 MG: 25 TABLET, FILM COATED, EXTENDED RELEASE ORAL at 08:46

## 2024-06-07 RX ADMIN — RIFAXIMIN 550 MG: 550 TABLET ORAL at 23:19

## 2024-06-07 NOTE — PROGRESS NOTES
Progress Note - Podiatry  Finn Oleary 68 y.o. female MRN: [id number]  Unit/Bed#: -01 Encounter: [id number]    Assessment:  Carl grade 3 ulceration right great toe with cellulitis and osteomyelitis of distal tuft distal phalanx noted on MRI  Alcoholic peripheral neuropathy  Metabolic encephalopathy, alcohol sclerosis, thrombocytopenia, elevated TSH, alcohol abuse, generalized anxiety disorder, tobacco use disorder    Plan:  Patient seen at bedside in preop holding area, surgical plan of partial left great toe amputation disarticulation at [facility] was reviewed with patient, risk, benefits and alternatives were discussed, all questions were answered, surgical site was marked and verified, written consent was obtained.  NPO status was verified.  I personally viewed patient's medical records, x-ray, MRI right foot, hospitalist note, infectious disease note.  Postop patient to maintain nonweightbearing to the right foot.    Continue IV antibiotics per infectious disease.  Remainder of care per primary team.      Subjective/Objective   Chief Complaint:   Chief Complaint   Patient presents with    Altered Mental Status     Pt reports to er via ems for altered mental status, pt reports issues with her feet, pt reports toe on l;eft foot was amputated and she has issue with big toe on right foot, ems reports pt is inconsistent with her meds and she is being treated for cirosis        Subjective: Patient seen for osteomyelitis of right great toe, she states she is starting to feel better, no new complaints.    Blood pressure 125/61, pulse 61, temperature (!) 96.8 °F (36 °C), temperature source Temporal, resp. rate 16, weight 55.6 kg (122 lb 9.6 oz), SpO2 91%, not currently breastfeeding.,Body mass index is 21.72 kg/m².    Invasive Devices       Peripheral Intravenous Line  Duration             Peripheral IV 06/05/24 Left;Ventral (anterior) Forearm 1 day    Peripheral IV 06/06/24 Dorsal (posterior);Left Hand <1 day                     Physical Exam:   General appearance: alert, cooperative and no distress  Dorsalis pedis and posterior tibial pulses are palpable bilaterally.  Right foot with decreased edema, erythema, dressing is dry clean and intact, no purulence or malodor is noted.  Left foot is within normal limits with prior partial first and second toe amputations.              Lab, Imaging and other studies:   Admission on 06/05/2024   Component Date Value    Ventricular Rate 06/05/2024 61     Atrial Rate 06/05/2024 61     VA Interval 06/05/2024 148     QRSD Interval 06/05/2024 66     QT Interval 06/05/2024 414     QTC Interval 06/05/2024 416     P Axis 06/05/2024 90     QRS Rainbow 06/05/2024 83     T Wave Axis 06/05/2024 79     WBC 06/05/2024 4.74     RBC 06/05/2024 3.98     Hemoglobin 06/05/2024 13.3     Hematocrit 06/05/2024 39.6     MCV 06/05/2024 100 (H)     MCH 06/05/2024 33.4     MCHC 06/05/2024 33.6     RDW 06/05/2024 12.4     MPV 06/05/2024 10.4     Platelets 06/05/2024 119 (L)     nRBC 06/05/2024 0     Segmented % 06/05/2024 29 (L)     Immature Grans % 06/05/2024 0     Lymphocytes % 06/05/2024 58 (H)     Monocytes % 06/05/2024 9     Eosinophils Relative 06/05/2024 3     Basophils Relative 06/05/2024 1     Absolute Neutrophils 06/05/2024 1.38 (L)     Absolute Immature Grans 06/05/2024 0.01     Absolute Lymphocytes 06/05/2024 2.74     Absolute Monocytes 06/05/2024 0.41     Eosinophils Absolute 06/05/2024 0.15     Basophils Absolute 06/05/2024 0.05     Sodium 06/05/2024 136     Potassium 06/05/2024 3.9     Chloride 06/05/2024 96     CO2 06/05/2024 34 (H)     ANION GAP 06/05/2024 6     BUN 06/05/2024 6     Creatinine 06/05/2024 0.67     Glucose 06/05/2024 81     Calcium 06/05/2024 9.7     AST 06/05/2024 35     ALT 06/05/2024 12     Alkaline Phosphatase 06/05/2024 95     Total Protein 06/05/2024 6.5     Albumin 06/05/2024 3.5     Total Bilirubin 06/05/2024 1.65 (H)     eGFR 06/05/2024 89     LACTIC ACID 06/05/2024 1.7      Procalcitonin 06/05/2024 <0.05     Protime 06/05/2024 15.8 (H)     INR 06/05/2024 1.21 (H)     PTT 06/05/2024 35     Gram Stain Result 06/05/2024 Gram positive cocci in clusters (A)     Blood Culture 06/05/2024 No Growth at 24 hrs.     Color, UA 06/05/2024 Yellow     Clarity, UA 06/05/2024 Clear     Specific Gravity, UA 06/05/2024 <1.005 (L)     pH, UA 06/05/2024 7.0     Leukocytes, UA 06/05/2024 Negative     Nitrite, UA 06/05/2024 Negative     Protein, UA 06/05/2024 Negative     Glucose, UA 06/05/2024 Negative     Ketones, UA 06/05/2024 Negative     Urobilinogen, UA 06/05/2024 <2.0     Bilirubin, UA 06/05/2024 Negative     Occult Blood, UA 06/05/2024 Negative     hs TnI 0hr 06/05/2024 4     Total CK 06/05/2024 79     Sed Rate 06/05/2024 2     CRP 06/05/2024 4.7 (H)     Ammonia 06/05/2024 80 (H)     TSH 3RD GENERATON 06/05/2024 5.782 (H)     Ethanol Lvl 06/05/2024 130 (H)     Salicylate Lvl 06/05/2024 <5     Acetaminophen Level 06/05/2024 <2 (L)     BNP 06/05/2024 454 (H)     hs TnI 2hr 06/06/2024 4     Delta 2hr hsTnI 06/06/2024 0     Free T4 06/05/2024 0.96     Sodium 06/06/2024 140     Potassium 06/06/2024 3.9     Chloride 06/06/2024 102     CO2 06/06/2024 35 (H)     ANION GAP 06/06/2024 3 (L)     BUN 06/06/2024 5     Creatinine 06/06/2024 0.60     Glucose 06/06/2024 76     Calcium 06/06/2024 9.2     eGFR 06/06/2024 93     WBC 06/06/2024 3.62 (L)     RBC 06/06/2024 3.76 (L)     Hemoglobin 06/06/2024 12.4     Hematocrit 06/06/2024 37.4     MCV 06/06/2024 100 (H)     MCH 06/06/2024 33.0     MCHC 06/06/2024 33.2     RDW 06/06/2024 12.4     Platelets 06/06/2024 102 (L)     MPV 06/06/2024 10.3     Magnesium 06/06/2024 1.3 (L)     Phosphorus 06/06/2024 3.0     Protime 06/06/2024 17.2 (H)     INR 06/06/2024 1.36 (H)     PTT 06/06/2024 33     WBC 06/07/2024 3.51 (L)     RBC 06/07/2024 3.70 (L)     Hemoglobin 06/07/2024 12.3     Hematocrit 06/07/2024 36.5     MCV 06/07/2024 99 (H)     MCH 06/07/2024 33.2     MCHC  06/07/2024 33.7     RDW 06/07/2024 12.1     MPV 06/07/2024 10.9     Platelets 06/07/2024 96 (L)     nRBC 06/07/2024 0     Segmented % 06/07/2024 39 (L)     Immature Grans % 06/07/2024 1     Lymphocytes % 06/07/2024 48 (H)     Monocytes % 06/07/2024 9     Eosinophils Relative 06/07/2024 2     Basophils Relative 06/07/2024 1     Absolute Neutrophils 06/07/2024 1.38 (L)     Absolute Immature Grans 06/07/2024 0.02     Absolute Lymphocytes 06/07/2024 1.68     Absolute Monocytes 06/07/2024 0.32     Eosinophils Absolute 06/07/2024 0.08     Basophils Absolute 06/07/2024 0.03     Sodium 06/07/2024 137     Potassium 06/07/2024 3.7     Chloride 06/07/2024 103     CO2 06/07/2024 33 (H)     ANION GAP 06/07/2024 1 (L)     BUN 06/07/2024 9     Creatinine 06/07/2024 0.78     Glucose 06/07/2024 86     Calcium 06/07/2024 8.8     Corrected Calcium 06/07/2024 9.9     AST 06/07/2024 26     ALT 06/07/2024 9     Alkaline Phosphatase 06/07/2024 77     Total Protein 06/07/2024 5.0 (L)     Albumin 06/07/2024 2.6 (L)     Total Bilirubin 06/07/2024 1.25 (H)     eGFR 06/07/2024 77     Magnesium 06/07/2024 1.6 (L)     Staphylococcus 06/05/2024 Detected (A)     INTERNAL QC RESULT 06/05/2024 Valid      Imaging: I have personally reviewed pertinent films in PACS  EKG, Pathology, and Other Studies: I have personally reviewed pertinent reports.  VTE Pharmacologic Prophylaxis: Reason for no pharmacologic prophylaxis held for OR  VTE Mechanical Prophylaxis: sequential compression device

## 2024-06-07 NOTE — QUICK NOTE
MRI showing osteomyelitis. Podiatry planning for OR Friday 6/7.       3920 addendum: 1/2 blood cultures growing gram-positive cocci in clusters.  Patient already on vancomycin.  ID already consulted.  If second blood cultures return positive would obtain echo.

## 2024-06-07 NOTE — ASSESSMENT & PLAN NOTE
Endorses right great toe pain and swelling for at least the last few days, however she is unclear if true time of onset  Deep ulcer noted to right great toe, concern for osteomyelitis-some questionable cortical disruption on x-ray  CRP 4.7, ESR 2  Continue ceftriaxone and vancomycin for now-avoided cefepime due to concurrent hepatic encephalopathy  MRI right foot consistent with osteomyelitis of the tuft of first distal phalanx  Podiatry consulted - OR anticipated 6/7  PT/OT to evaluate postoperatively

## 2024-06-07 NOTE — PHYSICAL THERAPY NOTE
PHYSICAL THERAPY ORTHOTIC FITTING NOTE      Patient Name: Finn Oleary  Today's Date: 6/7/2024 06/07/24 1101   PT Last Visit   PT Visit Date 06/07/24   Note Type   Note type Orthotic Management/Training   Additional Comments PT eval order received. Today pt was fit with DARCO wedge shoe. Please re-consult PT post op for ambulation and mobility assessment.   Type of Brace   Brace Applied Darco Wedge Shoe (Toe Unloading)  (size small)   Bracing Recommendations Recommendation (comment)  (when out of bed and ambulating, heel weight bearing only)   Education   Education Provided Yes - Pt received supine in bed and educated re: order for DARCO shoe to allow for offloading of toes and heel weight bearing as entered by Podiatry MD. Shoe was sized and fit to pt. Pt was provided with education that required increased time and multiple re-attempts to educate pt on wear, benefit of shoe, impact on ambulation/ balance/ gait quality, weight bearing precautions. Pt verbalized understanding but anxious about going to the OR today. Emphasized that PT would return post op for mobility trials and continued education using the DARCO shoe.    End of Consult   Patient Position at End of Consult Supine;All needs within reach   Nurse Communication Nurse aware of consult, application of brace  (Gisell ESPINOSA)     Jamshid Roe, PT

## 2024-06-07 NOTE — OP NOTE
OPERATIVE REPORT  PATIENT NAME: Finn Oleary    :  1955  MRN: [id number]  Pt Location:  OR ROOM 02    SURGERY DATE: 2024    Surgeons and Role:     * Niru Baldwin DPM - Primary    No qualified residents were available to assist    Preop Diagnosis:  Osteomyelitis of toe (HCC) [M86.9]    Post-Op Diagnosis Codes:     * Osteomyelitis of toe (HCC) [M86.9]    Procedure(s):  Right - AMPUTATION RIGHT GREAT TOE    Specimen(s):  ID Type Source Tests Collected by Time Destination   1 : right great toe Tissue Toe, Right TISSUE EXAM Niru Baldwin DPM 2024 1455        Estimated Blood Loss:   Minimal    Hemostasis  Direct pressure  Atraumatic technique    Materials:  4-0 nylon    IntraOp injectables: None    Drains:  * No LDAs found *    Anesthesia Type:   IV Sedation with Anesthesia  Local anesthesia with a one-to-one mixture of 10 cc of 1% plain lidocaine, 0.5% plain bupivacaine    Operative Indications:  Osteomyelitis of toe (HCC) [M86.9]      Operative Findings:  Ulceration distal tip right great toe which probes to bone  Adequate bleeding noted to tissues  Primary closure achieved      Complications:   None    Procedure and Technique:  Under mild sedation, the patient was brought into the operating room and placed on the operating table in supine position.  IV sedation was achieved by anesthesia team and a universal timeout was performed where all parties are in agreement of correct patient, correct procedure and correct surgical site.  A digital block was performed consisting of 10 cc of a one-to-one mixture of 1% plain lidocaine and 0.5% plain Marcaine injected to first MTPJ ring block.  The foot was then scrubbed, prepped and draped in the usual aseptic manner.    Attention was then directed to the right great toe interphalangeal joint where a fishmouth type incision was made, utilizing a sterile 15 blade the incision was carried deep straight to bone, soft tissue structures were then reflected off  the proximal phalanx.  The distal toe was then disarticulated at the level of the IPJ.  This thyroid digit was then passed off to the back table.  It was noted that all tissue margins were bleeding and viable.  No deep sinus tracts or areas of purulence were visualized.  The remaining bone of the proximal phalanx was noted to be of hard and viable quality, and the remaining tendon structures were identified and severed approximately to remove any nidus of infection.    The surgical incision was irrigated with copious amounts of normal sterile saline.  Skin edges were reapproximated and closed using 4-0 nylon in simple interrupted suture technique.  The foot was then cleansed and dried.  The incision site was dressed with Adaptic covered with 4 x 4's, Kerlix and Ace wrap.    The patient tolerated the procedure and anesthesia well without immediate complications and transferred to PACU with vital signs stable and vascular status intact to the remaining toes right foot.    As with many limb salvage procedures, we constantly the possibility of performing further stages to this procedure.  Procedures may include debridements, delayed closure, plastic surgery techniques or more proximal amputation.  This procedure may be considered part of a multistage limb salvage treatment plan.       I was present for the entire procedure.    Patient Disposition:  PACU         SIGNATURE: Niru Baldwin DPM  DATE: June 7, 2024  TIME: 3:20 PM

## 2024-06-07 NOTE — PLAN OF CARE
Problem: Potential for Falls  Goal: Patient will remain free of falls  Description: INTERVENTIONS:  - Educate patient/family on patient safety including physical limitations  - Instruct patient to call for assistance with activity   - Consult OT/PT to assist with strengthening/mobility   - Keep Call bell within reach  - Keep bed low and locked with side rails adjusted as appropriate  - Keep care items and personal belongings within reach  - Initiate and maintain comfort rounds  - Make Fall Risk Sign visible to staff  - Offer Toileting every x Hours, in advance of need  - Initiate/Maintain xalarm  - Obtain necessary fall risk management equipment: x  - Apply yellow socks and bracelet for high fall risk patients  - Consider moving patient to room near nurses station  Outcome: Progressing     Problem: PAIN - ADULT  Goal: Verbalizes/displays adequate comfort level or baseline comfort level  Description: Interventions:  - Encourage patient to monitor pain and request assistance  - Assess pain using appropriate pain scale  - Administer analgesics based on type and severity of pain and evaluate response  - Implement non-pharmacological measures as appropriate and evaluate response  - Consider cultural and social influences on pain and pain management  - Notify physician/advanced practitioner if interventions unsuccessful or patient reports new pain  Outcome: Progressing     Problem: INFECTION - ADULT  Goal: Absence or prevention of progression during hospitalization  Description: INTERVENTIONS:  - Assess and monitor for signs and symptoms of infection  - Monitor lab/diagnostic results  - Monitor all insertion sites, i.e. indwelling lines, tubes, and drains  - Monitor endotracheal if appropriate and nasal secretions for changes in amount and color  - [place] appropriate cooling/warming therapies per order  - Administer medications as ordered  - Instruct and encourage patient and family to use good hand hygiene  technique  - Identify and instruct in appropriate isolation precautions for identified infection/condition  Outcome: Progressing  Goal: Absence of fever/infection during neutropenic period  Description: INTERVENTIONS:  - Monitor WBC    Outcome: Progressing     Problem: SAFETY ADULT  Goal: Patient will remain free of falls  Description: INTERVENTIONS:  - Educate patient/family on patient safety including physical limitations  - Instruct patient to call for assistance with activity   - Consult OT/PT to assist with strengthening/mobility   - Keep Call bell within reach  - Keep bed low and locked with side rails adjusted as appropriate  - Keep care items and personal belongings within reach  - Initiate and maintain comfort rounds  - Make Fall Risk Sign visible to staff  - Offer Toileting every x Hours, in advance of need  - Initiate/Maintain xalarm  - Obtain necessary fall risk management equipment: x  - Apply yellow socks and bracelet for high fall risk patients  - Consider moving patient to room near nurses station  Outcome: Progressing  Goal: Maintain or return to baseline ADL function  Description: INTERVENTIONS:  -  Assess patient's ability to carry out ADLs; assess patient's baseline for ADL function and identify physical deficits which impact ability to perform ADLs (bathing, care of mouth/teeth, toileting, grooming, dressing, etc.)  - Assess/evaluate cause of self-care deficits   - Assess range of motion  - Assess patient's mobility; develop plan if impaired  - Assess patient's need for assistive devices and provide as appropriate  - Encourage maximum independence but intervene and supervise when necessary  - Involve family in performance of ADLs  - Assess for home care needs following discharge   - Consider OT consult to assist with ADL evaluation and planning for discharge  - Provide patient education as appropriate  Outcome: Progressing  Goal: Maintains/Returns to pre admission functional level  Description:  INTERVENTIONS:  - Perform AM-PAC 6 Click Basic Mobility/ Daily Activity assessment daily.  - Set and communicate daily mobility goal to care team and patient/family/caregiver.   - Collaborate with rehabilitation services on mobility goals if consulted  - Perform Range of Motion x times a day.  - Reposition patient every x hours.  - Dangle patient x times a day  - Stand patient x times a day  - Ambulate patient x times a day  - Out of bed to chair x times a day   - Out of bed for meals x times a day  - Out of bed for toileting  - Record patient progress and toleration of activity level   Outcome: Progressing     Problem: DISCHARGE PLANNING  Goal: Discharge to home or other facility with appropriate resources  Description: INTERVENTIONS:  - Identify barriers to discharge w/patient and caregiver  - Arrange for needed discharge resources and transportation as appropriate  - Identify discharge learning needs (meds, wound care, etc.)  - Arrange for interpretive services to assist at discharge as needed  - Refer to Case Management Department for coordinating discharge planning if the patient needs post-hospital services based on physician/advanced practitioner order or complex needs related to functional status, cognitive ability, or social support system  Outcome: Progressing     Problem: Knowledge Deficit  Goal: Patient/family/caregiver demonstrates understanding of disease process, treatment plan, medications, and discharge instructions  Description: Complete learning assessment and assess knowledge base.  Interventions:  - Provide teaching at level of understanding  - Provide teaching via preferred learning methods  Outcome: Progressing     Problem: Prexisting or High Potential for Compromised Skin Integrity  Goal: Skin integrity is maintained or improved  Description: INTERVENTIONS:  - Identify patients at risk for skin breakdown  - Assess and monitor skin integrity  - Assess and monitor nutrition and hydration  status  - Monitor labs   - Assess for incontinence   - Turn and reposition patient  - Assist with mobility/ambulation  - Relieve pressure over bony prominences  - Avoid friction and shearing  - Provide appropriate hygiene as needed including keeping skin clean and dry  - Evaluate need for skin moisturizer/barrier cream  - Collaborate with interdisciplinary team   - Patient/family teaching  - Consider wound care consult   Outcome: Progressing

## 2024-06-07 NOTE — ASSESSMENT & PLAN NOTE
1 out of 2 blood culture results 6/7 revealing gram-positive cocci in clusters  Repeat cultures pending  Currently on IV Rocephin and vancomycin, continue

## 2024-06-07 NOTE — PLAN OF CARE
Problem: PAIN - ADULT  Goal: Verbalizes/displays adequate comfort level or baseline comfort level  Description: Interventions:  - Encourage patient to monitor pain and request assistance  - Assess pain using appropriate pain scale  - Administer analgesics based on type and severity of pain and evaluate response  - Implement non-pharmacological measures as appropriate and evaluate response  - Consider cultural and social influences on pain and pain management  - Notify physician/advanced practitioner if interventions unsuccessful or patient reports new pain  Outcome: Progressing     Problem: INFECTION - ADULT  Goal: Absence or prevention of progression during hospitalization  Description: INTERVENTIONS:  - Assess and monitor for signs and symptoms of infection  - Monitor lab/diagnostic results  - Monitor all insertion sites, i.e. indwelling lines, tubes, and drains  - Monitor endotracheal if appropriate and nasal secretions for changes in amount and color  - [place] appropriate cooling/warming therapies per order  - Administer medications as ordered  - Instruct and encourage patient and family to use good hand hygiene technique  - Identify and instruct in appropriate isolation precautions for identified infection/condition  Outcome: Progressing  Goal: Absence of fever/infection during neutropenic period  Description: INTERVENTIONS:  - Monitor WBC    Outcome: Progressing  Goal: Maintain or return to baseline ADL function  Description: INTERVENTIONS:  -  Assess patient's ability to carry out ADLs; assess patient's baseline for ADL function and identify physical deficits which impact ability to perform ADLs (bathing, care of mouth/teeth, toileting, grooming, dressing, etc.)  - Assess/evaluate cause of self-care deficits   - Assess range of motion  - Assess patient's mobility; develop plan if impaired  - Assess patient's need for assistive devices and provide as appropriate  - Encourage maximum independence but  intervene and supervise when necessary  - Involve family in performance of ADLs  - Assess for home care needs following discharge   - Consider OT consult to assist with ADL evaluation and planning for discharge  - Provide patient education as appropriate  Outcome: Progressing     Problem: DISCHARGE PLANNING  Goal: Discharge to home or other facility with appropriate resources  Description: INTERVENTIONS:  - Identify barriers to discharge w/patient and caregiver  - Arrange for needed discharge resources and transportation as appropriate  - Identify discharge learning needs (meds, wound care, etc.)  - Arrange for interpretive services to assist at discharge as needed  - Refer to Case Management Department for coordinating discharge planning if the patient needs post-hospital services based on physician/advanced practitioner order or complex needs related to functional status, cognitive ability, or social support system  Outcome: Progressing     Problem: Knowledge Deficit  Goal: Patient/family/caregiver demonstrates understanding of disease process, treatment plan, medications, and discharge instructions  Description: Complete learning assessment and assess knowledge base.  Interventions:  - Provide teaching at level of understanding  - Provide teaching via preferred learning methods  Outcome: Progressing     Problem: Prexisting or High Potential for Compromised Skin Integrity  Goal: Skin integrity is maintained or improved  Description: INTERVENTIONS:  - Identify patients at risk for skin breakdown  - Assess and monitor skin integrity  - Assess and monitor nutrition and hydration status  - Monitor labs   - Assess for incontinence   - Turn and reposition patient  - Assist with mobility/ambulation  - Relieve pressure over bony prominences  - Avoid friction and shearing  - Provide appropriate hygiene as needed including keeping skin clean and dry  - Evaluate need for skin moisturizer/barrier cream  - Collaborate with  interdisciplinary team   - Patient/family teaching  - Consider wound care consult   Outcome: Progressing

## 2024-06-07 NOTE — PROGRESS NOTES
Finn Oleary is a 68 y.o. female who is currently ordered Vancomycin IV with management by the Pharmacy Consult service.  Relevant clinical data and objective / subjective history reviewed.  Vancomycin Assessment:  Indication and Goal AUC/Trough: Bone/joint infection (goal -600, trough >10), -600, trough >10  Clinical Status: stable  Micro:     Renal Function:  SCr: 0.78 mg/dL  CrCl: 55.8 mL/min  Renal replacement: Not on dialysis  Days of Therapy: 2  Current Dose: 750mg IV Q12H  Vancomycin Plan:  New Dosing: No change  Estimated AUC: 578 mcg*hr/mL  Estimated Trough: 17 mcg/mL  Next Level: With AM labs at 0600 on 6/8/24  Renal Function Monitoring: Daily BMP and UOP  Pharmacy will continue to follow closely for s/sx of nephrotoxicity, infusion reactions and appropriateness of therapy.  BMP and CBC will be ordered per protocol. We will continue to follow the patient’s culture results and clinical progress daily.    Azael Osuna, Pharmacist

## 2024-06-07 NOTE — PROGRESS NOTES
"Progress note - [facility]   Finn Oleary 68 y.o. female MRN: [id number]  Unit/Bed#: -01 Encounter: [id number]    ASSESSMENT and PLAN  1.  Decompensated EtOH cirrhosis  68F with hx/o decompensated EtOH cirrhosis with known hx/o esophageal varices, hepatic encephalopathy with current EtOH use who presented to the ED from home on 6/5/2024 for toe pain, concerning for osteomyelitis. Questionable adherence to her home medications including lactulose. Last seen by GI in 2019 and had EGD at that time with portal hepatic gastropathy, small varices. Last drink reported 6/5. Admission MELD 3.0 = 12.    - 6/7: MELD 3.0 = 13, driven by INR. US abdomen: \"cirrhosis, cholelithiasis with negative sonographic Sanchez's sign. Cholecystitis is unlikely.\" No ascites  - NPO/sips awaiting RUQ US; OK to resume house diet with 2 g Na restriction  - Avoid NSAIDs, continue pantoprazole 40 mg QD  - Trend MELD labs daily (CBC, CMP, INR)  - Recommend complete EtOH cessation - ongoing EtOH use precludes her from transplant consideration at this time  - Follow up outpatient with hepatology    Ascites  - None noted on US abdomen 6/7  - Continue home diuretic regimen of furosemide 20 mg QD, spironolactone 50 mg QD  - Paracentesis as needed if ascites develops    Esophageal varices  - Small varices noted on EGD 2019, 5 yr recall recommended  - Recommend outpatient EGD for variceal screening on discharge    Hepatic encephalopathy  - Aox4 on exam 6/7 with no evidence of asterixis  - Continue lactulose 20 g TID, START rifaximin 550 mg BID  - Monitor neuro exam daily for mental status, asterxis    Liver lesion  - None noted to date on most recent abdominal imaging: US 6/7  - Recommend Q6 mos surveillance with US/MRI    2.  EtOH use disorder  - Patient endorses drinking 1 flavored beer \"not every day\" but \"only when I have pain\"  - Recommend immediate cessation, patient is pre-contemplative at this time  - Continue " thiamine/folic acid supplementation  - Monitor for s/sx of EtOH withdrawal    3.  Thrombocytopenia  - Platelets 119 on admission, 2/2 cirrhosis  - Trend CBC/platelets    4.  Constipation  - Patient moving her bowels daily as of 6/6  - Noncompliant with lactulose @ home, refusing some doses of lactulose  - Continue lactulose 20 mg TID, titrated to 3-4 BM per day    5.  Osteomyelitis of great toe of right foot  - Podiatry, ID following  - Abx: ceftriaxone day #2, vancomycin day #2    SUBJECTIVE  Accompanied by self and history is obtained from self.    No acute GI complaints today. Denies abdominal pain, N/V. Per nursing, refused 3rd dose of lactulose last night. Did take this AM. Is moving her bowels 3-4x/day.      Review of Systems   Constitutional:  Negative for appetite change, chills, fever and unexpected weight change.   HENT:  Negative for sore throat, trouble swallowing and voice change.    Respiratory:  Negative for cough and choking.    Cardiovascular:  Negative for chest pain and leg swelling.   Gastrointestinal:  Negative for abdominal distention, abdominal pain, anal bleeding, blood in stool, constipation, diarrhea, nausea, rectal pain and vomiting.   Skin:  Negative for pallor and rash.   Psychiatric/Behavioral:  Negative for confusion and sleep disturbance. The patient is not nervous/anxious.        Temp:  [96.6 °F (35.9 °C)-96.8 °F (36 °C)] 96.8 °F (36 °C)  HR:  [58-78] 61  Resp:  [16] 16  BP: ()/() 125/61     PHYSICAL EXAM  Physical Exam  Vitals and nursing note reviewed.   Constitutional:       General: She is not in acute distress.     Appearance: She is ill-appearing (chronically). She is not toxic-appearing.   HENT:      Head: Normocephalic.      Mouth/Throat:      Mouth: Mucous membranes are moist.      Pharynx: Oropharynx is clear.   Eyes:      General: No scleral icterus.     Extraocular Movements: Extraocular movements intact.   Neck:      Trachea: Phonation normal.    Cardiovascular:      Comments: Appears well perfused  Pulmonary:      Effort: Pulmonary effort is normal. No respiratory distress.   Abdominal:      General: Bowel sounds are normal. There is no distension or abdominal bruit.      Palpations: Abdomen is soft. There is no hepatomegaly or splenomegaly.      Tenderness: There is no abdominal tenderness. There is no guarding or rebound.   Musculoskeletal:      Cervical back: Neck supple.      Right lower leg: Edema (trace) present.      Left lower leg: Edema present.      Comments: Moving all 4 extremities spontaneously   Skin:     General: Skin is warm and dry.      Capillary Refill: Capillary refill takes less than 2 seconds.      Coloration: Skin is jaundiced. Skin is not pale.   Neurological:      General: No focal deficit present.      Mental Status: She is alert and oriented to person, place, and time.      Comments: No asterixis   Psychiatric:         Behavior: Behavior normal. Behavior is cooperative.         Thought Content: Thought content normal.           LABS & STUDIES  Lab Results   Component Value Date    CALCIUM 8.8 06/07/2024    K 3.7 06/07/2024    CO2 33 (H) 06/07/2024     06/07/2024    BUN 9 06/07/2024    CREATININE 0.78 06/07/2024    CREATININE 0.60 06/06/2024    CREATININE 0.67 06/05/2024     Lab Results   Component Value Date    WBC 3.51 (L) 06/07/2024    WBC 3.62 (L) 06/06/2024    WBC 4.74 06/05/2024    HGB 12.3 06/07/2024    HGB 12.4 06/06/2024    HGB 13.3 06/05/2024    MCV 99 (H) 06/07/2024    PLT 96 (L) 06/07/2024     (L) 06/06/2024     (L) 06/05/2024     Lab Results   Component Value Date    ALT 9 06/07/2024    ALT 12 06/05/2024    ALT 26 10/09/2019    AST 26 06/07/2024    AST 35 06/05/2024    AST 56 (H) 10/09/2019    ALKPHOS 77 06/07/2024    ALKPHOS 95 06/05/2024    ALKPHOS 257 (H) 06/07/2019    TBILI 1.25 (H) 06/07/2024    TBILI 1.65 (H) 06/05/2024    TBILI 2.2 (H) 10/09/2019     Lab Results   Component Value Date     INR 1.36 (H) 06/06/2024    INR 1.21 (H) 06/05/2024    INR 1.4 (H) 10/09/2019       US right upper quadrant    Result Date: 6/7/2024  Narrative: RIGHT UPPER QUADRANT ULTRASOUND INDICATION: RUQ/Epigastric pain, cirrhosis. COMPARISON: None TECHNIQUE: Real-time ultrasound of the right upper quadrant was performed with a curvilinear transducer with both volumetric sweeps and still imaging techniques. FINDINGS: PANCREAS: Visualized portions of the pancreas are within normal limits. The tail is not well visualized. AORTA AND IVC: Visualized portions are normal for patient age. LIVER: Size: Within normal range. The liver measures 16.5 cm in the midclavicular line. Contour: Surface contour is nodular. Parenchyma: Echogenicity and echotexture are within normal limits. No liver mass identified. Limited imaging of the main portal vein shows it to be patent and hepatopetal. BILIARY: Cholelithiasis with negative sonographic Sanchez's sign. Nonspecific wall thickening. No intrahepatic biliary dilatation. CBD measures 8.0 mm. No choledocholithiasis. KIDNEY: Right kidney measures 11.2 x 5.1 x 3.8 cm. Volume 112.3 mL Kidney within normal limits. ASCITES: None.     Impression: Nodular hepatic contours in keeping with cirrhosis. Cholelithiasis with negative sonographic Sanchez's sign. Cholecystitis is unlikely. Workstation performed: [id number]     XR foot 3+ views RIGHT    Result Date: 6/6/2024  Narrative: XR FOOT 3+ VW RIGHT INDICATION: Right foot infection. COMPARISON: None FINDINGS: No acute fracture or dislocation. No significant degenerative changes. Diminished cortical conspicuity of the distal tuft first phalanx, suspicious for osteomyelitis. This is best seen on the lateral view. Soft tissue ulceration tip of the first toe.     Impression: Diminished cortical conspicuity distal tuft first phalanx with overlying soft tissue ulceration, findings suspicious for osteomyelitis. Workstation performed: [id number]     XR chest portable  - 1 view    Result Date: 6/6/2024  Narrative: XR CHEST PORTABLE INDICATION: Sepsis. COMPARISON: None FINDINGS: Monitoring leads and clips project over the chest. Clear lungs. No pneumothorax or pleural effusion. Normal cardiomediastinal silhouette. Bones are unremarkable for age. Normal upper abdomen.     Impression: No acute cardiopulmonary disease. Workstation performed: [id number]     MRI foot/forefoot toest right wo contrast    Result Date: 6/6/2024  Narrative: MRI FOOT/FOREFOOT TOES RIGHT WO CONTRAST INDICATION:   Concern for osteomyelitis right great toe. COMPARISON: Radiograph 6/5/2024 TECHNIQUE:  Multiplanar/multisequence MR of the right foot was performed. FINDINGS: SUBCUTANEOUS TISSUES: Ulceration of the first distal phalanx. Nonspecific dorsal forefoot subcutaneous edema. No evidence of fluid collection. BONES: There is confluently hypointense T1 weighted marrow, considered definite osteomyelitis, extending for 0.5 cm proximal from the tip of the first distal phalangeal. Less specific marrow edema on T2 weighted sequences extend for 0.5 cm. FIRST MTP JOINT:  Intact. SESAMOID BONES:  Intact. OTHER ARTICULAR SURFACE:  Normal. PLANTAR FASCIA:  Intact. LISFRANC LIGAMENT:  Intact. FOREFOOT TENDONS: Intact. INTERMETATARSAL REGIONS: No bursitis or Jnasen's neuroma. MUSCULATURE: Intact.     Impression: Findings consistent with osteomyelitis of the tuft of the first distal phalanx. Workstation performed: [id number]     CT head without contrast    Result Date: 6/5/2024  Narrative: CT BRAIN - WITHOUT CONTRAST INDICATION:   ams. COMPARISON:  None. TECHNIQUE:  CT examination of the brain was performed.  Multiplanar 2D reformatted images were created from the source data. Radiation dose length product (DLP) for this visit:  769.38 mGy-cm .  This examination, like all CT scans performed in the [facility], was performed utilizing techniques to minimize radiation dose exposure, including the use of  iterative  reconstruction and automated exposure control. IMAGE QUALITY:  Diagnostic. FINDINGS: PARENCHYMA: Decreased attenuation is noted in periventricular and subcortical white matter demonstrating an appearance that is statistically most likely to represent mild microangiopathic change. 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  No intracranial mass, mass effect or midline shift.  No acute parenchymal hemorrhage. VENTRICLES AND EXTRA-AXIAL SPACES:  Normal for the patient's age. VISUALIZED ORBITS: Normal visualized orbits. PARANASAL SINUSES: Normal visualized paranasal sinuses. CALVARIUM AND EXTRACRANIAL SOFT TISSUES:  Normal.     Impression: 1.  No acute intracranial abnormality.  Chronic microangiopathic changes. 2.  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    Le Aldridge PA-C   06/07/24  12:45 PM    This chart was completed in part utilizing [employer] speech voice recognition software. Random word insertions, pronoun errors, and incomplete sentences are an occasional consequence of this system due to software limitations, and ambient noise. Any questions or concerns about the content, text, or information contained within the body of this dictation should be directly addressed to the provider for clarification.

## 2024-06-07 NOTE — PROGRESS NOTES
[facility]  Progress Note  Name: Finn Oleary I  MRN: [id number]  Unit/Bed#: -[facility]iy[facility] I Date of Admission: 6/5/2024   Date of Service: 6/7/2024 I Hospital Day: 1    Assessment & Plan   * Osteomyelitis of great toe of right foot (HCC)  Assessment & Plan  Endorses right great toe pain and swelling for at least the last few days, however she is unclear if true time of onset  Deep ulcer noted to right great toe, concern for osteomyelitis-some questionable cortical disruption on x-ray  CRP 4.7, ESR 2  Continue ceftriaxone and vancomycin for now-avoided cefepime due to concurrent hepatic encephalopathy  MRI right foot consistent with osteomyelitis of the tuft of first distal phalanx  Podiatry consulted - OR anticipated 6/7  PT/OT to evaluate postoperatively    Positive blood culture  Assessment & Plan  1 out of 2 blood culture results 6/7 revealing gram-positive cocci in clusters  Repeat cultures pending  Currently on IV Rocephin and vancomycin, continue    Elevated TSH  Assessment & Plan  TSH elevated at 5.72  Free T4: 0.96    Thrombocytopenia (HCC)  Assessment & Plan  Platelets 119 K on admit  Secondary to cirrhosis of the liver  Hold VTE prophylaxis if platelets drop below 15 K or if INR becomes > 2.0    Metabolic encephalopathy  Assessment & Plan  Slow to respond and some word finding difficulty, though alert and oriented x 4  CTh without acute abnormality  Ammonia level 80, and patient admits to noncompliance with lactulose  Suspect metabolic encephalopathy multifactorial in setting of acute alcohol intoxication, hepatic encephalopathy, as well as acute infection  Will restart lactulose with goal of 3-4 BM daily  If patient fails to improve with lactulose and treatment of infection-consider MRI brain    Alcohol abuse  Assessment & Plan  Reports that she only drinks 1 day a week, with last drink just prior to arrival to the ED  Will place on CIWA protocol    Generalized anxiety  disorder  Assessment & Plan  Maintained on Xanax 0.5 Mg twice daily with additional as needed dose daily  PDMP reviewed, continue home Xanax dosing    Tobacco use disorder  Assessment & Plan  Admits to smoking half a pack per day  NRT while inpatient    Alcoholic cirrhosis (HCC)  Assessment & Plan  Longstanding history of alcoholic cirrhosis complicated by ascites, esophageal varices, and hepatic encephalopathy in the past  Decompensated in the setting of hepatic encephalopathy currently, but no evidence of ascites and labs are not overtly abnormal with mildly elevated T. bili at 1.65, no ascites, and only slight elevation of coags with INR at 1.21 and PT at 15.8  GI consulted  Resume lactulose  Admits to noncompliance with Lasix and spironolactone, held on ordering on admit as possibly will need surgical intervention for right great toe also with concern for osteomyelitis           VTE Pharmacologic Prophylaxis: VTE Score: 3 Moderate Risk (Score 3-4) - Pharmacological DVT Prophylaxis Contraindicated. Sequential Compression Devices Ordered. -perioperative    Mobility:   Basic Mobility Inpatient Raw Score: 17  JH-HLM Goal: 5: Stand one or more mins  JH-HLM Achieved: 6: Walk 10 steps or more  JH-HLM Goal achieved. Continue to encourage appropriate mobility.    Patient Centered Rounds: I performed bedside rounds with nursing staff today.   Discussions with Specialists or Other Care Team Provider: Appreciate most recent input from podiatry.  Discussed case with GI team.    Education and Discussions with Family / Patient: Updated  (son) via phone.    Total Time Spent on Date of Encounter in care of patient: 35 mins. This time was spent on one or more of the following: performing physical exam; counseling and coordination of care; obtaining or reviewing history; documenting in the medical record; reviewing/ordering tests, medications or procedures; communicating with other healthcare professionals and  discussing with patient's family/caregivers.    Current Length of Stay: 1 day(s)  Current Patient Status: Inpatient   Certification Statement: The patient will continue to require additional inpatient hospital stay due to pending ret  Discharge Plan: Anticipate discharge in 48-72 hrs to discharge location to be determined pending rehab evaluations.    Code Status: Level 1 - Full Code    Subjective:   Patient reports she is somewhat nervous to have her toe removed.  We discussed her success with prior surgeries.  She also reports neuropathic pain in bilateral lower extremities.  Discussed continuing on gabapentin and considering increase if necessary.    Objective:     Vitals:   Temp (24hrs), Av.7 °F (35.9 °C), Min:96.6 °F (35.9 °C), Max:96.8 °F (36 °C)    Temp:  [96.6 °F (35.9 °C)-96.8 °F (36 °C)] 96.8 °F (36 °C)  HR:  [58-78] 61  Resp:  [16] 16  BP: ()/() 125/61  SpO2:  [91 %-97 %] 91 %  Body mass index is 21.72 kg/m².     Input and Output Summary (last 24 hours):     Intake/Output Summary (Last 24 hours) at 2024 1046  Last data filed at 2024 0601  Gross per 24 hour   Intake 240 ml   Output --   Net 240 ml       Physical Exam:   Physical Exam  Musculoskeletal:      Left foot: Deformity (s/p removal of nails) present.   Feet:      Comments: R great toe dressing clean, dry, intact  Psychiatric:         Mood and Affect: Mood is anxious.        Additional Data:     Labs:  Results from last 7 days   Lab Units 24  0256   WBC Thousand/uL 3.51*   HEMOGLOBIN g/dL 12.3   HEMATOCRIT % 36.5   PLATELETS Thousands/uL 96*   SEGS PCT % 39*   LYMPHO PCT % 48*   MONO PCT % 9   EOS PCT % 2     Results from last 7 days   Lab Units 24  0256   SODIUM mmol/L 137   POTASSIUM mmol/L 3.7   CHLORIDE mmol/L 103   CO2 mmol/L 33*   BUN mg/dL 9   CREATININE mg/dL 0.78   ANION GAP mmol/L 1*   CALCIUM mg/dL 8.8   ALBUMIN g/dL 2.6*   TOTAL BILIRUBIN mg/dL 1.25*   ALK PHOS U/L 77   ALT U/L 9   AST U/L 26   GLUCOSE  RANDOM mg/dL 86     Results from last 7 days   Lab Units 06/06/24  0517   INR  1.36*             Results from last 7 days   Lab Units 06/05/24  2229   LACTIC ACID mmol/L 1.7   PROCALCITONIN ng/ml <0.05       Lines/Drains:  Invasive Devices       Peripheral Intravenous Line  Duration             Peripheral IV 06/05/24 Left;Ventral (anterior) Forearm 1 day    Peripheral IV 06/06/24 Dorsal (posterior);Left Hand 1 day                          Imaging: Reviewed radiology reports from this admission including: MRI foot    Recent Cultures (last 7 days):   Results from last 7 days   Lab Units 06/05/24  2229   BLOOD CULTURE  No Growth at 24 hrs.   GRAM STAIN RESULT  Gram positive cocci in clusters*       Last 24 Hours Medication List:   Current Facility-Administered Medications   Medication Dose Route Frequency Provider Last Rate    acetaminophen  650 mg Oral Q8H PRN Cassandra L Ghulam, PA-C      ALPRAZolam  0.5 mg Oral BID Cassandra L Parmar, PA-C      ALPRAZolam  0.5 mg Oral Daily PRN Cassandra L Ghulam, PA-C      cefTRIAXone  1,000 mg Intravenous Q24H Cassandralee Parmar, PA-C 1,000 mg (06/06/24 2043)    Fluticasone Furoate-Vilanterol  1 puff Inhalation Daily Cassandralee Parmar, PA-C      folic acid  1 mg Oral Daily Cassandra PATRICK Parmar, PA-C      furosemide  20 mg Oral Daily Danielle Zelaya MD      gabapentin  300 mg Oral HS Cassandralee Parmar, PA-C      lactulose  20 g Oral TID Cassandralee Parmar, PA-C      metoprolol succinate  12.5 mg Oral Daily Cassandra PATRICK Parmar, PA-C      nicotine  1 patch Transdermal Daily Cassandra PATRICK Parmar, PA-C      ondansetron  4 mg Intravenous Q6H PRN Cassandra L Ghulam, PA-C      pantoprazole  40 mg Intravenous Q12H DANISH Royal      spironolactone  50 mg Oral Daily Danielle Zelaya MD      thiamine  100 mg Oral Daily Cassandra L Ghulam, PA-C      vancomycin  12 mg/kg Intravenous Q12H Cassandralee Parmar, PA-C 750 mg (06/07/24 0846)        Today, Patient Was Seen By: Lilian Leyva,  PALindaC    **Please Note: This note may have been constructed using a voice recognition system.**

## 2024-06-07 NOTE — ANESTHESIA POSTPROCEDURE EVALUATION
Post-Op Assessment Note    CV Status:  Stable  Pain Score: 0    Pain management: adequate       Mental Status:  Alert and sleepy   Hydration Status:  Euvolemic   PONV Controlled:  Controlled   Airway Patency:  Patent     Post Op Vitals Reviewed: Yes    No anethesia notable event occurred.    Staff: CRNA               BP      Temp      Pulse     Resp      SpO2

## 2024-06-07 NOTE — ANESTHESIA PREPROCEDURE EVALUATION
Procedure:  AMPUTATION RIGHT GREAT TOE (Right: Toe)    Relevant Problems   CARDIO   (+) Esophageal varices (HCC)      GI/HEPATIC   (+) Alcoholic cirrhosis (HCC)      HEMATOLOGY   (+) Thrombocytopenia (HCC)      NEURO/PSYCH   (+) Generalized anxiety disorder   (+) Major depressive disorder, recurrent episode, moderate (HCC)      Behavioral Health   (+) Alcohol abuse   (+) Tobacco use disorder      Neurology/Sleep   (+) Hepatic encephalopathy (HCC)   (+) Metabolic encephalopathy      Other   (+) Osteomyelitis of great toe of right foot (HCC)        Physical Exam    Airway       Dental       Cardiovascular      Pulmonary      Other Findings  post-pubertal.      Anesthesia Plan  ASA Score- 3     Anesthesia Type- IV sedation with anesthesia with ASA Monitors.         Additional Monitors:     Airway Plan:            Plan Factors-    Chart reviewed.    Patient summary reviewed.                  Induction- intravenous.    Postoperative Plan-     Perioperative Resuscitation Plan - Level 1 - Full Code.       Informed Consent- Anesthetic plan and risks discussed with patient.  I personally reviewed this patient with the CRNA. Discussed and agreed on the Anesthesia Plan with the CRNA..

## 2024-06-08 PROBLEM — J44.9 COPD (CHRONIC OBSTRUCTIVE PULMONARY DISEASE) (HCC): Status: ACTIVE | Noted: 2024-06-08

## 2024-06-08 LAB
ALBUMIN SERPL BCP-MCNC: 2.8 G/DL (ref 3.5–5)
ALP SERPL-CCNC: 90 U/L (ref 34–104)
ALT SERPL W P-5'-P-CCNC: 9 U/L (ref 7–52)
ANION GAP SERPL CALCULATED.3IONS-SCNC: 3 MMOL/L (ref 4–13)
AST SERPL W P-5'-P-CCNC: 26 U/L (ref 13–39)
BASOPHILS # BLD AUTO: 0.02 THOUSANDS/ÂΜL (ref 0–0.1)
BASOPHILS NFR BLD AUTO: 1 % (ref 0–1)
BILIRUB SERPL-MCNC: 1.18 MG/DL (ref 0.2–1)
BUN SERPL-MCNC: 9 MG/DL (ref 5–25)
CALCIUM ALBUM COR SERPL-MCNC: 10.2 MG/DL (ref 8.3–10.1)
CALCIUM SERPL-MCNC: 9.2 MG/DL (ref 8.4–10.2)
CHLORIDE SERPL-SCNC: 99 MMOL/L (ref 96–108)
CO2 SERPL-SCNC: 32 MMOL/L (ref 21–32)
CREAT SERPL-MCNC: 0.82 MG/DL (ref 0.6–1.3)
EOSINOPHIL # BLD AUTO: 0.09 THOUSAND/ÂΜL (ref 0–0.61)
EOSINOPHIL NFR BLD AUTO: 2 % (ref 0–6)
ERYTHROCYTE [DISTWIDTH] IN BLOOD BY AUTOMATED COUNT: 12.6 % (ref 11.6–15.1)
GFR SERPL CREATININE-BSD FRML MDRD: 73 ML/MIN/1.73SQ M
GLUCOSE SERPL-MCNC: 112 MG/DL (ref 65–140)
HCT VFR BLD AUTO: 38.1 % (ref 34.8–46.1)
HGB BLD-MCNC: 12.7 G/DL (ref 11.5–15.4)
IMM GRANULOCYTES # BLD AUTO: 0.01 THOUSAND/UL (ref 0–0.2)
IMM GRANULOCYTES NFR BLD AUTO: 0 % (ref 0–2)
LYMPHOCYTES # BLD AUTO: 1.56 THOUSANDS/ÂΜL (ref 0.6–4.47)
LYMPHOCYTES NFR BLD AUTO: 40 % (ref 14–44)
MCH RBC QN AUTO: 33.3 PG (ref 26.8–34.3)
MCHC RBC AUTO-ENTMCNC: 33.3 G/DL (ref 31.4–37.4)
MCV RBC AUTO: 100 FL (ref 82–98)
MONOCYTES # BLD AUTO: 0.38 THOUSAND/ÂΜL (ref 0.17–1.22)
MONOCYTES NFR BLD AUTO: 10 % (ref 4–12)
NEUTROPHILS # BLD AUTO: 1.83 THOUSANDS/ÂΜL (ref 1.85–7.62)
NEUTS SEG NFR BLD AUTO: 47 % (ref 43–75)
NRBC BLD AUTO-RTO: 0 /100 WBCS
PLATELET # BLD AUTO: 93 THOUSANDS/UL (ref 149–390)
PMV BLD AUTO: 11 FL (ref 8.9–12.7)
POTASSIUM SERPL-SCNC: 3.7 MMOL/L (ref 3.5–5.3)
PROT SERPL-MCNC: 5.5 G/DL (ref 6.4–8.4)
RBC # BLD AUTO: 3.81 MILLION/UL (ref 3.81–5.12)
SODIUM SERPL-SCNC: 134 MMOL/L (ref 135–147)
VANCOMYCIN SERPL-MCNC: 27.2 UG/ML (ref 10–20)
WBC # BLD AUTO: 3.89 THOUSAND/UL (ref 4.31–10.16)

## 2024-06-08 PROCEDURE — C9113 INJ PANTOPRAZOLE SODIUM, VIA: HCPCS | Performed by: PODIATRIST

## 2024-06-08 PROCEDURE — 85025 COMPLETE CBC W/AUTO DIFF WBC: CPT | Performed by: INTERNAL MEDICINE

## 2024-06-08 PROCEDURE — 99233 SBSQ HOSP IP/OBS HIGH 50: CPT | Performed by: PHYSICIAN ASSISTANT

## 2024-06-08 PROCEDURE — 80053 COMPREHEN METABOLIC PANEL: CPT | Performed by: INTERNAL MEDICINE

## 2024-06-08 PROCEDURE — 80202 ASSAY OF VANCOMYCIN: CPT | Performed by: INTERNAL MEDICINE

## 2024-06-08 PROCEDURE — 99232 SBSQ HOSP IP/OBS MODERATE 35: CPT | Performed by: PODIATRIST

## 2024-06-08 RX ORDER — VANCOMYCIN HYDROCHLORIDE 1 G/200ML
17.5 INJECTION, SOLUTION INTRAVENOUS EVERY 24 HOURS
Status: DISCONTINUED | OUTPATIENT
Start: 2024-06-08 | End: 2024-06-10

## 2024-06-08 RX ORDER — ALBUTEROL SULFATE 90 UG/1
2 AEROSOL, METERED RESPIRATORY (INHALATION) EVERY 4 HOURS PRN
Status: DISCONTINUED | OUTPATIENT
Start: 2024-06-08 | End: 2024-06-10 | Stop reason: HOSPADM

## 2024-06-08 RX ADMIN — OXYCODONE HYDROCHLORIDE 10 MG: 10 TABLET ORAL at 09:48

## 2024-06-08 RX ADMIN — SPIRONOLACTONE 50 MG: 25 TABLET ORAL at 09:49

## 2024-06-08 RX ADMIN — OXYCODONE HYDROCHLORIDE 10 MG: 10 TABLET ORAL at 14:15

## 2024-06-08 RX ADMIN — RIFAXIMIN 550 MG: 550 TABLET ORAL at 09:48

## 2024-06-08 RX ADMIN — ALPRAZOLAM 0.5 MG: 0.5 TABLET ORAL at 09:48

## 2024-06-08 RX ADMIN — PANTOPRAZOLE SODIUM 40 MG: 40 INJECTION, POWDER, FOR SOLUTION INTRAVENOUS at 09:48

## 2024-06-08 RX ADMIN — PANTOPRAZOLE SODIUM 40 MG: 40 INJECTION, POWDER, FOR SOLUTION INTRAVENOUS at 20:53

## 2024-06-08 RX ADMIN — LACTULOSE 20 G: 20 SOLUTION ORAL at 09:48

## 2024-06-08 RX ADMIN — IPRATROPIUM BROMIDE 2 PUFF: 17 AEROSOL, METERED RESPIRATORY (INHALATION) at 18:00

## 2024-06-08 RX ADMIN — OXYCODONE HYDROCHLORIDE 10 MG: 10 TABLET ORAL at 05:25

## 2024-06-08 RX ADMIN — FOLIC ACID 1 MG: 1 TABLET ORAL at 09:49

## 2024-06-08 RX ADMIN — ALPRAZOLAM 0.5 MG: 0.5 TABLET ORAL at 20:53

## 2024-06-08 RX ADMIN — IPRATROPIUM BROMIDE 2 PUFF: 17 AEROSOL, METERED RESPIRATORY (INHALATION) at 20:56

## 2024-06-08 RX ADMIN — VANCOMYCIN HYDROCHLORIDE 1000 MG: 1 INJECTION, SOLUTION INTRAVENOUS at 22:06

## 2024-06-08 RX ADMIN — OXYCODONE HYDROCHLORIDE 10 MG: 10 TABLET ORAL at 19:43

## 2024-06-08 RX ADMIN — METOPROLOL SUCCINATE 12.5 MG: 25 TABLET, FILM COATED, EXTENDED RELEASE ORAL at 09:48

## 2024-06-08 RX ADMIN — GABAPENTIN 300 MG: 300 CAPSULE ORAL at 20:53

## 2024-06-08 RX ADMIN — ALBUTEROL SULFATE 2 PUFF: 90 AEROSOL, METERED RESPIRATORY (INHALATION) at 12:20

## 2024-06-08 RX ADMIN — RIFAXIMIN 550 MG: 550 TABLET ORAL at 20:53

## 2024-06-08 RX ADMIN — CEFTRIAXONE 1000 MG: 1 INJECTION, SOLUTION INTRAVENOUS at 20:53

## 2024-06-08 RX ADMIN — IPRATROPIUM BROMIDE 2 PUFF: 17 AEROSOL, METERED RESPIRATORY (INHALATION) at 12:20

## 2024-06-08 RX ADMIN — FUROSEMIDE 20 MG: 20 TABLET ORAL at 09:49

## 2024-06-08 RX ADMIN — Medication 100 MG: at 09:49

## 2024-06-08 NOTE — ASSESSMENT & PLAN NOTE
Platelets 119 K on admit, 93 K today  Secondary to cirrhosis of the liver  Not currently on DVT prophylaxis

## 2024-06-08 NOTE — PROGRESS NOTES
[facility] Podiatry - Progress Note  Patient: Finn Oleary 68 y.o. female   MRN: [id number]  PCP: Scot Brink MD  Unit/Bed#: -01 Encounter: [id number]  Date Of Visit: 24    ASSESSMENT:    Finn Oleary is a 68 y.o. female with:    Status post day 1 partial right hallux amputation for osteomyelitis of distal phalanx right great toe.  Alcoholic peripheral neuropathy  Metabolic encephalopathy, alcohol sclerosis, thrombocytopenia, elevated TSH, alcohol abuse, generalized anxiety disorder, tobacco use disorder    PLAN:    Post-surgical dressing located on surgical site and affected extremity were left intact today.  Will plan for first complete post-surgical dressing change tomorrow.  Vitals signs stable. Patient afebrile with no leukocytosis. No erythema, edema, or lymphangitis noted either proximal or distal to the dressings.  Pain is well controlled. Continue current pain management regimen.  Continue partial weight bearing to affected extremity with elevation while non-ambulatory.  Rest of care per primary team.      SUBJECTIVE:     The patient was seen, evaluated, and assessed at bedside today. The patient was awake, alert, and in no acute distress. The patient reports some pain at this time. Pain is well controlled with current pain management regimen. Patient reports normal appetite and bowel movement. Patient denies N/V/F/chills/SOB/CP.      OBJECTIVE:     Vitals:   /74   Pulse 75   Temp (!) 96.8 °F (36 °C)   Resp 12   Wt 55.6 kg (122 lb 9.6 oz)   LMP  (LMP Unknown)   SpO2 91%   BMI 21.72 kg/m²     Temp (24hrs), Av.6 °F (36.4 °C), Min:96.8 °F (36 °C), Max:98.2 °F (36.8 °C)      Dressings on affected extremity: C/D/I    Physical Exam  Cardiovascular status at baseline. Neurological status at baseline. No erythema, edema, or lymphangitis noted from dressing. Lower extremity temperature WNL.    Additional Data:     Labs:    Results from last 7 days   Lab Units 24  0225   WBC  "Thousand/uL 3.89*   HEMOGLOBIN g/dL 12.7   HEMATOCRIT % 38.1   PLATELETS Thousands/uL 93*   SEGS PCT % 47   LYMPHO PCT % 40   MONO PCT % 10   EOS PCT % 2     Results from last 7 days   Lab Units 06/08/24  0225   POTASSIUM mmol/L 3.7   CHLORIDE mmol/L 99   CO2 mmol/L 32   BUN mg/dL 9   CREATININE mg/dL 0.82   CALCIUM mg/dL 9.2   ALK PHOS U/L 90   ALT U/L 9   AST U/L 26     Results from last 7 days   Lab Units 06/06/24  0517   INR  1.36*       * I Have Reviewed All Lab Data Listed Above.    Recent Cultures (last 7 days):     Results from last 7 days   Lab Units 06/07/24  0540 06/05/24  2229   BLOOD CULTURE  No Growth at 24 hrs.  No Growth at 24 hrs. Staphylococcus coagulase negative*  No Growth at 48 hrs.   GRAM STAIN RESULT   --  Gram positive cocci in clusters*           Imaging: I have personally reviewed pertinent post-operative films in PACS:  EKG, Pathology, and Other Studies: I have personally reviewed pertinent reports.      ** Please Note: Portions of the record may have been created with voice recognition software. Occasional wrong word or \"sound a like\" substitutions may have occurred due to the inherent limitations of voice recognition software. Read the chart carefully and recognize, using context, where substitutions have occurred. **     "

## 2024-06-08 NOTE — PLAN OF CARE
Problem: Potential for Falls  Goal: Patient will remain free of falls  Description: INTERVENTIONS:  - Educate patient/family on patient safety including physical limitations  - Instruct patient to call for assistance with activity   - Consult OT/PT to assist with strengthening/mobility   - Keep Call bell within reach  - Keep bed low and locked with side rails adjusted as appropriate  - Keep care items and personal belongings within reach  - Initiate and maintain comfort rounds  - Make Fall Risk Sign visible to staff  - Offer Toileting every 2 Hours, in advance of need  - Initiate/Maintain alarm  - Obtain necessary fall risk management equipment:   - Apply yellow socks and bracelet for high fall risk patients  - Consider moving patient to room near nurses station  6/8/2024 1042 by Karena Castillo RN  Outcome: Progressing  6/8/2024 1042 by Karena Castillo RN  Outcome: Progressing     Problem: PAIN - ADULT  Goal: Verbalizes/displays adequate comfort level or baseline comfort level  Description: Interventions:  - Encourage patient to monitor pain and request assistance  - Assess pain using appropriate pain scale  - Administer analgesics based on type and severity of pain and evaluate response  - Implement non-pharmacological measures as appropriate and evaluate response  - Consider cultural and social influences on pain and pain management  - Notify physician/advanced practitioner if interventions unsuccessful or patient reports new pain  6/8/2024 1042 by Karena Castillo RN  Outcome: Progressing  6/8/2024 1042 by Karena Castillo RN  Outcome: Progressing     Problem: INFECTION - ADULT  Goal: Absence or prevention of progression during hospitalization  Description: INTERVENTIONS:  - Assess and monitor for signs and symptoms of infection  - Monitor lab/diagnostic results  - Monitor all insertion sites, i.e. indwelling lines, tubes, and drains  - Monitor endotracheal if appropriate and nasal secretions for changes in amount  and color  - Paulina appropriate cooling/warming therapies per order  - Administer medications as ordered  - Instruct and encourage patient and family to use good hand hygiene technique  - Identify and instruct in appropriate isolation precautions for identified infection/condition  6/8/2024 1042 by Karena Castillo RN  Outcome: Progressing  6/8/2024 1042 by Karena Castillo RN  Outcome: Progressing  Goal: Absence of fever/infection during neutropenic period  Description: INTERVENTIONS:  - Monitor WBC    6/8/2024 1042 by Karena Castillo RN  Outcome: Progressing  6/8/2024 1042 by Karena Castillo RN  Outcome: Progressing     Problem: SAFETY ADULT  Goal: Patient will remain free of falls  Description: INTERVENTIONS:  - Educate patient/family on patient safety including physical limitations  - Instruct patient to call for assistance with activity   - Consult OT/PT to assist with strengthening/mobility   - Keep Call bell within reach  - Keep bed low and locked with side rails adjusted as appropriate  - Keep care items and personal belongings within reach  - Initiate and maintain comfort rounds  - Make Fall Risk Sign visible to staff  - Offer Toileting every 2 Hours, in advance of need  - Initiate/Maintain alarm  - Obtain necessary fall risk management equipment:   - Apply yellow socks and bracelet for high fall risk patients  - Consider moving patient to room near nurses station  6/8/2024 1042 by Karena Castillo RN  Outcome: Progressing  6/8/2024 1042 by Karena Castillo RN  Outcome: Progressing  Goal: Maintain or return to baseline ADL function  Description: INTERVENTIONS:  - Educate patient/family on patient safety including physical limitations  - Instruct patient to call for assistance with activity   - Consult OT/PT to assist with strengthening/mobility   - Keep Call bell within reach  - Keep bed low and locked with side rails adjusted as appropriate  - Keep care items and personal belongings within reach  - Initiate and  maintain comfort rounds  - Make Fall Risk Sign visible to staff  - Offer Toileting every 2 Hours, in advance of need  - Initiate/Maintain alarm  - Obtain necessary fall risk management equipment:   - Apply yellow socks and bracelet for high fall risk patients  - Consider moving patient to room near nurses station  6/8/2024 1042 by Karena Castillo RN  Outcome: Progressing  6/8/2024 1042 by Karena Castillo RN  Outcome: Progressing  Goal: Maintains/Returns to pre admission functional level  Description: INTERVENTIONS:  - Perform AM-PAC 6 Click Basic Mobility/ Daily Activity assessment daily.  - Set and communicate daily mobility goal to care team and patient/family/caregiver.   - Collaborate with rehabilitation services on mobility goals if consulted  - Perform Range of Motion 6 times a day.  - Reposition patient every 2 hours.  - Dangle patient 4 times a day  - Stand patient 4 times a day  - Ambulate patient 4 times a day  - Out of bed to chair 3 times a day   - Out of bed for meals 3 times a day  - Out of bed for toileting  - Record patient progress and toleration of activity level   6/8/2024 1042 by Karena Castillo RN  Outcome: Progressing  6/8/2024 1042 by Karena Castillo RN  Outcome: Progressing     Problem: DISCHARGE PLANNING  Goal: Discharge to home or other facility with appropriate resources  Description: INTERVENTIONS:  - Identify barriers to discharge w/patient and caregiver  - Arrange for needed discharge resources and transportation as appropriate  - Identify discharge learning needs (meds, wound care, etc.)  - Arrange for interpretive services to assist at discharge as needed  - Refer to Case Management Department for coordinating discharge planning if the patient needs post-hospital services based on physician/advanced practitioner order or complex needs related to functional status, cognitive ability, or social support system  6/8/2024 1042 by Karena Castillo RN  Outcome: Progressing  6/8/2024 1042 by Karena  JESÚS Castillo  Outcome: Progressing     Problem: Knowledge Deficit  Goal: Patient/family/caregiver demonstrates understanding of disease process, treatment plan, medications, and discharge instructions  Description: Complete learning assessment and assess knowledge base.  Interventions:  - Provide teaching at level of understanding  - Provide teaching via preferred learning methods  6/8/2024 1042 by Karena Castillo RN  Outcome: Progressing  6/8/2024 1042 by Karena Castillo RN  Outcome: Progressing     Problem: Prexisting or High Potential for Compromised Skin Integrity  Goal: Skin integrity is maintained or improved  Description: INTERVENTIONS:  - Identify patients at risk for skin breakdown  - Assess and monitor skin integrity  - Assess and monitor nutrition and hydration status  - Monitor labs   - Assess for incontinence   - Turn and reposition patient  - Assist with mobility/ambulation  - Relieve pressure over bony prominences  - Avoid friction and shearing  - Provide appropriate hygiene as needed including keeping skin clean and dry  - Evaluate need for skin moisturizer/barrier cream  - Collaborate with interdisciplinary team   - Patient/family teaching  - Consider wound care consult   6/8/2024 1042 by Karena Castillo RN  Outcome: Progressing  6/8/2024 1042 by Karena Castillo RN  Outcome: Progressing

## 2024-06-08 NOTE — PROGRESS NOTES
[facility] [place]  Progress Note  Name: Finn Oleary I  MRN: [id number]  Unit/Bed#: -Niyah I Date of Admission: 6/5/2024   Date of Service: 6/8/2024 I Hospital Day: 2    Assessment & Plan   COPD (chronic obstructive pulmonary disease) (HCC)  Assessment & Plan  Patient with concerns that she is not getting her usual inhalers. She states she does not use breo. She has a combivent inhaler in her purse which she states she uses as well as albuterol PRN  Breo discontinued, atrovent and albuterol ordered    Positive blood culture  Assessment & Plan  1 out of 2 blood culture results 6/7 revealing gram-positive cocci in clusters, coag neg staph  Repeat cultures no growth at 24 hours  Currently on IV Rocephin and vancomycin, continue    Elevated TSH  Assessment & Plan  TSH slightly elevated at 5.72  Free T4: 0.96- normal  Recommend follow up blood work with PCP in 6 weeks after discharge for ongoing monitoring    Thrombocytopenia (HCC)  Assessment & Plan  Platelets 119 K on admit, 93 K today  Secondary to cirrhosis of the liver  Not currently on DVT prophylaxis    Metabolic encephalopathy  Assessment & Plan  Slow to respond and some word finding difficulty, though alert and oriented x 4  CTh without acute abnormality  Ammonia level 80, and patient admits to noncompliance with lactulose  Suspect metabolic encephalopathy multifactorial in setting of acute alcohol intoxication, hepatic encephalopathy, as well as acute infection  Was started on lactulose with goal of 3-4 BM daily  Answering questions appropriately today. Continue to monitor closely    Alcohol abuse  Assessment & Plan  Reports that she only drinks 1 day a week, with last drink just prior to arrival to the ED  Will place on CIWA protocol    Generalized anxiety disorder  Assessment & Plan  Maintained on Xanax 0.5 Mg twice daily with additional as needed dose daily  PDMP reviewed, continue home Xanax dosing    Tobacco use  disorder  Assessment & Plan  Admits to smoking half a pack per day  NRT while inpatient    Alcoholic cirrhosis (HCC)  Assessment & Plan  Longstanding history of alcoholic cirrhosis complicated by ascites, esophageal varices, and hepatic encephalopathy in the past  Decompensated in the setting of hepatic encephalopathy currently, but no evidence of ascites and labs are not overtly abnormal with mildly elevated T. bili at 1.65, no ascites, and only slight elevation of coags with INR at 1.21 and PT at 15.8  GI consulted  Resume lactulose  Admits to noncompliance with Lasix and spironolactone, held on admit as possibly will need surgical intervention for right great toe also with concern for osteomyelitis. Now resumed on 6/6    * Osteomyelitis of great toe of right foot (HCC)  Assessment & Plan  Endorses right great toe pain and swelling for at least the last few days, however she is unclear if true time of onset  Deep ulcer noted to right great toe, concern for osteomyelitis-some questionable cortical disruption on x-ray  CRP 4.7, ESR 2  Continue ceftriaxone and vancomycin for now-avoided cefepime due to concurrent hepatic encephalopathy  MRI right foot consistent with osteomyelitis of the tuft of first distal phalanx  POD #1 right great toe amputation by podiatry. Dressing C/D/I.   WBC today 7.73, continue to monitor             VTE Pharmacologic Prophylaxis:     Mechanical VTE Prophylaxis in Place: Yes    Patient Centered Rounds: I have performed bedside rounds with nursing staff today.    Discussions with Specialists or Other Care Team Provider: reviewed podiatry, GI notes    Education and Discussions with Family / Patient: patient updated, declined call to     Time Spent for Care: 45 minutes.  More than 50% of total time spent on counseling and coordination of care as described above.    Current Length of Stay: 2 day(s)    Current Patient Status: Inpatient   Certification Statement: The patient will  continue to require additional inpatient hospital stay due to continue IV antibiotics    Discharge Plan: pending therapy recommendations. anticipate discharge in next 48-72 hours    Code Status: Level 1 - Full Code      Subjective:   Patient feels well. She is POD #1 s/p great toe amputation. Pain controlled. She states she is concerned she is not getting the correct inhalers. She has her home inhaler in her purse.     Objective:     Vitals:   Temp (24hrs), Av.6 °F (36.4 °C), Min:96.8 °F (36 °C), Max:98.2 °F (36.8 °C)    Temp:  [96.8 °F (36 °C)-98.2 °F (36.8 °C)] 96.8 °F (36 °C)  HR:  [57-75] 75  Resp:  [12-24] 12  BP: ()/(60-94) 129/74  SpO2:  [90 %-97 %] 91 %  Body mass index is 21.72 kg/m².     Input and Output Summary (last 24 hours):       Intake/Output Summary (Last 24 hours) at 2024 1334  Last data filed at 2024 1001  Gross per 24 hour   Intake 740 ml   Output 800 ml   Net -60 ml       Physical Exam:     Physical Exam  Vitals reviewed.   Constitutional:       General: She is not in acute distress.     Appearance: She is not ill-appearing or diaphoretic.   HENT:      Head: Normocephalic and atraumatic.      Nose: Nose normal.      Mouth/Throat:      Pharynx: Oropharynx is clear.   Cardiovascular:      Rate and Rhythm: Normal rate.   Pulmonary:      Effort: Pulmonary effort is normal. No respiratory distress.      Breath sounds: Normal breath sounds.   Abdominal:      General: Bowel sounds are normal. There is no distension.      Palpations: Abdomen is soft.      Tenderness: There is no abdominal tenderness.   Musculoskeletal:      Comments: S/p great toe amputation. Dressing C/D/I   Skin:     General: Skin is warm and dry.   Neurological:      Mental Status: She is alert and oriented to person, place, and time.           Additional Data:     Labs:    Results from last 7 days   Lab Units 24  0225   WBC Thousand/uL 3.89*   HEMOGLOBIN g/dL 12.7   HEMATOCRIT % 38.1   PLATELETS Thousands/uL  93*   SEGS PCT % 47   LYMPHO PCT % 40   MONO PCT % 10   EOS PCT % 2     Results from last 7 days   Lab Units 06/08/24  0225   SODIUM mmol/L 134*   POTASSIUM mmol/L 3.7   CHLORIDE mmol/L 99   CO2 mmol/L 32   BUN mg/dL 9   CREATININE mg/dL 0.82   ANION GAP mmol/L 3*   CALCIUM mg/dL 9.2   ALBUMIN g/dL 2.8*   TOTAL BILIRUBIN mg/dL 1.18*   ALK PHOS U/L 90   ALT U/L 9   AST U/L 26   GLUCOSE RANDOM mg/dL 112     Results from last 7 days   Lab Units 06/06/24  0517   INR  1.36*             Results from last 7 days   Lab Units 06/05/24  2229   LACTIC ACID mmol/L 1.7   PROCALCITONIN ng/ml <0.05           * I Have Reviewed All Lab Data Listed Above.  * Additional Pertinent Lab Tests Reviewed: All Labs For Current Hospital Admission Reviewed        Recent Cultures (last 7 days):     Results from last 7 days   Lab Units 06/07/24  0540 06/05/24  2229   BLOOD CULTURE  No Growth at 24 hrs.  No Growth at 24 hrs. Staphylococcus coagulase negative*  No Growth at 48 hrs.   GRAM STAIN RESULT   --  Gram positive cocci in clusters*       Last 24 Hours Medication List:   Current Facility-Administered Medications   Medication Dose Route Frequency Provider Last Rate    acetaminophen  650 mg Oral Q8H PRN Niru Nicolasa, DPM      albuterol  2 puff Inhalation Q4H PRN Edna Hua PA-C      ALPRAZolam  0.5 mg Oral BID Niru Nicolasa, DPM      ALPRAZolam  0.5 mg Oral Daily PRN Niru Nicolasa, DPM      cefTRIAXone  1,000 mg Intravenous Q24H Niru Nicolasa, DPM 1,000 mg (06/07/24 2053)    folic acid  1 mg Oral Daily Niru Nicolasa, DPM      furosemide  20 mg Oral Daily Niru Nicolasa, DPM      gabapentin  300 mg Oral HS Niruradhika Mottla, DPM      ipratropium  2 puff Inhalation 4x Daily Edna Hua PA-C      lactulose  20 g Oral TID Niru Nicolasa, DPM      metoprolol succinate  12.5 mg Oral Daily Niru Nicolasa, DPM      nicotine  1 patch Transdermal Daily Niru Nicolasa, DPM      ondansetron  4 mg Intravenous Q6H PRN Niru Nicolasa, DPM       oxyCODONE  10 mg Oral Q4H PRN Lilian Leyva PA-C      oxyCODONE  5 mg Oral Q4H PRN Lilian Leyva PA-C      pantoprazole  40 mg Intravenous Q12H SJ Niru Nicolasa, DPM      rifaximin  550 mg Oral Q12H SJ Niru Nicolasa, DPM      spironolactone  50 mg Oral Daily Niru Nicolasa, DPM      thiamine  100 mg Oral Daily Niru Nicolasa, DPM      vancomycin  17.5 mg/kg Intravenous Q24H Cassandra Parmar PA-C          Today, Patient Was Seen By: Edna Hua PA-C    ** Please Note: Dictation voice to text software may have been used in the creation of this document. **

## 2024-06-08 NOTE — PLAN OF CARE
Problem: Potential for Falls  Goal: Patient will remain free of falls  Description: INTERVENTIONS:  - Educate patient/family on patient safety including physical limitations  - Instruct patient to call for assistance with activity   - Consult OT/PT to assist with strengthening/mobility   - Keep Call bell within reach  - Keep bed low and locked with side rails adjusted as appropriate  - Keep care items and personal belongings within reach  - Initiate and maintain comfort rounds  - Make Fall Risk Sign visible to staff  - Offer Toileting every x Hours, in advance of need  - Initiate/Maintain xalarm  - Obtain necessary fall risk management equipment: x  - Apply yellow socks and bracelet for high fall risk patients  - Consider moving patient to room near nurses station  Outcome: Progressing     Problem: PAIN - ADULT  Goal: Verbalizes/displays adequate comfort level or baseline comfort level  Description: Interventions:  - Encourage patient to monitor pain and request assistance  - Assess pain using appropriate pain scale  - Administer analgesics based on type and severity of pain and evaluate response  - Implement non-pharmacological measures as appropriate and evaluate response  - Consider cultural and social influences on pain and pain management  - Notify physician/advanced practitioner if interventions unsuccessful or patient reports new pain  Outcome: Progressing     Problem: INFECTION - ADULT  Goal: Absence or prevention of progression during hospitalization  Description: INTERVENTIONS:  - Assess and monitor for signs and symptoms of infection  - Monitor lab/diagnostic results  - Monitor all insertion sites, i.e. indwelling lines, tubes, and drains  - Monitor endotracheal if appropriate and nasal secretions for changes in amount and color  - [place]ler appropriate cooling/warming therapies per order  - Administer medications as ordered  - Instruct and encourage patient and family to use good hand hygiene  technique  - Identify and instruct in appropriate isolation precautions for identified infection/condition  Outcome: Progressing  Goal: Absence of fever/infection during neutropenic period  Description: INTERVENTIONS:  - Monitor WBC    Outcome: Progressing     Problem: SAFETY ADULT  Goal: Patient will remain free of falls  Description: INTERVENTIONS:  - Educate patient/family on patient safety including physical limitations  - Instruct patient to call for assistance with activity   - Consult OT/PT to assist with strengthening/mobility   - Keep Call bell within reach  - Keep bed low and locked with side rails adjusted as appropriate  - Keep care items and personal belongings within reach  - Initiate and maintain comfort rounds  - Make Fall Risk Sign visible to staff  - Offer Toileting every x Hours, in advance of need  - Initiate/Maintain xalarm  - Obtain necessary fall risk management equipment: x  - Apply yellow socks and bracelet for high fall risk patients  - Consider moving patient to room near nurses station  Outcome: Progressing  Goal: Maintain or return to baseline ADL function  Description: INTERVENTIONS:  -  Assess patient's ability to carry out ADLs; assess patient's baseline for ADL function and identify physical deficits which impact ability to perform ADLs (bathing, care of mouth/teeth, toileting, grooming, dressing, etc.)  - Assess/evaluate cause of self-care deficits   - Assess range of motion  - Assess patient's mobility; develop plan if impaired  - Assess patient's need for assistive devices and provide as appropriate  - Encourage maximum independence but intervene and supervise when necessary  - Involve family in performance of ADLs  - Assess for home care needs following discharge   - Consider OT consult to assist with ADL evaluation and planning for discharge  - Provide patient education as appropriate  Outcome: Progressing  Goal: Maintains/Returns to pre admission functional level  Description:  INTERVENTIONS:  - Perform AM-PAC 6 Click Basic Mobility/ Daily Activity assessment daily.  - Set and communicate daily mobility goal to care team and patient/family/caregiver.   - Collaborate with rehabilitation services on mobility goals if consulted  - Perform Range of Motion x times a day.  - Reposition patient every x hours.  - Dangle patient x times a day  - Stand patient x times a day  - Ambulate patient x times a day  - Out of bed to chair x times a day   - Out of bed for meals x times a day  - Out of bed for toileting  - Record patient progress and toleration of activity level   Outcome: Progressing     Problem: DISCHARGE PLANNING  Goal: Discharge to home or other facility with appropriate resources  Description: INTERVENTIONS:  - Identify barriers to discharge w/patient and caregiver  - Arrange for needed discharge resources and transportation as appropriate  - Identify discharge learning needs (meds, wound care, etc.)  - Arrange for interpretive services to assist at discharge as needed  - Refer to Case Management Department for coordinating discharge planning if the patient needs post-hospital services based on physician/advanced practitioner order or complex needs related to functional status, cognitive ability, or social support system  Outcome: Progressing     Problem: Knowledge Deficit  Goal: Patient/family/caregiver demonstrates understanding of disease process, treatment plan, medications, and discharge instructions  Description: Complete learning assessment and assess knowledge base.  Interventions:  - Provide teaching at level of understanding  - Provide teaching via preferred learning methods  Outcome: Progressing     Problem: Prexisting or High Potential for Compromised Skin Integrity  Goal: Skin integrity is maintained or improved  Description: INTERVENTIONS:  - Identify patients at risk for skin breakdown  - Assess and monitor skin integrity  - Assess and monitor nutrition and hydration  status  - Monitor labs   - Assess for incontinence   - Turn and reposition patient  - Assist with mobility/ambulation  - Relieve pressure over bony prominences  - Avoid friction and shearing  - Provide appropriate hygiene as needed including keeping skin clean and dry  - Evaluate need for skin moisturizer/barrier cream  - Collaborate with interdisciplinary team   - Patient/family teaching  - Consider wound care consult   Outcome: Progressing

## 2024-06-08 NOTE — ASSESSMENT & PLAN NOTE
1 out of 2 blood culture results 6/7 revealing gram-positive cocci in clusters, coag neg staph  Repeat cultures no growth at 24 hours  Currently on IV Rocephin and vancomycin, continue

## 2024-06-08 NOTE — ASSESSMENT & PLAN NOTE
Longstanding history of alcoholic cirrhosis complicated by ascites, esophageal varices, and hepatic encephalopathy in the past  Decompensated in the setting of hepatic encephalopathy currently, but no evidence of ascites and labs are not overtly abnormal with mildly elevated T. bili at 1.65, no ascites, and only slight elevation of coags with INR at 1.21 and PT at 15.8  GI consulted  Resume lactulose  Admits to noncompliance with Lasix and spironolactone, held on admit as possibly will need surgical intervention for right great toe also with concern for osteomyelitis. Now resumed on 6/6

## 2024-06-08 NOTE — ASSESSMENT & PLAN NOTE
Call to pt daughter, YAMILA on VM  Re miralax daily Reports that she only drinks 1 day a week, with last drink just prior to arrival to the ED  Will place on CIWA protocol

## 2024-06-08 NOTE — ASSESSMENT & PLAN NOTE
Endorses right great toe pain and swelling for at least the last few days, however she is unclear if true time of onset  Deep ulcer noted to right great toe, concern for osteomyelitis-some questionable cortical disruption on x-ray  CRP 4.7, ESR 2  Continue ceftriaxone and vancomycin for now-avoided cefepime due to concurrent hepatic encephalopathy  MRI right foot consistent with osteomyelitis of the tuft of first distal phalanx  POD #1 right great toe amputation by podiatry. Dressing C/D/I.   WBC today 7.73, continue to monitor

## 2024-06-08 NOTE — PROGRESS NOTES
Finn Oleary is a 68 y.o. female who is currently ordered Vancomycin IV with management by the Pharmacy Consult service.  Relevant clinical data and objective / subjective history reviewed.  Vancomycin Assessment:  Indication and Goal AUC/Trough: Bone/joint infection (goal -600, trough >10), -600, trough >10  Clinical Status: stable  Micro:     Renal Function:  SCr: 0.82 mg/dL  CrCl: 53.6 mL/min  Renal replacement: Not on dialysis  Days of Therapy: 3  Current Dose: 750mg IV Q12H  Vancomycin Plan:  New Dosinmg q24h  Estimated AUC: 459 mcg*hr/mL  Estimated Trough: 11.1 mcg/mL  Next Level: 0600 on 6/15  Renal Function Monitoring: Daily BMP and UOP  Pharmacy will continue to follow closely for s/sx of nephrotoxicity, infusion reactions and appropriateness of therapy.  BMP and CBC will be ordered per protocol. We will continue to follow the patient’s culture results and clinical progress daily.    David Perez, Pharmacist

## 2024-06-08 NOTE — ASSESSMENT & PLAN NOTE
TSH slightly elevated at 5.72  Free T4: 0.96- normal  Recommend follow up blood work with PCP in 6 weeks after discharge for ongoing monitoring

## 2024-06-08 NOTE — ASSESSMENT & PLAN NOTE
Slow to respond and some word finding difficulty, though alert and oriented x 4  CTh without acute abnormality  Ammonia level 80, and patient admits to noncompliance with lactulose  Suspect metabolic encephalopathy multifactorial in setting of acute alcohol intoxication, hepatic encephalopathy, as well as acute infection  Was started on lactulose with goal of 3-4 BM daily  Answering questions appropriately today. Continue to monitor closely

## 2024-06-08 NOTE — ASSESSMENT & PLAN NOTE
Patient with concerns that she is not getting her usual inhalers. She states she does not use breo. She has a combivent inhaler in her purse which she states she uses as well as albuterol PRN  Breo discontinued, atrovent and albuterol ordered

## 2024-06-09 LAB
ALBUMIN SERPL BCP-MCNC: 2.5 G/DL (ref 3.5–5)
ALP SERPL-CCNC: 100 U/L (ref 34–104)
ALT SERPL W P-5'-P-CCNC: 9 U/L (ref 7–52)
ANION GAP SERPL CALCULATED.3IONS-SCNC: 3 MMOL/L (ref 4–13)
AST SERPL W P-5'-P-CCNC: 26 U/L (ref 13–39)
BACTERIA BLD CULT: ABNORMAL
BASOPHILS # BLD AUTO: 0.02 THOUSANDS/ÂΜL (ref 0–0.1)
BASOPHILS NFR BLD AUTO: 1 % (ref 0–1)
BILIRUB SERPL-MCNC: 1.11 MG/DL (ref 0.2–1)
BUN SERPL-MCNC: 11 MG/DL (ref 5–25)
CALCIUM ALBUM COR SERPL-MCNC: 9.9 MG/DL (ref 8.3–10.1)
CALCIUM SERPL-MCNC: 8.7 MG/DL (ref 8.4–10.2)
CHLORIDE SERPL-SCNC: 99 MMOL/L (ref 96–108)
CO2 SERPL-SCNC: 33 MMOL/L (ref 21–32)
CREAT SERPL-MCNC: 0.77 MG/DL (ref 0.6–1.3)
EOSINOPHIL # BLD AUTO: 0.1 THOUSAND/ÂΜL (ref 0–0.61)
EOSINOPHIL NFR BLD AUTO: 3 % (ref 0–6)
ERYTHROCYTE [DISTWIDTH] IN BLOOD BY AUTOMATED COUNT: 11.9 % (ref 11.6–15.1)
GFR SERPL CREATININE-BSD FRML MDRD: 79 ML/MIN/1.73SQ M
GLUCOSE SERPL-MCNC: 102 MG/DL (ref 65–140)
GRAM STN SPEC: ABNORMAL
HCT VFR BLD AUTO: 36.3 % (ref 34.8–46.1)
HGB BLD-MCNC: 12.2 G/DL (ref 11.5–15.4)
IMM GRANULOCYTES # BLD AUTO: 0.01 THOUSAND/UL (ref 0–0.2)
IMM GRANULOCYTES NFR BLD AUTO: 0 % (ref 0–2)
INTERNAL QC RESULT: ABNORMAL
LYMPHOCYTES # BLD AUTO: 1.46 THOUSANDS/ÂΜL (ref 0.6–4.47)
LYMPHOCYTES NFR BLD AUTO: 38 % (ref 14–44)
MCH RBC QN AUTO: 32.6 PG (ref 26.8–34.3)
MCHC RBC AUTO-ENTMCNC: 33.6 G/DL (ref 31.4–37.4)
MCV RBC AUTO: 97 FL (ref 82–98)
MONOCYTES # BLD AUTO: 0.42 THOUSAND/ÂΜL (ref 0.17–1.22)
MONOCYTES NFR BLD AUTO: 11 % (ref 4–12)
NEUTROPHILS # BLD AUTO: 1.86 THOUSANDS/ÂΜL (ref 1.85–7.62)
NEUTS SEG NFR BLD AUTO: 47 % (ref 43–75)
NRBC BLD AUTO-RTO: 0 /100 WBCS
PLATELET # BLD AUTO: 80 THOUSANDS/UL (ref 149–390)
PMV BLD AUTO: 10.4 FL (ref 8.9–12.7)
POTASSIUM SERPL-SCNC: 3.7 MMOL/L (ref 3.5–5.3)
PROT SERPL-MCNC: 5 G/DL (ref 6.4–8.4)
RBC # BLD AUTO: 3.74 MILLION/UL (ref 3.81–5.12)
S AUREUS+CONS DNA BLD POS NAA+NON-PROBE: DETECTED
SODIUM SERPL-SCNC: 135 MMOL/L (ref 135–147)
WBC # BLD AUTO: 3.87 THOUSAND/UL (ref 4.31–10.16)

## 2024-06-09 PROCEDURE — 99232 SBSQ HOSP IP/OBS MODERATE 35: CPT | Performed by: PODIATRIST

## 2024-06-09 PROCEDURE — C9113 INJ PANTOPRAZOLE SODIUM, VIA: HCPCS | Performed by: PODIATRIST

## 2024-06-09 PROCEDURE — 85025 COMPLETE CBC W/AUTO DIFF WBC: CPT

## 2024-06-09 PROCEDURE — 99232 SBSQ HOSP IP/OBS MODERATE 35: CPT | Performed by: INTERNAL MEDICINE

## 2024-06-09 PROCEDURE — 93005 ELECTROCARDIOGRAM TRACING: CPT

## 2024-06-09 PROCEDURE — 80053 COMPREHEN METABOLIC PANEL: CPT

## 2024-06-09 RX ORDER — CALCIUM CARBONATE 500 MG/1
500 TABLET, CHEWABLE ORAL DAILY PRN
Status: DISCONTINUED | OUTPATIENT
Start: 2024-06-09 | End: 2024-06-10 | Stop reason: HOSPADM

## 2024-06-09 RX ORDER — HEPARIN SODIUM 5000 [USP'U]/ML
5000 INJECTION, SOLUTION INTRAVENOUS; SUBCUTANEOUS EVERY 8 HOURS SCHEDULED
Status: DISCONTINUED | OUTPATIENT
Start: 2024-06-09 | End: 2024-06-10 | Stop reason: HOSPADM

## 2024-06-09 RX ADMIN — FOLIC ACID 1 MG: 1 TABLET ORAL at 09:08

## 2024-06-09 RX ADMIN — OXYCODONE HYDROCHLORIDE 5 MG: 5 TABLET ORAL at 21:05

## 2024-06-09 RX ADMIN — RIFAXIMIN 550 MG: 550 TABLET ORAL at 09:08

## 2024-06-09 RX ADMIN — IPRATROPIUM BROMIDE 2 PUFF: 17 AEROSOL, METERED RESPIRATORY (INHALATION) at 21:08

## 2024-06-09 RX ADMIN — HEPARIN SODIUM 5000 UNITS: 5000 INJECTION, SOLUTION INTRAVENOUS; SUBCUTANEOUS at 21:05

## 2024-06-09 RX ADMIN — RIFAXIMIN 550 MG: 550 TABLET ORAL at 21:05

## 2024-06-09 RX ADMIN — IPRATROPIUM BROMIDE 2 PUFF: 17 AEROSOL, METERED RESPIRATORY (INHALATION) at 16:57

## 2024-06-09 RX ADMIN — IPRATROPIUM BROMIDE 2 PUFF: 17 AEROSOL, METERED RESPIRATORY (INHALATION) at 09:09

## 2024-06-09 RX ADMIN — PANTOPRAZOLE SODIUM 40 MG: 40 INJECTION, POWDER, FOR SOLUTION INTRAVENOUS at 09:04

## 2024-06-09 RX ADMIN — LACTULOSE 20 G: 20 SOLUTION ORAL at 09:04

## 2024-06-09 RX ADMIN — OXYCODONE HYDROCHLORIDE 10 MG: 10 TABLET ORAL at 12:05

## 2024-06-09 RX ADMIN — CEFTRIAXONE 1000 MG: 1 INJECTION, SOLUTION INTRAVENOUS at 23:00

## 2024-06-09 RX ADMIN — ALPRAZOLAM 0.5 MG: 0.5 TABLET ORAL at 21:05

## 2024-06-09 RX ADMIN — GABAPENTIN 300 MG: 300 CAPSULE ORAL at 21:05

## 2024-06-09 RX ADMIN — IPRATROPIUM BROMIDE 2 PUFF: 17 AEROSOL, METERED RESPIRATORY (INHALATION) at 12:42

## 2024-06-09 RX ADMIN — Medication 100 MG: at 09:08

## 2024-06-09 RX ADMIN — ALPRAZOLAM 0.5 MG: 0.5 TABLET ORAL at 09:08

## 2024-06-09 RX ADMIN — OXYCODONE HYDROCHLORIDE 10 MG: 10 TABLET ORAL at 16:58

## 2024-06-09 RX ADMIN — HEPARIN SODIUM 5000 UNITS: 5000 INJECTION, SOLUTION INTRAVENOUS; SUBCUTANEOUS at 12:47

## 2024-06-09 RX ADMIN — OXYCODONE HYDROCHLORIDE 10 MG: 10 TABLET ORAL at 06:11

## 2024-06-09 RX ADMIN — PANTOPRAZOLE SODIUM 40 MG: 40 INJECTION, POWDER, FOR SOLUTION INTRAVENOUS at 23:00

## 2024-06-09 NOTE — ASSESSMENT & PLAN NOTE
Reports that she only drinks 1 day a week, with last drink just prior to arrival to the ED   on [facility] protocol

## 2024-06-09 NOTE — PLAN OF CARE
Problem: Potential for Falls  Goal: Patient will remain free of falls  Description: INTERVENTIONS:  - Educate patient/family on patient safety including physical limitations  - Instruct patient to call for assistance with activity   - Consult OT/PT to assist with strengthening/mobility   - Keep Call bell within reach  - Keep bed low and locked with side rails adjusted as appropriate  - Keep care items and personal belongings within reach  - Initiate and maintain comfort rounds  - Make Fall Risk Sign visible to staff  - Offer Toileting every 2 Hours, in advance of need  - Initiate/Maintain alarm  - Obtain necessary fall risk management equipment:   - Apply yellow socks and bracelet for high fall risk patients  - Consider moving patient to room near nurses station  Outcome: Progressing     Problem: PAIN - ADULT  Goal: Verbalizes/displays adequate comfort level or baseline comfort level  Description: Interventions:  - Encourage patient to monitor pain and request assistance  - Assess pain using appropriate pain scale  - Administer analgesics based on type and severity of pain and evaluate response  - Implement non-pharmacological measures as appropriate and evaluate response  - Consider cultural and social influences on pain and pain management  - Notify physician/advanced practitioner if interventions unsuccessful or patient reports new pain  Outcome: Progressing     Problem: INFECTION - ADULT  Goal: Absence or prevention of progression during hospitalization  Description: INTERVENTIONS:  - Assess and monitor for signs and symptoms of infection  - Monitor lab/diagnostic results  - Monitor all insertion sites, i.e. indwelling lines, tubes, and drains  - Monitor endotracheal if appropriate and nasal secretions for changes in amount and color  - [place] appropriate cooling/warming therapies per order  - Administer medications as ordered  - Instruct and encourage patient and family to use good hand hygiene  technique  - Identify and instruct in appropriate isolation precautions for identified infection/condition  Outcome: Progressing  Goal: Absence of fever/infection during neutropenic period  Description: INTERVENTIONS:  - Monitor WBC    Outcome: Progressing     Problem: SAFETY ADULT  Goal: Patient will remain free of falls  Description: INTERVENTIONS:  - Educate patient/family on patient safety including physical limitations  - Instruct patient to call for assistance with activity   - Consult OT/PT to assist with strengthening/mobility   - Keep Call bell within reach  - Keep bed low and locked with side rails adjusted as appropriate  - Keep care items and personal belongings within reach  - Initiate and maintain comfort rounds  - Make Fall Risk Sign visible to staff  - Offer Toileting every 2 Hours, in advance of need  - Initiate/Maintain alarm  - Obtain necessary fall risk management equipment:   - Apply yellow socks and bracelet for high fall risk patients  - Consider moving patient to room near nurses station  Outcome: Progressing  Goal: Maintain or return to baseline ADL function  Description: INTERVENTIONS:  - Educate patient/family on patient safety including physical limitations  - Instruct patient to call for assistance with activity   - Consult OT/PT to assist with strengthening/mobility   - Keep Call bell within reach  - Keep bed low and locked with side rails adjusted as appropriate  - Keep care items and personal belongings within reach  - Initiate and maintain comfort rounds  - Make Fall Risk Sign visible to staff  - Offer Toileting every 2 Hours, in advance of need  - Initiate/Maintain alarm  - Obtain necessary fall risk management equipment:   - Apply yellow socks and bracelet for high fall risk patients  - Consider moving patient to room near nurses station  Outcome: Progressing  Goal: Maintains/Returns to pre admission functional level  Description: INTERVENTIONS:  - Perform AM-PAC 6 Click Basic  Mobility/ Daily Activity assessment daily.  - Set and communicate daily mobility goal to care team and patient/family/caregiver.   - Collaborate with rehabilitation services on mobility goals if consulted  - Perform Range of Motion 6 times a day.  - Reposition patient every 2 hours.  - Dangle patient 4 times a day  - Stand patient 4 times a day  - Ambulate patient 4 times a day  - Out of bed to chair 3 times a day   - Out of bed for meals 3 times a day  - Out of bed for toileting  - Record patient progress and toleration of activity level   Outcome: Progressing     Problem: DISCHARGE PLANNING  Goal: Discharge to home or other facility with appropriate resources  Description: INTERVENTIONS:  - Identify barriers to discharge w/patient and caregiver  - Arrange for needed discharge resources and transportation as appropriate  - Identify discharge learning needs (meds, wound care, etc.)  - Arrange for interpretive services to assist at discharge as needed  - Refer to Case Management Department for coordinating discharge planning if the patient needs post-hospital services based on physician/advanced practitioner order or complex needs related to functional status, cognitive ability, or social support system  Outcome: Progressing     Problem: Knowledge Deficit  Goal: Patient/family/caregiver demonstrates understanding of disease process, treatment plan, medications, and discharge instructions  Description: Complete learning assessment and assess knowledge base.  Interventions:  - Provide teaching at level of understanding  - Provide teaching via preferred learning methods  Outcome: Progressing     Problem: Prexisting or High Potential for Compromised Skin Integrity  Goal: Skin integrity is maintained or improved  Description: INTERVENTIONS:  - Identify patients at risk for skin breakdown  - Assess and monitor skin integrity  - Assess and monitor nutrition and hydration status  - Monitor labs   - Assess for incontinence    - Turn and reposition patient  - Assist with mobility/ambulation  - Relieve pressure over bony prominences  - Avoid friction and shearing  - Provide appropriate hygiene as needed including keeping skin clean and dry  - Evaluate need for skin moisturizer/barrier cream  - Collaborate with interdisciplinary team   - Patient/family teaching  - Consider wound care consult   Outcome: Progressing

## 2024-06-09 NOTE — DISCHARGE INSTR - AVS FIRST PAGE
Right foot   keep dressing dry clean and intact, it is to be changed with Adaptic dry dressing 3 times a week.  Maintain use of Darco wedge shoe, may heel touch weight-bear with use of walker.  Please use a cast cover to shower, do not get foot wet.  Follow-up with Dr. Baldwin or Dr. Hernandez within 1 week of discharge.

## 2024-06-09 NOTE — ASSESSMENT & PLAN NOTE
1 out of 2 blood culture results 6/7 revealing Staphylococcus  Repeat cultures no growth at 48 hours  Most likely contaminant

## 2024-06-09 NOTE — PROGRESS NOTES
Finn Oleary is a 68 y.o. female who is currently ordered Vancomycin IV with management by the Pharmacy Consult service.  Relevant clinical data and objective / subjective history reviewed.  Vancomycin Assessment:  Indication and Goal AUC/Trough: Bone/joint infection (goal -600, trough >10), -600, trough >10  Clinical Status: stable  Micro:     Renal Function:  SCr: 0.77 mg/dL  CrCl: 57 mL/min  Renal replacement: Not on dialysis  Days of Therapy: 4  Current Dose: 1000mg q24h  Vancomycin Plan:  New Dosing: no change  Estimated AUC: 421 mcg*hr/mL  Estimated Trough: 9.8 mcg/mL  Next Level: 0600 on 6/15  Renal Function Monitoring: Daily BMP and UOP  Pharmacy will continue to follow closely for s/sx of nephrotoxicity, infusion reactions and appropriateness of therapy.  BMP and CBC will be ordered per protocol. We will continue to follow the patient’s culture results and clinical progress daily.    David Perez, Pharmacist

## 2024-06-09 NOTE — ASSESSMENT & PLAN NOTE
Endorses right great toe pain and swelling for at least the last few days, however she is unclear if true time of onset  Deep ulcer noted to right great toe, concern for osteomyelitis-some questionable cortical disruption on x-ray  CRP 4.7, ESR 2  MRI right foot consistent with osteomyelitis of the tuft of first distal phalanx  POD #1 right great toe amputation by podiatry. Dressing C/D/I.   Infection disease was consulted  Continue ceftriaxone and vancomycin  Patient will need PT/OT

## 2024-06-09 NOTE — PROGRESS NOTES
Critical access hospital  Progress Note  Name: Finn Oleary I  MRN: [id number]  Unit/Bed#: -[facility] I Date of Admission: 6/5/2024   Date of Service: 6/9/2024 I Hospital Day: 3    Assessment & Plan   * Osteomyelitis of great toe of right foot (HCC)  Assessment & Plan  Endorses right great toe pain and swelling for at least the last few days, however she is unclear if true time of onset  Deep ulcer noted to right great toe, concern for osteomyelitis-some questionable cortical disruption on x-ray  CRP 4.7, ESR 2  MRI right foot consistent with osteomyelitis of the tuft of first distal phalanx  POD #1 right great toe amputation by podiatry. Dressing C/D/I.   Infection disease was consulted  Continue ceftriaxone and vancomycin  Patient will need PT/OT    COPD (chronic obstructive pulmonary disease) ([facility])  Assessment & Plan  Continue inhalers.  Stable    Positive blood culture  Assessment & Plan  1 out of 2 blood culture results 6/7 revealing Staphylococcus  Repeat cultures no growth at 48 hours  Most likely contaminant    Elevated TSH  Assessment & Plan  TSH slightly elevated at 5.72  Free T4: 0.96- normal  Recommend follow up blood work with PCP in 6 weeks after discharge for ongoing monitoring    Thrombocytopenia (HCC)  Assessment & Plan  Recent Labs     06/07/24  0256 06/08/24  0225 06/09/24  0418   PLT 96* 93* 80*      Platelets 119 K on admit  Secondary to cirrhosis of the liver  start dvt prophylaxis with heparin   Would not dc unless Pl are < 50.000    Metabolic encephalopathy  Assessment & Plan  Slow to respond and some word finding difficulty, though alert and oriented x 4  CTh without acute abnormality  Ammonia level 80, and patient admits to noncompliance with lactulose  Suspect metabolic encephalopathy multifactorial in setting of acute alcohol intoxication, hepatic encephalopathy, as well as acute infection  Was started on lactulose with goal of 3-4 BM daily  Answering questions  appropriately today. Continue to monitor closely    Alcohol abuse  Assessment & Plan  Reports that she only drinks 1 day a week, with last drink just prior to arrival to the ED   on CIWA protocol    Generalized anxiety disorder  Assessment & Plan  Maintained on Xanax 0.5 Mg twice daily with additional as needed dose daily  PDMP reviewed, continue home Xanax dosing    Tobacco use disorder  Assessment & Plan  Admits to smoking half a pack per day  NRT while inpatient    Alcoholic cirrhosis (HCC)  Assessment & Plan  Longstanding history of alcoholic cirrhosis complicated by ascites, esophageal varices, and hepatic encephalopathy in the past  Decompensated in the setting of hepatic encephalopathy currently, but no evidence of ascites and labs are not overtly abnormal with mildly elevated T. bili at 1.65, no ascites, and only slight elevation of coags with INR at 1.21 and PT at 15.8  GI consulted  Continue lactulose  Continue Lasix and spironolactone  Continue rifaximin          VTE Pharmacologic Prophylaxis: VTE Score: 3 Moderate Risk (Score 3-4) - Pharmacological DVT Prophylaxis Ordered: heparin.    Mobility:   Basic Mobility Inpatient Raw Score: 17  JH-HLM Goal: 5: Stand one or more mins  JH-HLM Achieved: 5: Stand (1 or more minutes)  JH-HLM Goal NOT achieved. Continue with multidisciplinary rounding and encourage appropriate mobility to improve upon JH-HLM goals.    Patient Centered Rounds: I performed bedside rounds with nursing staff today.   Discussions with Specialists or Other Care Team Provider:     Education and Discussions with Family / Patient: Updated  (son) via phone.    Total Time Spent on Date of Encounter in care of patient: 38 mins. This time was spent on one or more of the following: performing physical exam; counseling and coordination of care; obtaining or reviewing history; documenting in the medical record; reviewing/ordering tests, medications or procedures; communicating with other  healthcare professionals and discussing with patient's family/caregivers.    Current Length of Stay: 3 day(s)  Current Patient Status: Inpatient   Certification Statement: The patient will continue to require additional inpatient hospital stay due to status post osteomyelitis and amputation  Discharge Plan: Anticipate discharge in 48-72 hrs to tbd    Code Status: Level 1 - Full Code    Subjective:     Had some chest discomfort this morning likely musculoskeletal versus GERD as she had a lot of burping.  Reported pain in her foot and that she cannot walk.  No shortness of breath, no nausea or vomiting.  Is having bowel movements    Objective:     Vitals:   Temp (24hrs), Av.8 °F (36 °C), Min:96.6 °F (35.9 °C), Max:97 °F (36.1 °C)    Temp:  [96.6 °F (35.9 °C)-97 °F (36.1 °C)] 96.8 °F (36 °C)  HR:  [56-66] 66  BP: (102-149)/(52-66) 102/52  SpO2:  [88 %-95 %] 88 %  Body mass index is 21.72 kg/m².     Input and Output Summary (last 24 hours):     Intake/Output Summary (Last 24 hours) at 2024 1227  Last data filed at 2024 1216  Gross per 24 hour   Intake 1040 ml   Output 1000 ml   Net 40 ml       Physical Exam:   Physical Exam  Vitals and nursing note reviewed.   Constitutional:       General: She is not in acute distress.     Appearance: She is not diaphoretic.   HENT:      Head: Normocephalic.   Eyes:      General: No scleral icterus.        Right eye: No discharge.         Left eye: No discharge.   Cardiovascular:      Rate and Rhythm: Normal rate and regular rhythm.   Pulmonary:      Effort: Pulmonary effort is normal. No respiratory distress.      Breath sounds: No wheezing, rhonchi or rales.   Abdominal:      General: There is no distension.      Palpations: Abdomen is soft.      Tenderness: There is no abdominal tenderness. There is no guarding or rebound.   Musculoskeletal:      Cervical back: Normal range of motion.      Right lower leg: No edema.      Left lower leg: No edema.   Skin:     General:  Skin is warm.      Coloration: Skin is pale.   Neurological:      Mental Status: She is alert and oriented to person, place, and time.   Psychiatric:         Mood and Affect: Mood normal.         Behavior: Behavior normal.          Additional Data:     Labs:  Results from last 7 days   Lab Units 06/09/24  0418   WBC Thousand/uL 3.87*   HEMOGLOBIN g/dL 12.2   HEMATOCRIT % 36.3   PLATELETS Thousands/uL 80*   SEGS PCT % 47   LYMPHO PCT % 38   MONO PCT % 11   EOS PCT % 3     Results from last 7 days   Lab Units 06/09/24  0418   SODIUM mmol/L 135   POTASSIUM mmol/L 3.7   CHLORIDE mmol/L 99   CO2 mmol/L 33*   BUN mg/dL 11   CREATININE mg/dL 0.77   ANION GAP mmol/L 3*   CALCIUM mg/dL 8.7   ALBUMIN g/dL 2.5*   TOTAL BILIRUBIN mg/dL 1.11*   ALK PHOS U/L 100   ALT U/L 9   AST U/L 26   GLUCOSE RANDOM mg/dL 102     Results from last 7 days   Lab Units 06/06/24  0517   INR  1.36*             Results from last 7 days   Lab Units 06/05/24  2229   LACTIC ACID mmol/L 1.7   PROCALCITONIN ng/ml <0.05       Lines/Drains:  Invasive Devices       Peripheral Intravenous Line  Duration             Peripheral IV 06/08/24 Dorsal (posterior);Left Forearm <1 day                          Imaging: No pertinent imaging reviewed.    Recent Cultures (last 7 days):   Results from last 7 days   Lab Units 06/07/24  0540 06/05/24  2229   BLOOD CULTURE  No Growth at 48 hrs.  No Growth at 48 hrs. Staphylococcus coagulase negative*  No Growth at 72 hrs.   GRAM STAIN RESULT   --  Gram positive cocci in clusters*       Last 24 Hours Medication List:   Current Facility-Administered Medications   Medication Dose Route Frequency Provider Last Rate    acetaminophen  650 mg Oral Q8H PRN Niru Nicolasa, DPM      albuterol  2 puff Inhalation Q4H PRN Edna Hua PA-C      ALPRAZolam  0.5 mg Oral BID Niru Nicolasa, DPM      ALPRAZolam  0.5 mg Oral Daily PRN Niru Nicolasa, DPM      calcium carbonate  500 mg Oral Daily PRN Danielle Zelaya MD       cefTRIAXone  1,000 mg Intravenous Q24H Niru Nicolasa, DPM 1,000 mg (06/08/24 2053)    folic acid  1 mg Oral Daily Niru Nicolasa, DPM      furosemide  20 mg Oral Daily Niru Nicolasa, DPM      gabapentin  300 mg Oral HS Niru Nicolasa, DPM      heparin (porcine)  5,000 Units Subcutaneous Q8H [facility] Danielle Zelaya MD      ipratropium  2 puff Inhalation 4x Daily Edna Hua PA-C      lactulose  20 g Oral TID Niru Nicolasa, DPM      metoprolol succinate  12.5 mg Oral Daily Niru Nicolasa, DPM      nicotine  1 patch Transdermal Daily Niru Nicolasa, DPM      ondansetron  4 mg Intravenous Q6H PRN Niru Nicolasa, DPM      oxyCODONE  10 mg Oral Q4H PRN Lilian Leyva PA-C      oxyCODONE  5 mg Oral Q4H PRN Lilian Leyva PA-C      pantoprazole  40 mg Intravenous Q12H [facility] Niru Nicolasa, DPM      rifaximin  550 mg Oral Q12H [facility] Niru Nicolasa, DPM      spironolactone  50 mg Oral Daily Niru Nicolasa, DPM      thiamine  100 mg Oral Daily Niru Nicolasa, DPM      vancomycin  17.5 mg/kg Intravenous Q24H Cassandra Parmar PA-C 1,000 mg (06/08/24 2206)        Today, Patient Was Seen By: Danielle Zelaya MD    **Please Note: This note may have been constructed using a voice recognition system.**

## 2024-06-09 NOTE — PLAN OF CARE
Problem: Potential for Falls  Goal: Patient will remain free of falls  Description: INTERVENTIONS:  - Educate patient/family on patient safety including physical limitations  - Instruct patient to call for assistance with activity   - Consult OT/PT to assist with strengthening/mobility   - Keep Call bell within reach  - Keep bed low and locked with side rails adjusted as appropriate  - Keep care items and personal belongings within reach  - Initiate and maintain comfort rounds  - Make Fall Risk Sign visible to staff  - Offer Toileting every 2 Hours, in advance of need  - Initiate/Maintain alarm  - Obtain necessary fall risk management equipment:   - Apply yellow socks and bracelet for high fall risk patients  - Consider moving patient to room near nurses station  Outcome: Progressing     Problem: PAIN - ADULT  Goal: Verbalizes/displays adequate comfort level or baseline comfort level  Description: Interventions:  - Encourage patient to monitor pain and request assistance  - Assess pain using appropriate pain scale  - Administer analgesics based on type and severity of pain and evaluate response  - Implement non-pharmacological measures as appropriate and evaluate response  - Consider cultural and social influences on pain and pain management  - Notify physician/advanced practitioner if interventions unsuccessful or patient reports new pain  Outcome: Progressing     Problem: INFECTION - ADULT  Goal: Absence or prevention of progression during hospitalization  Description: INTERVENTIONS:  - Assess and monitor for signs and symptoms of infection  - Monitor lab/diagnostic results  - Monitor all insertion sites, i.e. indwelling lines, tubes, and drains  - Monitor endotracheal if appropriate and nasal secretions for changes in amount and color  - [place] appropriate cooling/warming therapies per order  - Administer medications as ordered  - Instruct and encourage patient and family to use good hand hygiene  technique  - Identify and instruct in appropriate isolation precautions for identified infection/condition  Outcome: Progressing  Goal: Absence of fever/infection during neutropenic period  Description: INTERVENTIONS:  - Monitor WBC    Outcome: Progressing     Problem: SAFETY ADULT  Goal: Patient will remain free of falls  Description: INTERVENTIONS:  - Educate patient/family on patient safety including physical limitations  - Instruct patient to call for assistance with activity   - Consult OT/PT to assist with strengthening/mobility   - Keep Call bell within reach  - Keep bed low and locked with side rails adjusted as appropriate  - Keep care items and personal belongings within reach  - Initiate and maintain comfort rounds  - Make Fall Risk Sign visible to staff  - Offer Toileting every 2 Hours, in advance of need  - Initiate/Maintain alarm  - Obtain necessary fall risk management equipment:   - Apply yellow socks and bracelet for high fall risk patients  - Consider moving patient to room near nurses station  Outcome: Progressing  Goal: Maintain or return to baseline ADL function  Description: INTERVENTIONS:  - Educate patient/family on patient safety including physical limitations  - Instruct patient to call for assistance with activity   - Consult OT/PT to assist with strengthening/mobility   - Keep Call bell within reach  - Keep bed low and locked with side rails adjusted as appropriate  - Keep care items and personal belongings within reach  - Initiate and maintain comfort rounds  - Make Fall Risk Sign visible to staff  - Offer Toileting every 2 Hours, in advance of need  - Initiate/Maintain alarm  - Obtain necessary fall risk management equipment:   - Apply yellow socks and bracelet for high fall risk patients  - Consider moving patient to room near nurses station  Outcome: Progressing  Goal: Maintains/Returns to pre admission functional level  Description: INTERVENTIONS:  - Perform AM-PAC 6 Click Basic  Mobility/ Daily Activity assessment daily.  - Set and communicate daily mobility goal to care team and patient/family/caregiver.   - Collaborate with rehabilitation services on mobility goals if consulted  - Perform Range of Motion x times a day.  - Reposition patient every x hours.  - Dangle patient x times a day  - Stand patient x times a day  - Ambulate patient x times a day  - Out of bed to chair x times a day   - Out of bed for meals x times a day  - Out of bed for toileting  - Record patient progress and toleration of activity level   Outcome: Progressing     Problem: DISCHARGE PLANNING  Goal: Discharge to home or other facility with appropriate resources  Description: INTERVENTIONS:  - Identify barriers to discharge w/patient and caregiver  - Arrange for needed discharge resources and transportation as appropriate  - Identify discharge learning needs (meds, wound care, etc.)  - Arrange for interpretive services to assist at discharge as needed  - Refer to Case Management Department for coordinating discharge planning if the patient needs post-hospital services based on physician/advanced practitioner order or complex needs related to functional status, cognitive ability, or social support system  Outcome: Progressing     Problem: Knowledge Deficit  Goal: Patient/family/caregiver demonstrates understanding of disease process, treatment plan, medications, and discharge instructions  Description: Complete learning assessment and assess knowledge base.  Interventions:  - Provide teaching at level of understanding  - Provide teaching via preferred learning methods  Outcome: Progressing     Problem: Prexisting or High Potential for Compromised Skin Integrity  Goal: Skin integrity is maintained or improved  Description: INTERVENTIONS:  - Identify patients at risk for skin breakdown  - Assess and monitor skin integrity  - Assess and monitor nutrition and hydration status  - Monitor labs   - Assess for incontinence    - Turn and reposition patient  - Assist with mobility/ambulation  - Relieve pressure over bony prominences  - Avoid friction and shearing  - Provide appropriate hygiene as needed including keeping skin clean and dry  - Evaluate need for skin moisturizer/barrier cream  - Collaborate with interdisciplinary team   - Patient/family teaching  - Consider wound care consult   Outcome: Progressing

## 2024-06-09 NOTE — ASSESSMENT & PLAN NOTE
Recent Labs     06/07/24  0256 06/08/24  0225 06/09/24  0418   PLT 96* 93* 80*      Platelets 119 K on admit  Secondary to cirrhosis of the liver  start dvt prophylaxis with heparin   Would not dc unless Pl are < 50.000

## 2024-06-09 NOTE — PROGRESS NOTES
[facility] Podiatry - Progress Note  Patient: Finn Oleary 68 y.o. female   MRN: [id number]  PCP: Scot Brink MD  Unit/Bed#: -01 Encounter: [id number]  Date Of Visit: 24    ASSESSMENT:    Finn Oleary is a 68 y.o. female with:    Status post day 2 partial right hallux amputation for osteomyelitis of distal phalanx right great toe.  Alcoholic peripheral neuropathy  Metabolic encephalopathy, alcohol sclerosis, thrombocytopenia, elevated TSH, alcohol abuse, generalized anxiety disorder, tobacco use disorder    PLAN:    Post-surgical dressing changed today with adaptic DSD.  Vitals signs stable. Patient afebrile with no leukocytosis. No erythema, edema, or lymphangitis noted either proximal or distal to the dressings.  Pain is well controlled. Continue current pain management regimen.  Continue partial weight bearing to affected extremity with elevation while non-ambulatory.  IV abx per primary team  Patient stable for discharge tomorrow after PT evaluation to maintain heel touch WB with darco wedge shoe right with walker.  Rest of care per primary team.      SUBJECTIVE:     The patient was seen, evaluated, and assessed at bedside today. The patient was awake, alert, and in no acute distress. The patient reports some pain at this time. Pain is well controlled with current pain management regimen. Patient reports normal appetite and bowel movement. Patient denies N/V/F/chills/SOB/CP.      OBJECTIVE:     Vitals:   /52   Pulse 66   Temp (!) 96.8 °F (36 °C)   Resp 12   Wt 55.6 kg (122 lb 9.6 oz)   LMP  (LMP Unknown)   SpO2 (!) 88%   BMI 21.72 kg/m²     Temp (24hrs), Av.8 °F (36 °C), Min:96.6 °F (35.9 °C), Max:97 °F (36.1 °C)      Dressings on affected extremity: C/D/I    Physical Exam  Cardiovascular status at baseline. Neurological status at baseline. Normal post op  erythema, edema noted no  lymphangitis noted. Right partial hallux amputation site incision is coaoting well with no signs  "of infection or dehiscence.                Additional Data:     Labs:    Results from last 7 days   Lab Units 06/09/24  0418   WBC Thousand/uL 3.87*   HEMOGLOBIN g/dL 12.2   HEMATOCRIT % 36.3   PLATELETS Thousands/uL 80*   SEGS PCT % 47   LYMPHO PCT % 38   MONO PCT % 11   EOS PCT % 3     Results from last 7 days   Lab Units 06/09/24  0418   POTASSIUM mmol/L 3.7   CHLORIDE mmol/L 99   CO2 mmol/L 33*   BUN mg/dL 11   CREATININE mg/dL 0.77   CALCIUM mg/dL 8.7   ALK PHOS U/L 100   ALT U/L 9   AST U/L 26     Results from last 7 days   Lab Units 06/06/24  0517   INR  1.36*       * I Have Reviewed All Lab Data Listed Above.    Recent Cultures (last 7 days):     Results from last 7 days   Lab Units 06/07/24  0540 06/05/24  2229   BLOOD CULTURE  No Growth at 48 hrs.  No Growth at 48 hrs. Staphylococcus coagulase negative*  No Growth at 72 hrs.   GRAM STAIN RESULT   --  Gram positive cocci in clusters*           Imaging: I have personally reviewed pertinent post-operative films in PACS:  EKG, Pathology, and Other Studies: I have personally reviewed pertinent reports.      ** Please Note: Portions of the record may have been created with voice recognition software. Occasional wrong word or \"sound a like\" substitutions may have occurred due to the inherent limitations of voice recognition software. Read the chart carefully and recognize, using context, where substitutions have occurred. **     "

## 2024-06-09 NOTE — ASSESSMENT & PLAN NOTE
Longstanding history of alcoholic cirrhosis complicated by ascites, esophageal varices, and hepatic encephalopathy in the past  Decompensated in the setting of hepatic encephalopathy currently, but no evidence of ascites and labs are not overtly abnormal with mildly elevated T. bili at 1.65, no ascites, and only slight elevation of coags with INR at 1.21 and PT at 15.8  GI consulted  Continue lactulose  Continue Lasix and spironolactone  Continue rifaximin

## 2024-06-10 VITALS
TEMPERATURE: 97 F | WEIGHT: 122.6 LBS | BODY MASS INDEX: 21.72 KG/M2 | HEART RATE: 68 BPM | RESPIRATION RATE: 16 BRPM | DIASTOLIC BLOOD PRESSURE: 60 MMHG | OXYGEN SATURATION: 93 % | SYSTOLIC BLOOD PRESSURE: 118 MMHG

## 2024-06-10 LAB
ALBUMIN SERPL BCP-MCNC: 2.6 G/DL (ref 3.5–5)
ALP SERPL-CCNC: 114 U/L (ref 34–104)
ALT SERPL W P-5'-P-CCNC: 10 U/L (ref 7–52)
ANION GAP SERPL CALCULATED.3IONS-SCNC: 3 MMOL/L (ref 4–13)
AST SERPL W P-5'-P-CCNC: 28 U/L (ref 13–39)
ATRIAL RATE: 59 BPM
BASOPHILS # BLD AUTO: 0.02 THOUSANDS/ÂΜL (ref 0–0.1)
BASOPHILS NFR BLD AUTO: 1 % (ref 0–1)
BILIRUB SERPL-MCNC: 0.96 MG/DL (ref 0.2–1)
BUN SERPL-MCNC: 12 MG/DL (ref 5–25)
CALCIUM ALBUM COR SERPL-MCNC: 9.8 MG/DL (ref 8.3–10.1)
CALCIUM SERPL-MCNC: 8.7 MG/DL (ref 8.4–10.2)
CHLORIDE SERPL-SCNC: 100 MMOL/L (ref 96–108)
CO2 SERPL-SCNC: 31 MMOL/L (ref 21–32)
CREAT SERPL-MCNC: 0.78 MG/DL (ref 0.6–1.3)
EOSINOPHIL # BLD AUTO: 0.15 THOUSAND/ÂΜL (ref 0–0.61)
EOSINOPHIL NFR BLD AUTO: 4 % (ref 0–6)
ERYTHROCYTE [DISTWIDTH] IN BLOOD BY AUTOMATED COUNT: 12.6 % (ref 11.6–15.1)
GFR SERPL CREATININE-BSD FRML MDRD: 77 ML/MIN/1.73SQ M
GLUCOSE SERPL-MCNC: 86 MG/DL (ref 65–140)
HCT VFR BLD AUTO: 37.5 % (ref 34.8–46.1)
HGB BLD-MCNC: 12.6 G/DL (ref 11.5–15.4)
IMM GRANULOCYTES # BLD AUTO: 0.02 THOUSAND/UL (ref 0–0.2)
IMM GRANULOCYTES NFR BLD AUTO: 1 % (ref 0–2)
LYMPHOCYTES # BLD AUTO: 1.63 THOUSANDS/ÂΜL (ref 0.6–4.47)
LYMPHOCYTES NFR BLD AUTO: 42 % (ref 14–44)
MCH RBC QN AUTO: 33.1 PG (ref 26.8–34.3)
MCHC RBC AUTO-ENTMCNC: 33.6 G/DL (ref 31.4–37.4)
MCV RBC AUTO: 98 FL (ref 82–98)
MONOCYTES # BLD AUTO: 0.42 THOUSAND/ÂΜL (ref 0.17–1.22)
MONOCYTES NFR BLD AUTO: 11 % (ref 4–12)
NEUTROPHILS # BLD AUTO: 1.54 THOUSANDS/ÂΜL (ref 1.85–7.62)
NEUTS SEG NFR BLD AUTO: 41 % (ref 43–75)
NRBC BLD AUTO-RTO: 0 /100 WBCS
P AXIS: 38 DEGREES
PLATELET # BLD AUTO: 84 THOUSANDS/UL (ref 149–390)
PLATELET BLD QL SMEAR: ABNORMAL
PMV BLD AUTO: 11.4 FL (ref 8.9–12.7)
POTASSIUM SERPL-SCNC: 3.5 MMOL/L (ref 3.5–5.3)
PR INTERVAL: 128 MS
PROT SERPL-MCNC: 5.4 G/DL (ref 6.4–8.4)
QRS AXIS: 62 DEGREES
QRSD INTERVAL: 80 MS
QT INTERVAL: 430 MS
QTC INTERVAL: 425 MS
RBC # BLD AUTO: 3.81 MILLION/UL (ref 3.81–5.12)
RBC MORPH BLD: NORMAL
SODIUM SERPL-SCNC: 134 MMOL/L (ref 135–147)
T WAVE AXIS: 18 DEGREES
VENTRICULAR RATE: 59 BPM
WBC # BLD AUTO: 3.78 THOUSAND/UL (ref 4.31–10.16)

## 2024-06-10 PROCEDURE — 85025 COMPLETE CBC W/AUTO DIFF WBC: CPT | Performed by: INTERNAL MEDICINE

## 2024-06-10 PROCEDURE — 97530 THERAPEUTIC ACTIVITIES: CPT

## 2024-06-10 PROCEDURE — C9113 INJ PANTOPRAZOLE SODIUM, VIA: HCPCS | Performed by: PODIATRIST

## 2024-06-10 PROCEDURE — 97163 PT EVAL HIGH COMPLEX 45 MIN: CPT

## 2024-06-10 PROCEDURE — 97167 OT EVAL HIGH COMPLEX 60 MIN: CPT

## 2024-06-10 PROCEDURE — 80053 COMPREHEN METABOLIC PANEL: CPT | Performed by: INTERNAL MEDICINE

## 2024-06-10 PROCEDURE — 99239 HOSP IP/OBS DSCHRG MGMT >30: CPT | Performed by: PHYSICIAN ASSISTANT

## 2024-06-10 PROCEDURE — 97535 SELF CARE MNGMENT TRAINING: CPT

## 2024-06-10 PROCEDURE — 93010 ELECTROCARDIOGRAM REPORT: CPT | Performed by: INTERNAL MEDICINE

## 2024-06-10 PROCEDURE — NC001 PR NO CHARGE: Performed by: PHYSICIAN ASSISTANT

## 2024-06-10 RX ORDER — GABAPENTIN 300 MG/1
300 CAPSULE ORAL 2 TIMES DAILY
Qty: 60 CAPSULE | Refills: 0 | Status: SHIPPED | OUTPATIENT
Start: 2024-06-10

## 2024-06-10 RX ORDER — ACETAMINOPHEN 325 MG/1
975 TABLET ORAL EVERY 8 HOURS SCHEDULED
Status: DISCONTINUED | OUTPATIENT
Start: 2024-06-10 | End: 2024-06-10 | Stop reason: HOSPADM

## 2024-06-10 RX ORDER — PANTOPRAZOLE SODIUM 40 MG/1
40 TABLET, DELAYED RELEASE ORAL
Status: DISCONTINUED | OUTPATIENT
Start: 2024-06-10 | End: 2024-06-10 | Stop reason: HOSPADM

## 2024-06-10 RX ORDER — OXYCODONE HYDROCHLORIDE 5 MG/1
5 TABLET ORAL EVERY 4 HOURS PRN
Qty: 20 TABLET | Refills: 0 | Status: SHIPPED | OUTPATIENT
Start: 2024-06-10 | End: 2024-06-21

## 2024-06-10 RX ORDER — MECLIZINE HCL 12.5 MG/1
12.5 TABLET ORAL EVERY 8 HOURS PRN
Status: DISCONTINUED | OUTPATIENT
Start: 2024-06-10 | End: 2024-06-10 | Stop reason: HOSPADM

## 2024-06-10 RX ORDER — CEFDINIR 300 MG/1
300 CAPSULE ORAL EVERY 12 HOURS SCHEDULED
Qty: 10 CAPSULE | Refills: 0 | Status: SHIPPED | OUTPATIENT
Start: 2024-06-10 | End: 2024-06-15

## 2024-06-10 RX ORDER — GABAPENTIN 300 MG/1
300 CAPSULE ORAL 3 TIMES DAILY
Status: DISCONTINUED | OUTPATIENT
Start: 2024-06-10 | End: 2024-06-10 | Stop reason: HOSPADM

## 2024-06-10 RX ORDER — DOXYCYCLINE 100 MG/1
100 TABLET ORAL 2 TIMES DAILY
Qty: 60 TABLET | Refills: 0 | Status: SHIPPED | OUTPATIENT
Start: 2024-06-10 | End: 2024-07-10

## 2024-06-10 RX ORDER — NICOTINE 21 MG/24HR
1 PATCH, TRANSDERMAL 24 HOURS TRANSDERMAL DAILY
Qty: 28 PATCH | Refills: 0 | Status: SHIPPED | OUTPATIENT
Start: 2024-06-11

## 2024-06-10 RX ORDER — LACTULOSE 10 G/15ML
20 SOLUTION ORAL 3 TIMES DAILY
Qty: 240 ML | Refills: 0 | Status: SHIPPED | OUTPATIENT
Start: 2024-06-10

## 2024-06-10 RX ORDER — ACETAMINOPHEN 325 MG/1
975 TABLET ORAL EVERY 8 HOURS SCHEDULED
Qty: 63 TABLET | Refills: 0 | Status: SHIPPED | OUTPATIENT
Start: 2024-06-10 | End: 2024-06-17

## 2024-06-10 RX ORDER — MECLIZINE HCL 12.5 MG/1
12.5 TABLET ORAL EVERY 8 HOURS PRN
Qty: 30 TABLET | Refills: 0 | Status: SHIPPED | OUTPATIENT
Start: 2024-06-10

## 2024-06-10 RX ADMIN — PANTOPRAZOLE SODIUM 40 MG: 40 INJECTION, POWDER, FOR SOLUTION INTRAVENOUS at 08:16

## 2024-06-10 RX ADMIN — LACTULOSE 20 G: 20 SOLUTION ORAL at 08:16

## 2024-06-10 RX ADMIN — HEPARIN SODIUM 5000 UNITS: 5000 INJECTION, SOLUTION INTRAVENOUS; SUBCUTANEOUS at 06:15

## 2024-06-10 RX ADMIN — HEPARIN SODIUM 5000 UNITS: 5000 INJECTION, SOLUTION INTRAVENOUS; SUBCUTANEOUS at 13:29

## 2024-06-10 RX ADMIN — GABAPENTIN 300 MG: 300 CAPSULE ORAL at 15:12

## 2024-06-10 RX ADMIN — PANTOPRAZOLE SODIUM 40 MG: 40 TABLET, DELAYED RELEASE ORAL at 15:12

## 2024-06-10 RX ADMIN — Medication 100 MG: at 08:16

## 2024-06-10 RX ADMIN — RIFAXIMIN 550 MG: 550 TABLET ORAL at 08:16

## 2024-06-10 RX ADMIN — ALPRAZOLAM 0.5 MG: 0.5 TABLET ORAL at 08:16

## 2024-06-10 RX ADMIN — OXYCODONE HYDROCHLORIDE 10 MG: 10 TABLET ORAL at 14:19

## 2024-06-10 RX ADMIN — OXYCODONE HYDROCHLORIDE 10 MG: 10 TABLET ORAL at 09:47

## 2024-06-10 RX ADMIN — FOLIC ACID 1 MG: 1 TABLET ORAL at 08:16

## 2024-06-10 RX ADMIN — OXYCODONE HYDROCHLORIDE 10 MG: 10 TABLET ORAL at 05:34

## 2024-06-10 RX ADMIN — CARBAMIDE PEROXIDE 5 DROP: 65 SOLUTION/ DROPS TOPICAL at 10:59

## 2024-06-10 RX ADMIN — IPRATROPIUM BROMIDE 2 PUFF: 17 AEROSOL, METERED RESPIRATORY (INHALATION) at 08:17

## 2024-06-10 RX ADMIN — IPRATROPIUM BROMIDE 2 PUFF: 17 AEROSOL, METERED RESPIRATORY (INHALATION) at 17:10

## 2024-06-10 RX ADMIN — CALCIUM CARBONATE (ANTACID) CHEW TAB 500 MG 500 MG: 500 CHEW TAB at 09:52

## 2024-06-10 RX ADMIN — OXYCODONE HYDROCHLORIDE 10 MG: 10 TABLET ORAL at 01:06

## 2024-06-10 RX ADMIN — VANCOMYCIN HYDROCHLORIDE 1000 MG: 1 INJECTION, SOLUTION INTRAVENOUS at 00:02

## 2024-06-10 NOTE — ASSESSMENT & PLAN NOTE
Continue inhalers.  Stable  I have personally counseled the patient on medication indication, compliance, utilization and side effects. Patient may be discharged home with inhaler

## 2024-06-10 NOTE — PROGRESS NOTES
[facility]  Progress Note  Name: Finn Oleary I  MRN: [id number]  Unit/Bed#: MS 221Any I Date of Admission: 6/5/2024   Date of Service: 6/10/2024 I Hospital Day: 4    Assessment & Plan   * Osteomyelitis of great toe of right foot (HCC)  Assessment & Plan  Endorses right great toe pain and swelling for at least the last few days, however she is unclear if true time of onset  Deep ulcer noted to right great toe, concern for osteomyelitis-some questionable cortical disruption on x-ray  CRP 4.7, ESR 2  MRI right foot consistent with osteomyelitis of the tuft of first distal phalanx  S/P right great toe amputation by podiatry. Dressing C/D/I.   Infection disease was consulted  No intraoperative cultures obtained  Can discharge on 5 days ceftin and doxy  Continue ceftriaxone and vancomycin while admitted  Patient will need PT/OT - recommending rehab    COPD (chronic obstructive pulmonary disease) (Former[facility])  Assessment & Plan  Continue inhalers.  Stable  I have personally counseled the patient on medication indication, compliance, utilization and side effects. Patient may be discharged home with inhaler      Positive blood culture  Assessment & Plan  1 out of 2 blood culture results 6/7 revealing Staphylococcus  Repeat cultures no growth at 72 hours  Most likely contaminant    Elevated TSH  Assessment & Plan  TSH slightly elevated at 5.72  Free T4: 0.96- normal  Recommend follow up blood work with PCP in 6 weeks after discharge for ongoing monitoring    Thrombocytopenia (HCC)  Assessment & Plan  Recent Labs     06/08/24  0225 06/09/24  0418 06/10/24  0458   PLT 93* 80* 84*        Platelets 119 K on admit  Secondary to cirrhosis of the liver  DVT prophylaxis with heparin   Would not dc unless Pl are < 50.000    Metabolic encephalopathy  Assessment & Plan  Slow to respond and some word finding difficulty, though alert and oriented x 4  CTh without acute abnormality  Ammonia level 80, and patient  admits to noncompliance with lactulose  Suspect metabolic encephalopathy multifactorial in setting of acute alcohol intoxication, hepatic encephalopathy, as well as acute infection  Was started on lactulose with goal of 3-4 BM daily  Answering questions appropriately today. Continue to monitor closely    Alcohol abuse  Assessment & Plan  Reports that she only drinks 1 day a week, with last drink just prior to arrival to the ED   on CIWA protocol    Generalized anxiety disorder  Assessment & Plan  Maintained on Xanax 0.5 Mg twice daily with additional as needed dose daily  PDMP reviewed, continue home Xanax dosing    Tobacco use disorder  Assessment & Plan  Admits to smoking half a pack per day  NRT while inpatient    Alcoholic cirrhosis (HCC)  Assessment & Plan  Longstanding history of alcoholic cirrhosis complicated by ascites, esophageal varices, and hepatic encephalopathy in the past  Decompensated in the setting of hepatic encephalopathy currently, but no evidence of ascites and labs are not overtly abnormal with mildly elevated T. bili at 1.65, no ascites, and only slight elevation of coags with INR at 1.21 and PT at 15.8  GI consulted  Continue lactulose  Continue Lasix and spironolactone  Continue rifaximin            VTE Pharmacologic Prophylaxis: VTE Score: 3 Moderate Risk (Score 3-4) - Pharmacological DVT Prophylaxis Ordered: heparin.    Mobility:   Basic Mobility Inpatient Raw Score: 17  JH-HLM Goal: 5: Stand one or more mins  JH-HLM Achieved: 5: Stand (1 or more minutes)  JH-HLM Goal achieved. Continue to encourage appropriate mobility.    Patient Centered Rounds: I performed bedside rounds with nursing staff today.   Discussions with Specialists or Other Care Team Provider: Discussed at CM rounds, await placement    Education and Discussions with Family / Patient: Patient declined call to .     Total Time Spent on Date of Encounter in care of patient: 35 mins. This time was spent on one or  more of the following: performing physical exam; counseling and coordination of care; obtaining or reviewing history; documenting in the medical record; reviewing/ordering tests, medications or procedures; communicating with other healthcare professionals and discussing with patient's family/caregivers.    Current Length of Stay: 4 day(s)  Current Patient Status: Inpatient   Certification Statement: The patient will continue to require additional inpatient hospital stay due to discharge planning  Discharge Plan: Anticipate discharge in 24-48 hrs to rehab facility.    Code Status: Level 1 - Full Code    Subjective:   Patient reporting some complaints of her chronic neuropathic pain.  Otherwise, appears to be at baseline and is tolerating diet.  Offers no acute complaints today.    Objective:     Vitals:   Temp (24hrs), Av.8 °F (36 °C), Min:96.6 °F (35.9 °C), Max:97 °F (36.1 °C)    Temp:  [96.6 °F (35.9 °C)-97 °F (36.1 °C)] 97 °F (36.1 °C)  HR:  [56-63] 63  Resp:  [14-16] 14  BP: (100-126)/(53-62) 100/55  SpO2:  [90 %-92 %] 91 %  Body mass index is 21.72 kg/m².     Input and Output Summary (last 24 hours):     Intake/Output Summary (Last 24 hours) at 6/10/2024 1223  Last data filed at 2024 2300  Gross per 24 hour   Intake 190 ml   Output 200 ml   Net -10 ml       Physical Exam:   Physical Exam  Vitals and nursing note reviewed.   Constitutional:       General: She is not in acute distress.     Appearance: Normal appearance. She is well-developed.   HENT:      Head: Normocephalic and atraumatic.   Eyes:      General: No scleral icterus.     Conjunctiva/sclera: Conjunctivae normal.   Cardiovascular:      Rate and Rhythm: Normal rate and regular rhythm.      Heart sounds: No murmur heard.  Pulmonary:      Effort: Pulmonary effort is normal.      Breath sounds: No wheezing, rhonchi or rales.   Abdominal:      General: There is no distension.      Palpations: Abdomen is soft.   Skin:     General: Skin is warm and  dry.   Neurological:      Mental Status: She is alert. Mental status is at baseline.   Psychiatric:         Mood and Affect: Mood normal.         Behavior: Behavior is slowed.        Additional Data:     Labs:  Results from last 7 days   Lab Units 06/10/24  0458   WBC Thousand/uL 3.78*   HEMOGLOBIN g/dL 12.6   HEMATOCRIT % 37.5   PLATELETS Thousands/uL 84*   SEGS PCT % 41*   LYMPHO PCT % 42   MONO PCT % 11   EOS PCT % 4     Results from last 7 days   Lab Units 06/10/24  0458   SODIUM mmol/L 134*   POTASSIUM mmol/L 3.5   CHLORIDE mmol/L 100   CO2 mmol/L 31   BUN mg/dL 12   CREATININE mg/dL 0.78   ANION GAP mmol/L 3*   CALCIUM mg/dL 8.7   ALBUMIN g/dL 2.6*   TOTAL BILIRUBIN mg/dL 0.96   ALK PHOS U/L 114*   ALT U/L 10   AST U/L 28   GLUCOSE RANDOM mg/dL 86     Results from last 7 days   Lab Units 06/06/24  0517   INR  1.36*             Results from last 7 days   Lab Units 06/05/24  2229   LACTIC ACID mmol/L 1.7   PROCALCITONIN ng/ml <0.05       Lines/Drains:  Invasive Devices       Peripheral Intravenous Line  Duration             Peripheral IV 06/09/24 Right;Ventral (anterior) Forearm <1 day                          Imaging: Reviewed radiology reports from this admission including: xray(s)    Recent Cultures (last 7 days):   Results from last 7 days   Lab Units 06/07/24  0540 06/05/24  2229   BLOOD CULTURE  No Growth at 72 hrs.  No Growth at 72 hrs. No Growth After 4 Days.  Staphylococcus coagulase negative*   GRAM STAIN RESULT   --  Gram positive cocci in clusters*       Last 24 Hours Medication List:   Current Facility-Administered Medications   Medication Dose Route Frequency Provider Last Rate    acetaminophen  975 mg Oral Q8H [facility] Lilian Leyva PA-C      albuterol  2 puff Inhalation Q4H PRN Edna Hau PA-C      ALPRAZolam  0.5 mg Oral BID Niru Nicolasa, DPM      ALPRAZolam  0.5 mg Oral Daily PRN Niru Nicolasa, DPM      calcium carbonate  500 mg Oral Daily PRN Danielle Zelaya MD      carbamide peroxide   5 drop Both Ears BID Lilian Leyva PA-C      cefTRIAXone  1,000 mg Intravenous Q24H Niru Nicolasa, DPM 1,000 mg (06/09/24 2300)    folic acid  1 mg Oral Daily Niru Nicolasa, DPM      furosemide  20 mg Oral Daily Niru Nicolasa, DPM      gabapentin  300 mg Oral TID Lilian Leyva PA-C      heparin (porcine)  5,000 Units Subcutaneous Q8H [facility] Danielle Zelaya MD      ipratropium  2 puff Inhalation 4x Daily Edna Hua PA-C      lactulose  20 g Oral TID Niru Nicolasa, DPM      meclizine  12.5 mg Oral Q8H PRN Lilian Leyva PA-C      metoprolol succinate  12.5 mg Oral Daily Niru Nicolasa, DPM      nicotine  1 patch Transdermal Daily Niru Nicolasa, DPM      ondansetron  4 mg Intravenous Q6H PRN Niru Nicolasa, DPM      oxyCODONE  10 mg Oral Q4H PRN Lilian Leyva PA-C      oxyCODONE  5 mg Oral Q4H PRN Lilian Leyva PA-C      pantoprazole  40 mg Intravenous Q12H [facility] Niru Nicolasa, DPM      rifaximin  550 mg Oral Q12H [facility] Niru Nicolasa, DPM      spironolactone  50 mg Oral Daily Niru Nicolasa, DPM      thiamine  100 mg Oral Daily Niru Nicolasa, DPM      vancomycin  1,250 mg Intravenous Q24H Cassandra Parmar PA-C          Today, Patient Was Seen By: Lilian Leyva PA-C    **Please Note: This note may have been constructed using a voice recognition system.**

## 2024-06-10 NOTE — ASSESSMENT & PLAN NOTE
Endorses right great toe pain and swelling for at least the last few days, however she is unclear if true time of onset  Deep ulcer noted to right great toe, concern for osteomyelitis-some questionable cortical disruption on x-ray  CRP 4.7, ESR 2  MRI right foot consistent with osteomyelitis of the tuft of first distal phalanx  S/P right great toe amputation by podiatry. Dressing C/D/I.   Infection disease was consulted  No intraoperative cultures obtained  Can discharge on 5 days ceftin and doxy  Continue ceftriaxone and vancomycin while admitted  Patient will need PT/OT - recommending rehab

## 2024-06-10 NOTE — PLAN OF CARE
Problem: OCCUPATIONAL THERAPY ADULT  Goal: Performs self-care activities at highest level of function for planned discharge setting.  See evaluation for individualized goals.  Description: Treatment Interventions: ADL retraining, Functional transfer training, Equipment evaluation/education, Patient/family training, Compensatory technique education, Continued evaluation, Energy conservation, Activityengagement, UE strengthening/ROM, Cognitive reorientation          See flowsheet documentation for full assessment, interventions and recommendations.   Outcome: Progressing  Note: Limitation: Decreased ADL status, Decreased Safe judgement during ADL, Decreased endurance, Decreased self-care trans, Decreased high-level ADLs  Prognosis: Good  Assessment: Pt is a 68 y.o. female seen for OT evaluation at [facility], admitted 6/5/2024 w/ Osteomyelitis of great toe of right foot (HCC).  OT completed extensive review of pt's medical and social history. Comorbidities affecting pt's functional performance at time of assessment include: celluitis, hepatic encephalopathy, h/o alcohol substance use, AMS, and amputation of R greater toe . Personal factors affecting pt at time of IE include:steps to enter environment, limited home support, difficulty performing ADLS, and difficulty performing IADLS . Prior to admission, pt was living alone at home and was independent with ADL's at baseline, but required assist with transportation and grocery shopping. Upon evaluation, pt presents to OT below baseline due to the following performance deficits: weakness, decreased strength, decreased balance, decreased tolerance, impaired sensation, impaired problem solving, impulsivity, decreased safety awareness, and increased pain, and weight bearing restrictions. Pt to benefit from continued skilled OT tx while in the hospital to address deficits as defined above and maximize level of functional independence w ADL's and functional mobility.  Occupational Performance areas to address include: grooming, bathing/shower, toilet hygiene, socialization, functional mobility, community mobility, clothing management, cleaning, meal prep, and social participation. The patient's raw score on the -PAC Daily Activity inpatient short form is 16, standardized score is 35.96, less than 39.4. Patients at this level are likely to benefit from DC to post-acute rehabilitation services. Based on findings, pt is of high complexity, due to patient's co-morbidities, clinically unstable presentation, and present impairments which are a regression from the patient's baseline. At this time, OT recommendations at time of discharge are level II.     Rehab Resource Intensity Level, OT: II (Moderate Resource Intensity)

## 2024-06-10 NOTE — OCCUPATIONAL THERAPY NOTE
Occupational Therapy Evaluation & Treatment     Patient Name: Finn Oleary  Today's Date: 6/10/2024  Problem List  Principal Problem:    Osteomyelitis of great toe of right foot (HCC)  Active Problems:    Alcoholic cirrhosis (HCC)    Tobacco use disorder    Generalized anxiety disorder    Alcohol abuse    Metabolic encephalopathy    Thrombocytopenia (HCC)    Elevated TSH    Positive blood culture    COPD (chronic obstructive pulmonary disease) (HCC)    Past Medical History  Past Medical History:   Diagnosis Date    Anemia     Anxiety     Ascites     Cervical cancer (HCC)     Chronic pain     Cirrhosis (HCC)     CVA (cerebral vascular accident) (HCC)     Depression     Fibromyalgia     Hepatic encephalopathy (HCC)     Opioid dependence in remission (HCC) 11/7/2022     Past Surgical History  Past Surgical History:   Procedure Laterality Date    COLONOSCOPY  07/14/2015    COLONOSCOPY  03/15/2013    Hemorrhoids    EGD  12/23/2014    HYSTERECTOMY      NV AMPUTATION TOE INTERPHALANGEAL JOINT Right 6/7/2024    Procedure: AMPUTATION RIGHT GREAT TOE;  Surgeon: Niru Baldwin DPM;  Location:  MAIN OR;  Service: Podiatry        06/10/24 0906   OT Last Visit   OT Visit Date 06/10/24   Note Type   Note type Evaluation   Pain Assessment   Pain Assessment Tool 0-10   Pain Score 9   Pain Location/Orientation Orientation: Right;Location: Foot   Pain Onset/Description Frequency: Constant/Continuous   Patient's Stated Pain Goal No pain   Hospital Pain Intervention(s) Medication (See MAR);Repositioned   Restrictions/Precautions   Weight Bearing Precautions Per Order Yes   RLE Weight Bearing Per Order Partial weight bearing - toe touch   Braces or Orthoses Other (Comment)  (Darco wedge shoe)   Other Precautions Fall Risk;Pain;Bed Alarm;Chair Alarm;Cognitive;Hard of hearing   Home Living   Type of Home House   Home Layout Two level;Laundry in basement;Stairs to enter without rails;Other (Comment), weight bearing status  (partial  "weight bearing Toe touch)  (1 Large step to enter)   Bathroom Shower/Tub Tub/shower unit   Bathroom Toilet Standard   Bathroom Accessibility Accessible via walker   Home Equipment Walker;Cane   Prior Function   Level of Miner Independent with ADLs;Needs assistance with IADLS   Lives With Alone   Receives Help From Family;Other (Comment)  (son assists with grocery shopping)   IADLs Family/Friend/Other provides transportation;Family/Friend/Other provides meals   Falls in the last 6 months 0  (Pt repots she lists to Left, difficult to determine fall history)   Vocational Retired   General   Additional Pertinent History Pt reporting h/o CVA >10 years prior and intensive rehab following CVA recovery. History of toe amputation RLE   Family/Caregiver Present No   Subjective   Subjective \"I'm dizzy off and on. If I look up or in a certain direction, I feel more dizzy\"   ADL   Where Assessed Other (Comment)  (Edge of bed and BSC next to bed)    ADL levels of assist are based off of clinical judgment and current performance skills.    Eating Assistance 7  Independent   Eating Deficit None   Grooming Assistance 6  Modified Independent   Grooming Deficit None   UB Bathing Assistance 4  Minimal Assistance   LB Bathing Assistance 2  Maximal Assistance   UB Dressing Assistance 4  Minimal Assistance   LB Dressing Assistance 2  Maximal Assistance   Toileting Assistance  2  Maximal Assistance   Toileting Deficit Steadying;Increased time to complete;Bedside commode;Perineal hygiene   Bed Mobility   Rolling R 4  Minimal assistance   Rolling L 4  Minimal assistance   Supine to Sit 4  Minimal assistance   Additional items Assist x 1;HOB elevated   Sit to Supine 3  Moderate assistance   Additional items Assist x 1;HOB elevated   Transfers   Sit to Stand 3  Moderate assistance   Additional items Assist x 1;Increased time required;Verbal cues;Impulsive  (Rolling walker)   Stand to Sit 4  Minimal assistance   Additional items " Increased time required   Stand pivot 4  Minimal assistance   Additional items Assist x 1  (Rolling walker)   Balance   Static Sitting Fair +   Dynamic Sitting Poor   Activity Tolerance   Activity Tolerance Patient limited by pain   RUE Assessment   RUE Assessment WFL   Edema   RUE Edema None   LUE Assessment   LUE Assessment WFL   Hand Function   Gross Motor Coordination Functional   Fine Motor Coordination Functional   Vision-Basic Assessment   Current Vision Other (Comment)   Visual History Other (Comment)  (Reporting some blurriness, may be due to needing reading glasses)   Psychosocial   Psychosocial (WDL) X   Patient Behaviors/Mood Anxious;Labile;Other (Comment)  (Oriented x4 but tangential in conversation, poor historian)   Perception   Inattention/Neglect Appears intact   Cognition   Overall Cognitive Status WFL   Arousal/Participation Alert;Cooperative   Attention Within functional limits   Orientation Level Oriented X4;Oriented to person;Oriented to place;Oriented to time;Oriented to situation   Memory Within functional limits   Following Commands Follows one step commands with increased time or repetition   Assessment   Limitation Decreased ADL status;Decreased Safe judgement during ADL;Decreased endurance;Decreased self-care trans;Decreased high-level ADLs   Prognosis Good   Assessment Pt is a 68 y.o. female seen for OT evaluation at [facility], admitted 6/5/2024 w/ Osteomyelitis of great toe of right foot (HCC).  OT completed extensive review of pt's medical and social history. Comorbidities affecting pt's functional performance at time of assessment include: celluitis, hepatic encephalopathy, h/o alcohol substance use, AMS, and amputation of R greater toe . Personal factors affecting pt at time of IE include:steps to enter environment, limited home support, difficulty performing ADLS, and difficulty performing IADLS . Prior to admission, pt was living alone at home and was independent with ADL's  at baseline, but required assist with transportation and grocery shopping. Upon evaluation, pt presents to OT below baseline due to the following performance deficits: weakness, decreased strength, decreased balance, decreased tolerance, impaired sensation, impaired problem solving, impulsivity, decreased safety awareness, and increased pain, and weight bearing restrictions. Pt to benefit from continued skilled OT tx while in the hospital to address deficits as defined above and maximize level of functional independence w ADL's and functional mobility. Occupational Performance areas to address include: grooming, bathing/shower, toilet hygiene, socialization, functional mobility, community mobility, clothing management, cleaning, meal prep, and social participation. The patient's raw score on the AM-PAC Daily Activity inpatient short form is 16, standardized score is 35.96, less than 39.4. Patients at this level are likely to benefit from DC to post-acute rehabilitation services. Based on findings, pt is of high complexity, due to patient's co-morbidities, clinically unstable presentation, and present impairments which are a regression from the patient's baseline. At this time, OT recommendations at time of discharge are level II.   Goals   Patient Goals To be able to walk   Plan   Treatment Interventions ADL retraining;Functional transfer training;Equipment evaluation/education;Patient/family training;Compensatory technique education;Continued evaluation;Energy conservation;Activityengagement;UE strengthening/ROM;Cognitive reorientation   Goal Expiration Date 06/20/24   OT Treatment Day 0   OT Frequency 2-3x/wk   Discharge Recommendation   Rehab Resource Intensity Level, OT II (Moderate Resource Intensity)   AM-PAC Daily Activity Inpatient   Lower Body Dressing 2   Bathing 2   Toileting 2   Upper Body Dressing 3   Grooming 3   Eating 4   Daily Activity Raw Score 16   Daily Activity Standardized Score (Calc for Raw  Score >=11) 35.96   AM-PAC Applied Cognition Inpatient   Following a Speech/Presentation 3   Understanding Ordinary Conversation 3   Taking Medications 3   Remembering Where Things Are Placed or Put Away 3   Remembering List of 4-5 Errands 2   Taking Care of Complicated Tasks 2   Applied Cognition Raw Score 16   Applied Cognition Standardized Score 35.03   Additional Treatment Session   Start Time 0930   End Time 0952   Treatment Assessment Pt rec'ed supine in bed and agreeable to transfer training to [facility]. Pt transfered to [facility] with min A x 1 with rolling walker and darco shoe donned on R LE. Pt required dependent assist of donning darco shoe seated EOB. Pt required max A x 1 with toileting for periarea hygiene. Pt left supine in bed with HOB elevated with RN present for medication needs.   End of Consult   Education Provided Yes   Patient Position at End of Consult Supine;Bed/Chair alarm activated;All needs within reach   Nurse Communication Nurse aware of consult;Other (comment)  (Yashira ESPINOSA)       Pt will achieve the following goals within 10 days.    *Pt will complete grooming with in sitting with SBA.    *Pt will complete UB dressing with Supervision.    *Pt will complete LB dressing with SBA .    *Pt will complete toileting (hygiene and clothing management) with CGA.    *Pt will perform functional transfers with CBA in order to complete ADL routine.    *Pt will complete item retrieval and light home management with CGA while demonstrating good safety.    *Pt will demonstrate increased activity tolerance in order to complete ADL routine.    *Pt will participate in cognitive assessment to determine level of safety for returning home    *Pt will participate in UE therapeutic exercise in order to maximize strength for ADL transfers.    *Pt will sit on EOB for 10 minutes for increased safety with seated activity tolerance during ADL tasks.    *Pt will identify weight bearing precautions to ensure safety upon  discharge.      Elmira Nam, OTR/L

## 2024-06-10 NOTE — PROGRESS NOTES
Finn Oleary is a 68 y.o. female who is currently ordered Vancomycin IV with management by the Pharmacy Consult service.  Relevant clinical data and objective / subjective history reviewed.  Vancomycin Assessment:  Indication and Goal AUC/Trough: Bone/joint infection (goal -600, trough >10), -600, trough >10  Clinical Status: stable  Micro:     Renal Function:  SCr: 0.78 mg/dL  CrCl: 56 mL/min  Renal replacement: Not on dialysis  Days of Therapy: 5  Current Dose: 1000mg Q24H  Vancomycin Plan:  New Dosing: Change to 1250mg Q24H  Estimated AUC: 526 mcg*hr/mL  Estimated Trough: 12.2 mcg/mL  Next Level: Random Level 0600  6/15/24  Renal Function Monitoring: Daily BMP and UOP  Pharmacy will continue to follow closely for s/sx of nephrotoxicity, infusion reactions and appropriateness of therapy.  BMP and CBC will be ordered per protocol. We will continue to follow the patient’s culture results and clinical progress daily.    Sebastián Soto, Pharmacist

## 2024-06-10 NOTE — CASE MANAGEMENT
Case Management Discharge Planning Note    Patient name Finn Oleary  Location /-01 MRN [id number]  : 1955 Date 6/10/2024       Current Admission Date: 2024  Current Admission Diagnosis:Osteomyelitis of great toe of right foot (HCC)   Patient Active Problem List    Diagnosis Date Noted Date Diagnosed    COPD (chronic obstructive pulmonary disease) (HCC) 2024     Positive blood culture 2024     Alcohol abuse 2024     Osteomyelitis of great toe of right foot (HCC) 2024     Metabolic encephalopathy 2024     Thrombocytopenia (HCC) 2024     Elevated TSH 2024     Tobacco use disorder 2022     Generalized anxiety disorder 2022     Major depressive disorder, recurrent episode, moderate (HCC) 2022     Presence of surgical incision 2021     Ambulatory dysfunction 2021     Esophageal candidiasis (HCC) 2019     Alcoholic cirrhosis (HCC) 2019     Ascites 2019     Jaundice 2019     Hepatic encephalopathy (HCC) 2019     Esophageal varices (HCC) 2019       LOS (days): 4  Geometric Mean LOS (GMLOS) (days): 7.3  Days to GMLOS:2.7     OBJECTIVE:  Risk of Unplanned Readmission Score: 11.92         Current admission status: Inpatient   [facility] Pharmacy:   [facility] [place] [address]  Phone: [phone] Fax: [phone]    [id number] [address] [facility]  [id number] [facility] [address]  Phone: [phone] Fax: [phone]    Primary Care Provider: Scot Brink MD    Primary Insurance: MEDICARE  Secondary Insurance: [facility]    DISCHARGE DETAILS:       Transport at Discharge : [facility]  Dispatcher Contacted: Yes  Number/Name of Dispatcher: Shilpi Serna     ETA of Transport (Date): 06/10/24  ETA of Transport (Time): 1730      Transfer Mode: Stretcher     Additional Comments: Transport arranged through [employer] for 5:30pm. CM notified pt, pt's son Prakash, pt's bedside RN Yashira, and Jeri Woodson liasion of dc time. facility transfer form and CMN completed. CMN placed in chart and medical record bin.    Accepting Facility Name, City & State : [facility]  Receiving Facility/Agency Phone Number: [phone]  Facility/Agency Fax Number: [phone]

## 2024-06-10 NOTE — PLAN OF CARE
Problem: PHYSICAL THERAPY ADULT  Goal: Performs mobility at highest level of function for planned discharge setting.  See evaluation for individualized goals.  Description: Treatment/Interventions: Functional transfer training, LE strengthening/ROM, Elevations, Therapeutic exercise, Endurance training, Patient/family training, Bed mobility, Gait training, Compensatory technique education, Spoke to nursing          See flowsheet documentation for full assessment, interventions and recommendations.  Note: Prognosis: Good  Problem List: Decreased strength, Decreased range of motion, Decreased endurance, Impaired balance, Decreased mobility, Decreased coordination, Decreased safety awareness, Pain, Orthopedic restrictions  Assessment: Finn Oleary is a 68 y.o. Female who presents to [facility] on 6/5/24 from home w/ osteomyelitis of R great toe requiring amputation of the toe. Pt was issued a Darco wedge shoe 3 days ago and orders for PT eval and treat received for post op mobilization, w/ activity orders of up and out of bed as tolerated, fall precautions, and heel touch weight bearing using Darco wedge shoe Pt presents w/ comorbidities of celluitis, hepatic encephalopathy, h/o alcohol substance use, AMS, and amputation of R greater toe. At baseline, pt mobilizes independently w/ a walker or cane, and reports 0 falls in the last 6 months. Upon evaluation, pt presents w/ the following deficits: weakness, decreased ROM, impaired coordination, impaired skin integrity, impaired balance, decreased endurance, and pain limiting functional mobility. Pt currently demonstrates min assist for bed mobility, min assist for transfers, and min assist with RW and Darco wedge shoe for ambulation. The patient's AM-PAC Basic Mobility Inpatient Short Form Raw Score is 15. A Raw score of less than or equal to 17 suggests the patient may benefit from discharge to post-acute rehabilitation services. Please also refer to the recommendation of  the Physical Therapist for safe discharge planning. Based on current status, Level 2 rehab intensity is recommended at time of discharge. At this time, pt would benefit from continued skilled PT services, in the acute care setting, in order to address the abovementioned deficits and maximize independence and functional gains prior to discharge.  Barriers to Discharge: Inaccessible home environment, Decreased caregiver support (functional status)     Rehab Resource Intensity Level, PT: II (Moderate Resource Intensity)    See flowsheet documentation for full assessment.

## 2024-06-10 NOTE — CASE MANAGEMENT
Case Management Assessment & Discharge Planning Note    Patient name Finn Oleary  Location /-01 MRN [id number]  : 1955 Date 6/10/2024       Current Admission Date: 2024  Current Admission Diagnosis:Osteomyelitis of great toe of right foot (HCC)   Patient Active Problem List    Diagnosis Date Noted Date Diagnosed    COPD (chronic obstructive pulmonary disease) (HCC) 2024     Positive blood culture 2024     Alcohol abuse 2024     Osteomyelitis of great toe of right foot (HCC) 2024     Metabolic encephalopathy 2024     Thrombocytopenia (HCC) 2024     Elevated TSH 2024     Tobacco use disorder 2022     Generalized anxiety disorder 2022     Major depressive disorder, recurrent episode, moderate (HCC) 2022     Presence of surgical incision 2021     Ambulatory dysfunction 2021     Esophageal candidiasis (HCC) 2019     Alcoholic cirrhosis (HCC) 2019     Ascites 2019     Jaundice 2019     Hepatic encephalopathy (HCC) 2019     Esophageal varices (HCC) 2019       LOS (days): 4  Geometric Mean LOS (GMLOS) (days): 7.3  Days to GMLOS:2.7     OBJECTIVE:    Risk of Unplanned Readmission Score: 11.92         Current admission status: Inpatient       [facility]:   [facility] [address]  Phone: [phone] Fax: [phone]    [id number] - [place], [place] - [address] [facility]  [address] [facility] [address]  Phone: [phone] Fax: [phone]    Primary Care Provider: Scot Brink MD    Primary Insurance: MEDICARE  Secondary Insurance: [facility]    ASSESSMENT:  Active Health Care Proxies       Shagufta Prakash Health Care Representative - Child   Primary Phone: [phone] (Mobile)  Home Phone: [phone]                 Advance  Directives  Does patient have a Health Care POA?: No  Was patient offered paperwork?: Yes (declined)  Does patient currently have a Health Care decision maker?: Yes, please see Health Care Proxy section  Does patient have Advance Directives?: No  Was patient offered paperwork?: Yes (declined)      Readmission Root Cause  30 Day Readmission: No    Patient Information  Admitted from:: Home  Mental Status: Alert  During Assessment patient was accompanied by: Not accompanied during assessment  Assessment information provided by:: Patient  Primary Caregiver: Self  Support Systems: Self, Spouse/significant other, Son, Daughter  Home entry access options. Select all that apply.: Stairs  Number of steps to enter home.: 5  Type of Current Residence: Apartment  Floor Level: 1  Upon entering residence, is there a bedroom on the main floor (no further steps)?: Yes  Upon entering residence, is there a bathroom on the main floor (no further steps)?: Yes  Living Arrangements: Lives Alone    Activities of Daily Living Prior to Admission  Functional Status: Independent  Completes ADLs independently?: Yes  Ambulates independently?: Yes  Does patient use assisted devices?: No  Does patient currently own DME?: No  Does patient have a history of Outpatient Therapy (PT/OT)?: No  Does the patient have a history of Short-Term Rehab?: No  Does patient have a history of HHC?: No  Does patient currently have HHC?: No      Patient Information Continued  Does patient have prescription coverage?: Yes  Does patient receive dialysis treatments?: No  Does patient have a history of substance abuse?: No  Does patient have a history of Mental Health Diagnosis?: No      Social Determinants of Health (SDOH)      Flowsheet Row Most Recent Value   Housing Stability    In the last 12 months, was there a time when you were not able to pay the mortgage or rent on time? N   In the past 12 months, how many times have you moved where you were living? 1   At any  time in the past 12 months, were you homeless or living in a shelter (including now)? N   Transportation Needs    In the past 12 months, has lack of transportation kept you from medical appointments or from getting medications? no   In the past 12 months, has lack of transportation kept you from meetings, work, or from getting things needed for daily living? No   Food Insecurity    Within the past 12 months, you worried that your food would run out before you got the money to buy more. Never true   Within the past 12 months, the food you bought just didn't last and you didn't have money to get more. Never true   Utilities    In the past 12 months has the electric, gas, oil, or water company threatened to shut off services in your home? No            DISCHARGE DETAILS:    Discharge planning discussed with:: patient and son Prakash  Freedom of Choice: Yes  Comments - Freedom of Choice: discussed therapies recommendation for rehab; agreeable.  CM contacted family/caregiver?: Yes  Were Treatment Team discharge recommendations reviewed with patient/caregiver?: Yes  Did patient/caregiver verbalize understanding of patient care needs?: Yes  Were patient/caregiver advised of the risks associated with not following Treatment Team discharge recommendations?: Yes    Contacts  Patient Contacts: Prakash (son)  Relationship to Patient:: Family  Contact Method: Phone  Phone Number: [phone]  Reason/Outcome: Discharge Planning, Referral    Requested Home Health Care         Is the patient interested in HHC at discharge?: No    DME Referral Provided  Referral made for DME?: No    Other Referral/Resources/Interventions Provided:  Interventions: Short Term Rehab  Referral Comments: referrals to SNF's within 10 miles    Treatment Team Recommendation: Short Term Rehab  Discharge Destination Plan:: Short Term Rehab     IMM Given (Date):: 06/10/24  IMM Given to:: Patient     Additional Comments: Met with pt to discuss the role of CM and  to discuss any help patient may need prior to dc. pt lives alone in a 1st floor apartment with 5 MOOSE. Pt performed ADL's indptly pta, pt has a RW, cane and shower chair. No hx of mental health or D&A treatment. No hx of HHC. Hx of rehab but unsure of agency. CM discussed therapies recommendation for rehab; pt is agreeable. [facility] referrals sent. Met with pt to discuss accepting facility; [facility]. Pt is agreeable but requesting CM speak with her son Prakash to discuss same. CM called and spoke to Prakash. He is agreeable. Prakash states he has been in contact with his sister Cheyenne and will update her.

## 2024-06-10 NOTE — ASSESSMENT & PLAN NOTE
Reports that she only drinks 1 day a week, with last drink just prior to arrival to the ED   on [facility] protocol

## 2024-06-10 NOTE — DISCHARGE SUMMARY
[facility]  Discharge- Finn Oleary 1955, 68 y.o. female MRN: [id number]  Unit/Bed#: [facility] [place] Encounter: [id number]  Primary Care Provider: Scot Brink MD   Date and time admitted to hospital: 6/5/2024  9:50 PM    * Osteomyelitis of great toe of right foot (HCC)  Assessment & Plan  Endorses right great toe pain and swelling for at least the last few days, however she is unclear if true time of onset  Deep ulcer noted to right great toe, concern for osteomyelitis-some questionable cortical disruption on x-ray  CRP 4.7, ESR 2  MRI right foot consistent with osteomyelitis of the tuft of first distal phalanx  S/P right great toe amputation by podiatry. Dressing C/D/I.   Infection disease was consulted  No intraoperative cultures obtained  Can discharge on 5 days ceftin and doxy  Continue ceftriaxone and vancomycin while admitted  Patient will need PT/OT - recommending rehab    COPD (chronic obstructive pulmonary disease) ([facility])  Assessment & Plan  Continue inhalers.  Stable  I have personally counseled the patient on medication indication, compliance, utilization and side effects. Patient may be discharged home with inhaler      Positive blood culture  Assessment & Plan  1 out of 2 blood culture results 6/7 revealing Staphylococcus  Repeat cultures no growth at 72 hours  Most likely contaminant    Elevated TSH  Assessment & Plan  TSH slightly elevated at 5.72  Free T4: 0.96- normal  Recommend follow up blood work with PCP in 6 weeks after discharge for ongoing monitoring    Thrombocytopenia (HCC)  Assessment & Plan  Recent Labs     06/08/24  0225 06/09/24  0418 06/10/24  0458   PLT 93* 80* 84*        Platelets 119 K on admit  Secondary to cirrhosis of the liver  DVT prophylaxis with heparin   Would not dc unless Pl are < 50.000    Metabolic encephalopathy  Assessment & Plan  Slow to respond and some word finding difficulty, though alert and oriented x 4  CTh without acute  abnormality  Ammonia level 80, and patient admits to noncompliance with lactulose  Suspect metabolic encephalopathy multifactorial in setting of acute alcohol intoxication, hepatic encephalopathy, as well as acute infection  Was started on lactulose with goal of 3-4 BM daily  Answering questions appropriately today. Continue to monitor closely    Alcohol abuse  Assessment & Plan  Reports that she only drinks 1 day a week, with last drink just prior to arrival to the ED   on CIWA protocol    Generalized anxiety disorder  Assessment & Plan  Maintained on Xanax 0.5 Mg twice daily with additional as needed dose daily  PDMP reviewed, continue home Xanax dosing    Tobacco use disorder  Assessment & Plan  Admits to smoking half a pack per day  NRT while inpatient    Alcoholic cirrhosis (HCC)  Assessment & Plan  Longstanding history of alcoholic cirrhosis complicated by ascites, esophageal varices, and hepatic encephalopathy in the past  Decompensated in the setting of hepatic encephalopathy currently, but no evidence of ascites and labs are not overtly abnormal with mildly elevated T. bili at 1.65, no ascites, and only slight elevation of coags with INR at 1.21 and PT at 15.8  GI consulted  Continue lactulose  Continue Lasix and spironolactone  Continue rifaximin       Medical Problems       Resolved Problems  Date Reviewed: 6/10/2024   None       Discharging Physician / Practitioner: Lilian Leyva PA-C  PCP: Scot Brink MD  Admission Date:   Admission Orders (From admission, onward)       Ordered        06/06/24 0019  INPATIENT ADMISSION  Once                          Discharge Date: 06/10/24    Consultations During Hospital Stay:  Podiatry  Gastroenterology  Infectious Disease     Procedures Performed:   Amputation right great toe 6/7    Significant Findings / Test Results:   CXR 6/5: No acute cardiopulmonary disease  Right foot x-ray 6/5: Diminished cortical conspicuity distal tuft first phalanx with overlying soft  tissue ulceration, findings suspicious for osteomyelitis.   CT head 6/5: No acute intracranial abnormality.  Chronic microangiopathic changes.Old right frontoparietal infarct.  MRI R foot: Findings consistent with osteomyelitis of the tuft of the first distal phalanx.   US RUQ 6/7: Nodular hepatic contours in keeping with cirrhosis. Cholelithiasis with negative sonographic Sanchez's sign. Cholecystitis is unlikely.  Xray R foot 6/7: No acute osseous abnormality. The distal phalanx of the great toe has been amputated.   Blood cultures sick/5, 1 out of 2 positive for staph coag negative  Repeat blood culture 6/7: Negative x72 hours    Incidental Findings:   None     Test Results Pending at Discharge (will require follow up):   None     Outpatient Tests Requested:  EGD to assess for varices    Complications:  None    Reason for Admission: Osteomyelitis of right great toe    Hospital Course:   Finn Oleary is a 68 y.o. female patient with past medical history of alcoholic cirrhosis of the liver with decompensated, history of ascites, esophageal varices, hepatic encephalopathy, anxiety, tobacco use who originally presented to the hospital on 6/5/2024 due to swelling and redness of right great toe associated with pain.  Patient reports that this had been painful intermittently over the last 2 weeks prior to presenting to the hospital.  He also endorsed noncompliance with her lactulose.  She did appear to be encephalopathic on admission likely secondary to underlying infection as well as elevated ammonia level. Appreciate GI involvement and establishing appropriate lactulose regimen for patient.  Ammonia was restarted with improvement cognitive function.    MRI ultimately revealed osteomyelitis of the right great toe which was surgically removed with the assistance of Podiatry.  D consulted for antibiotic recommendations on discharge.  Patient is to complete 5 additional days of doxycycline and cefdinir to complete  antibiotic course.  PT/OT recommending rehab for patient.  Hemodynamically stable at time of discharge and appropriate for transfer to rehab facility.    Please see above list of diagnoses and related plan for additional information.     Condition at Discharge: stable    Discharge Day Visit / Exam:   * Please refer to separate progress note for these details *    Discussion with Family: Patient declined call to .     Discharge instructions/Information to patient and family:   See after visit summary for information provided to patient and family.      Provisions for Follow-Up Care:  See after visit summary for information related to follow-up care and any pertinent home health orders.      Mobility at time of Discharge:   Basic Mobility Inpatient Raw Score: 15  -HLM Goal: 4: Move to chair/commode  JH-HLM Achieved: 6: Walk 10 steps or more  HLM Goal achieved. Continue to encourage appropriate mobility.     Disposition:   Other Skilled Nursing Facility at [facility]    Planned Readmission: None     Discharge Statement:  I spent 65 minutes discharging the patient. This time was spent on the day of discharge. I had direct contact with the patient on the day of discharge. Greater than 50% of the total time was spent examining patient, answering all patient questions, arranging and discussing plan of care with patient as well as directly providing post-discharge instructions.  Additional time then spent on discharge activities.    Discharge Medications:  See after visit summary for reconciled discharge medications provided to patient and/or family.      **Please Note: This note may have been constructed using a voice recognition system**

## 2024-06-10 NOTE — CONSULTS
Consultation - Infectious Disease   Finn Oleary 68 y.o. female MRN: [id number]  Unit/Bed#: -01 Encounter: [id number]      Assessment & Plan     Assessment:  68-year-old woman with right great toe osteomyelitis status post disarticulation resection, cirrhosis    Plan:  1) OM / SSTI -patient likely with curative resection, though he may still have residual skin and soft tissue infection so we will continue antibiotics to complete about 10 days total of antibiotics.  Can transition to an oral regimen with similar spectrum to patient's empirical regimen given lack of culture data.    ===> While patient elmer in house, please continue vancomycin, ceftriaxone and follow for toxicity while on these agents.  ===> On discharge, please prescribe doxycycline 100 mg p.o. twice daily, cefdinir 300 mg p.o. twice daily to complete about 10 days of total therapy (i.e. until 15 Priscilla 2024).    2) Cirrhosis - Pt. remains at risk for infectious compilations, continue mgmt. per GI.     ===> Discussed w/ 1' team   ===> Will sign off, but please do not hesitate to contact us with further questions, or if a change in the patient's clinical status warrants.    ===> Patient does NOT need to schedule f/u with ID on d/c UNLESS a change in clinical status or a recrudescence of symptoms warrants, but was given our contact information should any issues with prescribed post-d/c treatment arise.        History of Present Illness   Physician Requesting Consult: Cristina Ford MD    HPI:   Briefly, this 68-year-old woman with depression, alcoholic cirrhosis, and fibromyalgia presented to the [facility] emergency department on 6/5 complaining of the subacute onset of right great toe swelling.  Workup at the time of admission was notable for normal creatinine (0.67, as well as normal white blood cell count (4740).  Blood cultures drawn at the time of admission were notable for coagulase-negative Staphylococcus species in 1 of 2 sets,  however follow-up cultures have been without growth.  The patient was taken to the operating room on 6/7 (the day of admission) for disarticulation amputation of the distal aspect of the right great toe (operative note reviewed).  Unfortunately, no cultures were sent from this intervention.  Presently, patient denies fever, chills, nausea, and vomiting, and is tolerating broad empirical antibiotic therapy (vancomycin, ceftriaxone) well.  She remains afebrile.  Review of available records within the [facility]'s EMR shows no history of extensively drug-resistant organisms.        Inpatient consult to Infectious Diseases  Consult performed by: Rizwan Gonzalez MD  Consult ordered by: Danielle Zelaya MD          Review of Systems  A complete review of systems was done and is negative except as per the HPI.    Historical Information   Past Medical History:   Diagnosis Date    Anemia     Anxiety     Ascites     Cervical cancer (HCC)     Chronic pain     Cirrhosis (HCC)     CVA (cerebral vascular accident) (HCC)     Depression     Fibromyalgia     Hepatic encephalopathy (HCC)     Opioid dependence in remission (HCC) 11/7/2022     Past Surgical History:   Procedure Laterality Date    COLONOSCOPY  07/14/2015    COLONOSCOPY  03/15/2013    Hemorrhoids    EGD  12/23/2014    HYSTERECTOMY      WI AMPUTATION TOE INTERPHALANGEAL JOINT Right 6/7/2024    Procedure: AMPUTATION RIGHT GREAT TOE;  Surgeon: Niru Baldwin DPM;  Location:  MAIN OR;  Service: Podiatry     Social History   Social History     Substance and Sexual Activity   Alcohol Use Not Currently    Comment: Had been drinking anywhere from 3-5 drinks of hard alcohol daily for the past 2 years, quit 8/17     Social History     Substance and Sexual Activity   Drug Use Not on file     E-Cigarette/Vaping     E-Cigarette/Vaping Substances    Nicotine No     Flavoring No      Social History     Tobacco Use   Smoking Status Every Day   Smokeless Tobacco Never     Family  History: non-contributory    Meds/Allergies   all current active meds have been reviewed    Allergies   Allergen Reactions    Gadolinium     Nsaids     Other Palpitations     All anti depressants       Objective       Intake/Output Summary (Last 24 hours) at 6/10/2024 1141  Last data filed at 6/9/2024 2300  Gross per 24 hour   Intake 190 ml   Output 500 ml   Net -310 ml       Invasive Devices:   Peripheral IV 06/09/24 Right;Ventral (anterior) Forearm (Active)   Site Assessment WDL 06/10/24 0900   Dressing Type Transparent 06/10/24 0900   Line Status Flushed;Saline locked 06/10/24 0900   Dressing Status Clean;Dry;Intact 06/10/24 0900   Dressing Change Due 06/13/24 06/10/24 0900   Reason Not Rotated Not due 06/10/24 0900       Physical Exam   Gen: AA, NAD, VS reviewed  ENT: MMM  CV: No m/r/g, S1, S2  Pulm: Lungs CTAB, no respiratory distress  ABD: S/NT/ND  Skin: No rash on exposed skin  Psych: Oriented, nl. affect    Lab Results: I have personally reviewed pertinent labs.  Imaging Studies: I have personally reviewed pertinent reports.   and I have personally reviewed pertinent films in PACS  EKG, Pathology, and Other Studies: I have personally reviewed pertinent reports.

## 2024-06-10 NOTE — ASSESSMENT & PLAN NOTE
Reports that she only drinks 1 day a week, with last drink just prior to arrival to the ED   on [facility] protocol

## 2024-06-10 NOTE — PLAN OF CARE
Problem: Potential for Falls  Goal: Patient will remain free of falls  Description: INTERVENTIONS:  - Educate patient/family on patient safety including physical limitations  - Instruct patient to call for assistance with activity   - Consult OT/PT to assist with strengthening/mobility   - Keep Call bell within reach  - Keep bed low and locked with side rails adjusted as appropriate  - Keep care items and personal belongings within reach  - Initiate and maintain comfort rounds  - Make Fall Risk Sign visible to staff  - Offer Toileting every 2 Hours, in advance of need  - Initiate/Maintain alarm  - Obtain necessary fall risk management equipment:   - Apply yellow socks and bracelet for high fall risk patients  - Consider moving patient to room near nurses station  Outcome: Progressing     Problem: PAIN - ADULT  Goal: Verbalizes/displays adequate comfort level or baseline comfort level  Description: Interventions:  - Encourage patient to monitor pain and request assistance  - Assess pain using appropriate pain scale  - Administer analgesics based on type and severity of pain and evaluate response  - Implement non-pharmacological measures as appropriate and evaluate response  - Consider cultural and social influences on pain and pain management  - Notify physician/advanced practitioner if interventions unsuccessful or patient reports new pain  Outcome: Progressing     Problem: INFECTION - ADULT  Goal: Absence or prevention of progression during hospitalization  Description: INTERVENTIONS:  - Assess and monitor for signs and symptoms of infection  - Monitor lab/diagnostic results  - Monitor all insertion sites, i.e. indwelling lines, tubes, and drains  - Monitor endotracheal if appropriate and nasal secretions for changes in amount and color  - [place] appropriate cooling/warming therapies per order  - Administer medications as ordered  - Instruct and encourage patient and family to use good hand hygiene  technique  - Identify and instruct in appropriate isolation precautions for identified infection/condition  Outcome: Progressing  Goal: Absence of fever/infection during neutropenic period  Description: INTERVENTIONS:  - Monitor WBC    Outcome: Progressing     Problem: SAFETY ADULT  Goal: Patient will remain free of falls  Description: INTERVENTIONS:  - Educate patient/family on patient safety including physical limitations  - Instruct patient to call for assistance with activity   - Consult OT/PT to assist with strengthening/mobility   - Keep Call bell within reach  - Keep bed low and locked with side rails adjusted as appropriate  - Keep care items and personal belongings within reach  - Initiate and maintain comfort rounds  - Make Fall Risk Sign visible to staff  - Offer Toileting every 2 Hours, in advance of need  - Initiate/Maintain alarm  - Obtain necessary fall risk management equipment:   - Apply yellow socks and bracelet for high fall risk patients  - Consider moving patient to room near nurses station  Outcome: Progressing  Goal: Maintain or return to baseline ADL function  Description: INTERVENTIONS:  - Educate patient/family on patient safety including physical limitations  - Instruct patient to call for assistance with activity   - Consult OT/PT to assist with strengthening/mobility   - Keep Call bell within reach  - Keep bed low and locked with side rails adjusted as appropriate  - Keep care items and personal belongings within reach  - Initiate and maintain comfort rounds  - Make Fall Risk Sign visible to staff  - Offer Toileting every 2 Hours, in advance of need  - Initiate/Maintain alarm  - Obtain necessary fall risk management equipment:   - Apply yellow socks and bracelet for high fall risk patients  - Consider moving patient to room near nurses station  Outcome: Progressing  Goal: Maintains/Returns to pre admission functional level  Description: INTERVENTIONS:  - Perform AM-PAC 6 Click Basic  Mobility/ Daily Activity assessment daily.  - Set and communicate daily mobility goal to care team and patient/family/caregiver.   - Collaborate with rehabilitation services on mobility goals if consulted  - Perform Range of Motion 3 times a day.  - Reposition patient every 2 hours.  - Dangle patient 3 times a day  - Stand patient 3 times a day  - Ambulate patient 3 times a day  - Out of bed to chair 3 times a day   - Out of bed for meals 3 times a day  - Out of bed for toileting  - Record patient progress and toleration of activity level   Outcome: Progressing     Problem: DISCHARGE PLANNING  Goal: Discharge to home or other facility with appropriate resources  Description: INTERVENTIONS:  - Identify barriers to discharge w/patient and caregiver  - Arrange for needed discharge resources and transportation as appropriate  - Identify discharge learning needs (meds, wound care, etc.)  - Arrange for interpretive services to assist at discharge as needed  - Refer to Case Management Department for coordinating discharge planning if the patient needs post-hospital services based on physician/advanced practitioner order or complex needs related to functional status, cognitive ability, or social support system  Outcome: Progressing     Problem: Knowledge Deficit  Goal: Patient/family/caregiver demonstrates understanding of disease process, treatment plan, medications, and discharge instructions  Description: Complete learning assessment and assess knowledge base.  Interventions:  - Provide teaching at level of understanding  - Provide teaching via preferred learning methods  Outcome: Progressing     Problem: Prexisting or High Potential for Compromised Skin Integrity  Goal: Skin integrity is maintained or improved  Description: INTERVENTIONS:  - Identify patients at risk for skin breakdown  - Assess and monitor skin integrity  - Assess and monitor nutrition and hydration status  - Monitor labs   - Assess for incontinence    - Turn and reposition patient  - Assist with mobility/ambulation  - Relieve pressure over bony prominences  - Avoid friction and shearing  - Provide appropriate hygiene as needed including keeping skin clean and dry  - Evaluate need for skin moisturizer/barrier cream  - Collaborate with interdisciplinary team   - Patient/family teaching  - Consider wound care consult   Outcome: Progressing

## 2024-06-10 NOTE — PROGRESS NOTES
The pantoprazole has / have been converted to Oral per [facility] IV-to-PO Auto-Conversion Protocol for Adults as approved by the Pharmacy and Therapeutics Committee. The patient met all eligible criteria:  1) Age = 18 years old   2) Received at least one dose of the IV form   3) Receiving at least one other scheduled oral/enteral medication   4) Tolerating an oral/enteral diet   and did not have any exclusions:   1) Critical care patient   2) Active GI bleed (IF assessing H2RAs or PPIs)   3) Continuous tube feeding (IF assessing cipro, doxycycline, levofloxacin, minocycline, rifampin, or voriconazole)   4) Receiving PO vancomycin (IF assessing metronidazole)   5) Persistent nausea and/or vomiting   6) Ileus or gastrointestinal obstruction   7) Chris/nasogastric tube set for continuous suction   8) Specific order not to automatically convert to PO (in the order's comments or if discussed in the most recent Infectious Disease or primary team's progress notes).

## 2024-06-10 NOTE — PHYSICAL THERAPY NOTE
"                                                                                  PHYSICAL THERAPY EVALUATION NOTE      Patient Name: Finn Oleary  Today's Date: 6/10/2024       06/10/24 1205   PT Last Visit   PT Visit Date 06/10/24   Note Type   Note type Evaluation   Pain Assessment   Pain Assessment Tool 0-10   Pain Score 8   Pain Location/Orientation   (entire spine, R foot)   Hospital Pain Intervention(s) Repositioned  (PT)   Restrictions/Precautions   Weight Bearing Precautions Per Order Yes   RLE Weight Bearing Per Order   (Partial Heel touch weight bearing in Darco wedge shoe)   Braces or Orthoses   (Darco wedge shoe)   Other Precautions Chair Alarm;Bed Alarm;Cognitive;Fall Risk;Pain;Hard of hearing;WBS   Home Living   Type of Home House   Home Layout Two level  (1 large MOOSE, stairs to 2nd floor)   Bathroom Shower/Tub Tub/shower unit   Home Equipment Walker;Cane   Prior Function   Level of [place] Independent with ADLs;Needs assistance with IADLS;Independent with functional mobility   Lives With Alone   Receives Help From Family;Other (Comment)  (son helps with shopping)   IADLs Family/Friend/Other provides transportation;Family/Friend/Other provides meals   Falls in the last 6 months 0   Vocational Retired   Comments Pt reports she has a history of CVA and favors her left side. Feels concerned that her R side was her strong and reliable side and now her R foot is compromised.   General   Additional Pertinent History Pt is s/p R great toe amp   Family/Caregiver Present No   Cognition   Overall Cognitive Status WFL   Arousal/Participation Alert   Attention Within functional limits   Orientation Level Oriented X4;Oriented to person;Oriented to place;Oriented to time;Oriented to situation   Memory Within functional limits   Following Commands Follows one step commands with increased time or repetition   Comments slow processing but receptive to information   Subjective   Subjective \"My whole spine hurts\" "   RUE Assessment   RUE Assessment WFL   LUE Assessment   LUE Assessment WFL   RLE Assessment   RLE Assessment WFL   LLE Assessment   LLE Assessment WFL   Light Touch   RLE Light Touch Grossly intact   LLE Light Touch Grossly intact   Bed Mobility   Supine to Sit 4  Minimal assistance   Additional items Assist x 1;HOB elevated;Bedrails;Increased time required;Verbal cues;LE management  Pt requires max assist for donning and doffing of R Darco wedge shoe.    Transfers   Sit to Stand 4  Minimal assistance   Additional items Assist x 1;Armrests;Increased time required;Verbal cues   Stand to Sit 4  Minimal assistance   Additional items Assist x 1;Armrests;Increased time required;Verbal cues   Ambulation/Elevation   Gait pattern Antalgic  (short uneven step lengths, difficulty weight bearing on RLE despite Darco shoe and cues for BUE offloading on RW. slow sequencing)   Gait Assistance 4  Minimal assist   Additional items Assist x 1;Verbal cues;Tactile cues   Assistive Device Rolling walker  (R Darco shoe)   Distance 8ft   Balance   Static Sitting Good   Dynamic Sitting Fair +   Static Standing Fair   Dynamic Standing Fair -   Ambulatory Fair -   Activity Tolerance   Activity Tolerance Patient limited by fatigue;Patient limited by pain   Nurse Made Aware RN Yashira   Assessment   Prognosis Good   Problem List Decreased strength;Decreased range of motion;Decreased endurance;Impaired balance;Decreased mobility;Decreased coordination;Decreased safety awareness;Pain;Orthopedic restrictions   Assessment Finn Oleary is a 68 y.o. Female who presents to [facility] on 6/5/24 from home w/ osteomyelitis of R great toe requiring amputation of the toe. Pt was issued a Darco wedge shoe 3 days ago and orders for PT eval and treat received for post op mobilization, w/ activity orders of up and out of bed as tolerated, fall precautions, and heel touch weight bearing using Darco wedge shoe Pt presents w/ comorbidities of celluitis, hepatic  encephalopathy, h/o alcohol substance use, AMS, and amputation of R greater toe. At baseline, pt mobilizes independently w/ a walker or cane, and reports 0 falls in the last 6 months. Upon evaluation, pt presents w/ the following deficits: weakness, decreased ROM, impaired coordination, impaired skin integrity, impaired balance, decreased endurance, and pain limiting functional mobility. Pt currently demonstrates min assist for bed mobility, min assist for transfers, and min assist with RW and Darco wedge shoe for ambulation. The patient's [facility] Basic Mobility Inpatient Short Form Raw Score is 15. A Raw score of less than or equal to 17 suggests the patient may benefit from discharge to post-acute rehabilitation services. Please also refer to the recommendation of the Physical Therapist for safe discharge planning. Based on current status, Level 2 rehab intensity is recommended at time of discharge. At this time, pt would benefit from continued skilled PT services, in the acute care setting, in order to address the abovementioned deficits and maximize independence and functional gains prior to discharge.   Barriers to Discharge Inaccessible home environment;Decreased caregiver support  (functional status)   Goals   Patient Goals to be independent, have less pain   STG Expiration Date 06/28/24   Short Term Goal #1 1. supine to sit independently. 2. sit to stand independently. 3. ambulate with RW >150ft modified independently. 4. ascend/ descend 10 steps with handrail and cane modified independently.   PT Treatment Day 1   Plan   Treatment/Interventions Functional transfer training;LE strengthening/ROM;Elevations;Therapeutic exercise;Endurance training;Patient/family training;Bed mobility;Gait training;Compensatory technique education;Spoke to nursing   PT Frequency 3-5x/wk   Discharge Recommendation   Rehab Resource Intensity Level, PT II (Moderate Resource Intensity)   AM-PAC Basic Mobility Inpatient   Turning in  Flat Bed Without Bedrails 3   Lying on Back to Sitting on Edge of Flat Bed Without Bedrails 3   Moving Bed to Chair 3   Standing Up From Chair Using Arms 3   Walk in Room 2   Climb 3-5 Stairs With Railing 1   Basic Mobility Inpatient Raw Score 15   Basic Mobility Standardized Score 36.97   [facility] Highest Level Of Mobility   -[facility] Goal 4: Move to chair/commode   -HLM Achieved 6: Walk 10 steps or more   Additional Treatment Session   Start Time 1230   End Time 1242   Treatment Assessment Pt transferred sit to stand min assist and RW. Pt performed stand pivot from EOB to bedside commode with min assist and cues for sequencing, hand placement and technique. Following use of commode, pt assisted with self care and hygiene. Pt then transferred sit to stand wiht min assist. Pt ambulated x 4ft with min assist in slow, antalgic, step to pattern. Pt transferred stand to sit with min assist and transferred sit to supine with min assist for BLE management. Pt repositioned with RLE elevated on pillows and Darco shoe removed, set up with lunch tray. RN notified.   Equipment Use RW, bedside commode.   Additional Treatment Day 1   End of Consult   Patient Position at End of Consult Supine;Bed/Chair alarm activated;All needs within reach     Jamshid Roe, PT

## 2024-06-10 NOTE — ASSESSMENT & PLAN NOTE
1 out of 2 blood culture results 6/7 revealing Staphylococcus  Repeat cultures no growth at 72 hours  Most likely contaminant

## 2024-06-10 NOTE — ASSESSMENT & PLAN NOTE
Recent Labs     06/08/24  0225 06/09/24  0418 06/10/24  0458   PLT 93* 80* 84*        Platelets 119 K on admit  Secondary to cirrhosis of the liver  DVT prophylaxis with heparin   Would not dc unless Pl are < 50.000

## 2024-06-10 NOTE — PLAN OF CARE
Problem: Potential for Falls  Goal: Patient will remain free of falls  Description: INTERVENTIONS:  - Educate patient/family on patient safety including physical limitations  - Instruct patient to call for assistance with activity   - Consult OT/PT to assist with strengthening/mobility   - Keep Call bell within reach  - Keep bed low and locked with side rails adjusted as appropriate  - Keep care items and personal belongings within reach  - Initiate and maintain comfort rounds  - Make Fall Risk Sign visible to staff  - Offer Toileting every 2 Hours, in advance of need  - Initiate/Maintain alarm  - Obtain necessary fall risk management equipment:   - Apply yellow socks and bracelet for high fall risk patients  - Consider moving patient to room near nurses station  Outcome: Progressing     Problem: PAIN - ADULT  Goal: Verbalizes/displays adequate comfort level or baseline comfort level  Description: Interventions:  - Encourage patient to monitor pain and request assistance  - Assess pain using appropriate pain scale  - Administer analgesics based on type and severity of pain and evaluate response  - Implement non-pharmacological measures as appropriate and evaluate response  - Consider cultural and social influences on pain and pain management  - Notify physician/advanced practitioner if interventions unsuccessful or patient reports new pain  Outcome: Progressing     Problem: INFECTION - ADULT  Goal: Absence or prevention of progression during hospitalization  Description: INTERVENTIONS:  - Assess and monitor for signs and symptoms of infection  - Monitor lab/diagnostic results  - Monitor all insertion sites, i.e. indwelling lines, tubes, and drains  - Monitor endotracheal if appropriate and nasal secretions for changes in amount and color  - [place] appropriate cooling/warming therapies per order  - Administer medications as ordered  - Instruct and encourage patient and family to use good hand hygiene  technique  - Identify and instruct in appropriate isolation precautions for identified infection/condition  Outcome: Progressing  Goal: Absence of fever/infection during neutropenic period  Description: INTERVENTIONS:  - Monitor WBC    Outcome: Progressing     Problem: SAFETY ADULT  Goal: Patient will remain free of falls  Description: INTERVENTIONS:  - Educate patient/family on patient safety including physical limitations  - Instruct patient to call for assistance with activity   - Consult OT/PT to assist with strengthening/mobility   - Keep Call bell within reach  - Keep bed low and locked with side rails adjusted as appropriate  - Keep care items and personal belongings within reach  - Initiate and maintain comfort rounds  - Make Fall Risk Sign visible to staff  - Offer Toileting every 2 Hours, in advance of need  - Initiate/Maintain alarm  - Obtain necessary fall risk management equipment:   - Apply yellow socks and bracelet for high fall risk patients  - Consider moving patient to room near nurses station  Outcome: Progressing  Goal: Maintain or return to baseline ADL function  Description: INTERVENTIONS:  - Educate patient/family on patient safety including physical limitations  - Instruct patient to call for assistance with activity   - Consult OT/PT to assist with strengthening/mobility   - Keep Call bell within reach  - Keep bed low and locked with side rails adjusted as appropriate  - Keep care items and personal belongings within reach  - Initiate and maintain comfort rounds  - Make Fall Risk Sign visible to staff  - Offer Toileting every 2 Hours, in advance of need  - Initiate/Maintain alarm  - Obtain necessary fall risk management equipment:   - Apply yellow socks and bracelet for high fall risk patients  - Consider moving patient to room near nurses station  Outcome: Progressing  Goal: Maintains/Returns to pre admission functional level  Description: INTERVENTIONS:  - Perform AM-PAC 6 Click Basic  Mobility/ Daily Activity assessment daily.  - Set and communicate daily mobility goal to care team and patient/family/caregiver.   - Collaborate with rehabilitation services on mobility goals if consulted  - Perform Range of Motion x times a day.  - Reposition patient every x hours.  - Dangle patient x times a day  - Stand patient x times a day  - Ambulate patient x times a day  - Out of bed to chair xx times a day   - Out of bed for meals x times a day  - Out of bed for toileting  - Record patient progress and toleration of activity level   Outcome: Progressing     Problem: DISCHARGE PLANNING  Goal: Discharge to home or other facility with appropriate resources  Description: INTERVENTIONS:  - Identify barriers to discharge w/patient and caregiver  - Arrange for needed discharge resources and transportation as appropriate  - Identify discharge learning needs (meds, wound care, etc.)  - Arrange for interpretive services to assist at discharge as needed  - Refer to Case Management Department for coordinating discharge planning if the patient needs post-hospital services based on physician/advanced practitioner order or complex needs related to functional status, cognitive ability, or social support system  Outcome: Progressing     Problem: Knowledge Deficit  Goal: Patient/family/caregiver demonstrates understanding of disease process, treatment plan, medications, and discharge instructions  Description: Complete learning assessment and assess knowledge base.  Interventions:  - Provide teaching at level of understanding  - Provide teaching via preferred learning methods  Outcome: Progressing     Problem: Prexisting or High Potential for Compromised Skin Integrity  Goal: Skin integrity is maintained or improved  Description: INTERVENTIONS:  - Identify patients at risk for skin breakdown  - Assess and monitor skin integrity  - Assess and monitor nutrition and hydration status  - Monitor labs   - Assess for incontinence    - Turn and reposition patient  - Assist with mobility/ambulation  - Relieve pressure over bony prominences  - Avoid friction and shearing  - Provide appropriate hygiene as needed including keeping skin clean and dry  - Evaluate need for skin moisturizer/barrier cream  - Collaborate with interdisciplinary team   - Patient/family teaching  - Consider wound care consult   Outcome: Progressing

## 2024-06-11 LAB — BACTERIA BLD CULT: NORMAL

## 2024-06-12 LAB
BACTERIA BLD CULT: NORMAL
BACTERIA BLD CULT: NORMAL

## 2024-06-12 PROCEDURE — 88311 DECALCIFY TISSUE: CPT | Performed by: PATHOLOGY

## 2024-06-12 PROCEDURE — 88305 TISSUE EXAM BY PATHOLOGIST: CPT | Performed by: PATHOLOGY

## 2024-06-13 ENCOUNTER — OFFICE VISIT (OUTPATIENT)
Dept: PODIATRY | Facility: CLINIC | Age: 69
End: 2024-06-13
Payer: MEDICARE

## 2024-06-13 VITALS
BODY MASS INDEX: 21.72 KG/M2 | DIASTOLIC BLOOD PRESSURE: 68 MMHG | HEART RATE: 61 BPM | HEIGHT: 63 IN | SYSTOLIC BLOOD PRESSURE: 136 MMHG

## 2024-06-13 DIAGNOSIS — M86.9 OSTEOMYELITIS OF GREAT TOE OF RIGHT FOOT (HCC): Primary | ICD-10-CM

## 2024-06-13 DIAGNOSIS — Z89.411 STATUS POST AMPUTATION OF RIGHT GREAT TOE (HCC): ICD-10-CM

## 2024-06-13 PROCEDURE — 99213 OFFICE O/P EST LOW 20 MIN: CPT | Performed by: PODIATRIST

## 2024-06-13 NOTE — PROGRESS NOTES
Assessment/Plan:   POD #6 S/P RGT Amputation(IPJ)  Hospital records (x-rays, MRI, progress notes) reviewed.  Dry sterile dressing with Adaptic applied right foot.  Continue with forefoot wedge offloading shoe partial weightbearing with crutches.  Follow-up in 2 weeks with Dr. Baldwin for suture removal  Complete 5-day course of Ceftin and doxycycline as prescribed upon discharge.  Call if any signs of infection are noted.     Diagnoses and all orders for this visit:    Osteomyelitis of great toe of right foot (HCC)  -     Ambulatory Referral to Podiatry    Status post amputation of right great toe (HCC)          Subjective:     Patient ID: Finn Oleary is a 68 y.o. female.    HPI    Review of Systems   Constitutional: Negative.    HENT: Negative.     Eyes: Negative.    Respiratory: Negative.     Cardiovascular: Negative.    Gastrointestinal: Negative.    Endocrine: Negative.    Genitourinary: Negative.    Musculoskeletal:         Status post amputation right great toe   Skin:         Surgical incision right great toe stump   Allergic/Immunologic: Negative.    Neurological: Negative.    Hematological: Negative.    Psychiatric/Behavioral: Negative.           Objective:     Physical Exam  Constitutional:       Appearance: Normal appearance.   HENT:      Head: Normocephalic.      Right Ear: External ear normal.      Left Ear: External ear normal.   Eyes:      Pupils: Pupils are equal, round, and reactive to light.   Cardiovascular:      Rate and Rhythm: Normal rate.      Pulses:           Dorsalis pedis pulses are 0 on the right side and 0 on the left side.        Posterior tibial pulses are 0 on the right side and 0 on the left side.   Pulmonary:      Effort: Pulmonary effort is normal.   Musculoskeletal:         General: Deformity present.      Cervical back: Normal range of motion.      Right foot: Decreased range of motion.      Right Lower Extremity: Right leg is amputated below ankle. (Right great toe  interphalangeal joint)  Feet:      Right foot:      Protective Sensation: 10 sites tested.  10 sites sensed.      Skin integrity: Skin integrity normal.      Left foot:      Protective Sensation: 10 sites tested.  10 sites sensed.      Skin integrity: Skin integrity normal.   Skin:     General: Skin is warm and dry.      Capillary Refill: Capillary refill takes 2 to 3 seconds.      Comments: Surgical incision right great toe amputation(interphalangeal joint) is coapting satisfactorily.  No signs of wound dehiscence or infection.  Sutures intact.   Neurological:      Mental Status: She is alert and oriented to person, place, and time.      Sensory: Sensory deficit present.   Psychiatric:         Mood and Affect: Mood normal.

## 2024-06-21 ENCOUNTER — OFFICE VISIT (OUTPATIENT)
Dept: PODIATRY | Facility: CLINIC | Age: 69
End: 2024-06-21
Payer: MEDICARE

## 2024-06-21 VITALS
SYSTOLIC BLOOD PRESSURE: 116 MMHG | BODY MASS INDEX: 20.55 KG/M2 | HEIGHT: 63 IN | WEIGHT: 116 LBS | DIASTOLIC BLOOD PRESSURE: 67 MMHG | HEART RATE: 52 BPM

## 2024-06-21 DIAGNOSIS — M86.9 OSTEOMYELITIS OF GREAT TOE OF RIGHT FOOT (HCC): ICD-10-CM

## 2024-06-21 DIAGNOSIS — Z89.411 STATUS POST AMPUTATION OF RIGHT GREAT TOE (HCC): Primary | ICD-10-CM

## 2024-06-21 PROCEDURE — 99213 OFFICE O/P EST LOW 20 MIN: CPT | Performed by: PODIATRIST

## 2024-06-21 RX ORDER — PHENOL 1.4 %
AEROSOL, SPRAY (ML) MUCOUS MEMBRANE
COMMUNITY

## 2024-06-21 NOTE — PROGRESS NOTES
Assessment/Plan:   POD #7 S/P RGT Amputation(IPJ)  Hospital records (x-rays, MRI, progress notes) reviewed.  Suture removal, Steri-Strips applied with dry sterile dressing with Adaptic right foot.  Continue with forefoot wedge offloading shoe partial weightbearing with crutches.  Follow-up in 1 month with Dr. Baldwin   Call if any signs of infection are noted.     Diagnoses and all orders for this visit:    Status post amputation of right great toe (HCC)    Osteomyelitis of great toe of right foot (HCC)    Other orders  -     Melatonin 10 MG TABS; Take by mouth  -     magnesium hydroxide (MILK OF MAGNESIA) 400 mg/5 mL oral suspension; Take by mouth daily as needed for constipation        Subjective:   Patient ID: Finn Oleary is a 68 y.o. female.    6/21/2024: 68-year-old female seen today for POV #7 status post amputation of right great toe.  Still reports having some pain and discomfort.  Overall progressing satisfactorily.  Ambulated into the office using forefoot wedge shoe and wheelchair.        Review of Systems   Constitutional: Negative.    HENT: Negative.     Eyes: Negative.    Respiratory: Negative.     Cardiovascular: Negative.    Gastrointestinal: Negative.    Endocrine: Negative.    Genitourinary: Negative.    Musculoskeletal:         Status post amputation right great toe   Skin:         Surgical incision right great toe stump   Allergic/Immunologic: Negative.    Neurological: Negative.    Hematological: Negative.    Psychiatric/Behavioral: Negative.           Objective:     Physical Exam  Constitutional:       Appearance: Normal appearance.   HENT:      Head: Normocephalic.   Cardiovascular:      Rate and Rhythm: Normal rate.      Pulses:           Dorsalis pedis pulses are 0 on the right side and 0 on the left side.        Posterior tibial pulses are 0 on the right side and 0 on the left side.   Pulmonary:      Effort: Pulmonary effort is normal.   Musculoskeletal:         General: Deformity  present.      Cervical back: Normal range of motion.      Right foot: Decreased range of motion.      Right Lower Extremity: Right leg is amputated below ankle. (Right great toe interphalangeal joint)  Feet:      Right foot:      Protective Sensation: 10 sites tested.  10 sites sensed.      Skin integrity: Skin integrity normal.      Left foot:      Protective Sensation: 10 sites tested.  10 sites sensed.      Skin integrity: Skin integrity normal.   Skin:     General: Skin is warm and dry.      Capillary Refill: Capillary refill takes 2 to 3 seconds.      Comments: Surgical incision right great toe amputation(interphalangeal joint) is coapting satisfactorily.  No signs of wound dehiscence or infection.  Sutures intact with wound healing complete.   Neurological:      Mental Status: She is alert and oriented to person, place, and time.      Sensory: Sensory deficit present.   Psychiatric:         Mood and Affect: Mood normal.            MOLECULAR PCR

## 2024-06-28 ENCOUNTER — OFFICE VISIT (OUTPATIENT)
Dept: PODIATRY | Facility: CLINIC | Age: 69
End: 2024-06-28
Payer: MEDICARE

## 2024-06-28 VITALS
SYSTOLIC BLOOD PRESSURE: 114 MMHG | DIASTOLIC BLOOD PRESSURE: 65 MMHG | WEIGHT: 116 LBS | BODY MASS INDEX: 20.55 KG/M2 | HEIGHT: 63 IN | HEART RATE: 55 BPM

## 2024-06-28 DIAGNOSIS — Z89.411 STATUS POST AMPUTATION OF RIGHT GREAT TOE (HCC): Primary | ICD-10-CM

## 2024-06-28 PROCEDURE — 99213 OFFICE O/P EST LOW 20 MIN: CPT | Performed by: PODIATRIST

## 2024-06-28 NOTE — PROGRESS NOTES
"Patient ID: Finn Oleary is a 68 y.o. female Date of Birth 1955       Chief Complaint   Patient presents with    Foot Problem     PO 3 weeks             Diagnosis:  1. Status post amputation of right great toe (HCC)    Bilateral pedal examination with socks and shoes removed.  Right great toe is well-healed, she may discontinue all dressings, transition to regular sneakers and discontinue surgical shoe.  Patient may increase activities as tolerated.  Today reviewed proper at risk footcare, shoe gear, daily foot inspection, no barefoot, no flip-flops.  Patient understands and agrees with the plan will follow-up in 1 month          Subjective:   Finn presents today status post right hallux partial amputation on 6/7/2024 for osteomyelitis of distal phalanx, she was seen by Dr. Hernandez last week, stitches were removed, she states  she is doing well and hopes to be discharged from [facility] this week.          The following portions of the patient's history were reviewed and updated as appropriate: allergies, current medications, past family history, past medical history, past social history, past surgical history, and problem list.        Objective:  /65 (BP Location: Right arm, Patient Position: Sitting, Cuff Size: Adult)   Pulse 55   Ht 5' 3\" (1.6 m) Comment: verbal  Wt 52.6 kg (116 lb) Comment: verbal  LMP  (LMP Unknown)   BMI 20.55 kg/m²     Review of Systems   Constitutional:  Negative for chills and fever.   HENT:  Negative for ear pain and sore throat.    Eyes:  Negative for pain and visual disturbance.   Respiratory:  Negative for cough and shortness of breath.    Cardiovascular:  Negative for chest pain and palpitations.   Gastrointestinal:  Negative for abdominal pain and vomiting.   Genitourinary:  Negative for dysuria and hematuria.   Musculoskeletal:  Positive for gait problem. Negative for arthralgias and back pain.        Right great toe amputation   Skin:  Negative for color " change and rash.   Neurological:  Negative for seizures and syncope.   All other systems reviewed and are negative.      Physical Exam  Constitutional:       Appearance: Normal appearance. She is well-developed and normal weight.   HENT:      Head: Normocephalic and atraumatic.      Nose: Nose normal.      Mouth/Throat:      Mouth: Mucous membranes are moist.      Pharynx: Oropharynx is clear.   Eyes:      Conjunctiva/sclera: Conjunctivae normal.      Pupils: Pupils are equal, round, and reactive to light.   Cardiovascular:      Pulses:           Dorsalis pedis pulses are 2+ on the right side and 2+ on the left side.        Posterior tibial pulses are 2+ on the right side and 2+ on the left side.   Pulmonary:      Effort: Pulmonary effort is normal.   Musculoskeletal:         General: Normal range of motion.      Cervical back: Normal range of motion.      Right lower leg: No edema.      Left lower leg: No edema.   Feet:      Right foot:      Protective Sensation: 10 sites tested.  10 sites sensed.      Skin integrity: Skin integrity normal.      Toenail Condition: Right toenails are normal.      Left foot:      Protective Sensation: 10 sites tested.  10 sites sensed.      Skin integrity: Skin integrity normal.      Toenail Condition: Left toenails are normal.      Comments: Right great toe partial IPJ amputation site is well-healed    Skin:     General: Skin is warm and dry.   Neurological:      Mental Status: She is alert and oriented to person, place, and time.   Psychiatric:         Behavior: Behavior normal.         Thought Content: Thought content normal.         Judgment: Judgment normal.              XR foot right 3+ views    Result Date: 6/10/2024  Narrative: XR FOOT 3+ VW RIGHT INDICATION: Status post partial right hallux amputation. COMPARISON: 06/05/2024 FINDINGS: No acute fracture or dislocation. The distal phalanx of the great toe has been amputated. No significant degenerative changes. No lytic or  blastic osseous lesion. Unremarkable soft tissues.     Impression: No acute osseous abnormality. The distal phalanx of the great toe has been amputated. Workstation performed: [id number]     XR foot 3+ views RIGHT    Result Date: 6/6/2024  Narrative: XR FOOT 3+ VW RIGHT INDICATION: Right foot infection. COMPARISON: None FINDINGS: No acute fracture or dislocation. No significant degenerative changes. Diminished cortical conspicuity of the distal tuft first phalanx, suspicious for osteomyelitis. This is best seen on the lateral view. Soft tissue ulceration tip of the first toe.     Impression: Diminished cortical conspicuity distal tuft first phalanx with overlying soft tissue ulceration, findings suspicious for osteomyelitis. Workstation performed: [id number]         MRI foot/forefoot toest right wo contrast    Result Date: 6/6/2024  Narrative: MRI FOOT/FOREFOOT TOES RIGHT WO CONTRAST INDICATION:   Concern for osteomyelitis right great toe. COMPARISON: Radiograph 6/5/2024 TECHNIQUE:  Multiplanar/multisequence MR of the right foot was performed. FINDINGS: SUBCUTANEOUS TISSUES: Ulceration of the first distal phalanx. Nonspecific dorsal forefoot subcutaneous edema. No evidence of fluid collection. BONES: There is confluently hypointense T1 weighted marrow, considered definite osteomyelitis, extending for 0.5 cm proximal from the tip of the first distal phalangeal. Less specific marrow edema on T2 weighted sequences extend for 0.5 cm. FIRST MTP JOINT:  Intact. SESAMOID BONES:  Intact. OTHER ARTICULAR SURFACE:  Normal. PLANTAR FASCIA:  Intact. LISFRANC LIGAMENT:  Intact. FOREFOOT TENDONS: Intact. INTERMETATARSAL REGIONS: No bursitis or Jansen's neuroma. MUSCULATURE: Intact.     Impression: Findings consistent with osteomyelitis of the tuft of the first distal phalanx. Workstation performed: [id number]         Niru Baldwin DPM, LUCINA, FACFAS    Portions of the record may have been created with voice recognition software.  "Occasional wrong word or \"sound a like\" substitutions may have occurred due to the inherent limitations of voice recognition software. Read the chart carefully and recognize, using context, where substitutions have occurred.  "

## 2024-07-08 ENCOUNTER — APPOINTMENT (EMERGENCY)
Dept: CT IMAGING | Facility: HOSPITAL | Age: 69
End: 2024-07-08
Payer: MEDICARE

## 2024-07-08 ENCOUNTER — APPOINTMENT (EMERGENCY)
Dept: RADIOLOGY | Facility: HOSPITAL | Age: 69
End: 2024-07-08
Payer: MEDICARE

## 2024-07-08 ENCOUNTER — HOSPITAL ENCOUNTER (EMERGENCY)
Facility: HOSPITAL | Age: 69
Discharge: HOME | End: 2024-07-08
Attending: EMERGENCY MEDICINE | Admitting: EMERGENCY MEDICINE
Payer: MEDICARE

## 2024-07-08 DIAGNOSIS — F10.929 ALCOHOL INTOXICATION (HCC): ICD-10-CM

## 2024-07-08 DIAGNOSIS — R91.1 PULMONARY NODULE: ICD-10-CM

## 2024-07-08 DIAGNOSIS — R93.2 ABNORMAL CT OF LIVER: ICD-10-CM

## 2024-07-08 DIAGNOSIS — S50.812A ABRASION OF LEFT FOREARM, INITIAL ENCOUNTER: ICD-10-CM
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
## 2024-07-08 LAB
ALBUMIN SERPL BCG-MCNC: 3.3 G/DL (ref 3.5–5)
ALP SERPL-CCNC: 106 U/L (ref 34–104)
ALT SERPL W P-5'-P-CCNC: 41 U/L (ref 7–52)
AMMONIA PLAS-SCNC: 39 UMOL/L (ref 18–72)
AMPHETAMINES SERPL QL SCN: NEGATIVE
ANION GAP SERPL CALCULATED.3IONS-SCNC: 4 MMOL/L (ref 4–13)
AST SERPL W P-5'-P-CCNC: 57 U/L (ref 13–39)
BARBITURATES UR QL: NEGATIVE
BASOPHILS # BLD AUTO: 0.03 THOUSANDS/ÂΜL (ref 0–0.1)
BASOPHILS NFR BLD AUTO: 1 % (ref 0–1)
BENZODIAZ UR QL: POSITIVE
BILIRUB SERPL-MCNC: 1.33 MG/DL (ref 0.2–1)
BUN SERPL-MCNC: 11 MG/DL (ref 5–25)
CALCIUM ALBUM COR SERPL-MCNC: 11.1 MG/DL (ref 8.3–10.1)
CALCIUM SERPL-MCNC: 10.5 MG/DL (ref 8.4–10.2)
CHLORIDE SERPL-SCNC: 102 MMOL/L (ref 96–108)
CK SERPL-CCNC: 55 U/L (ref 26–192)
CO2 SERPL-SCNC: 30 MMOL/L (ref 21–32)
COCAINE UR QL: NEGATIVE
CREAT SERPL-MCNC: 0.68 MG/DL (ref 0.6–1.3)
EOSINOPHIL # BLD AUTO: 0.1 THOUSAND/ÂΜL (ref 0–0.61)
EOSINOPHIL NFR BLD AUTO: 2 % (ref 0–6)
ERYTHROCYTE [DISTWIDTH] IN BLOOD BY AUTOMATED COUNT: 11.7 % (ref 11.6–15.1)
ETHANOL SERPL-MCNC: 145 MG/DL
FENTANYL UR QL SCN: NEGATIVE
GFR SERPL CREATININE-BSD FRML MDRD: 89 ML/MIN/1.73SQ M
GLUCOSE SERPL-MCNC: 88 MG/DL (ref 65–140)
HCT VFR BLD AUTO: 36.8 % (ref 34.8–46.1)
HGB BLD-MCNC: 12.5 G/DL (ref 11.5–15.4)
HYDROCODONE UR QL SCN: NEGATIVE
IMM GRANULOCYTES # BLD AUTO: 0.01 THOUSAND/UL (ref 0–0.2)
IMM GRANULOCYTES NFR BLD AUTO: 0 % (ref 0–2)
LIPASE SERPL-CCNC: 36 U/L (ref 11–82)
LYMPHOCYTES # BLD AUTO: 1.99 THOUSANDS/ÂΜL (ref 0.6–4.47)
LYMPHOCYTES NFR BLD AUTO: 43 % (ref 14–44)
MCH RBC QN AUTO: 32.5 PG (ref 26.8–34.3)
MCHC RBC AUTO-ENTMCNC: 34 G/DL (ref 31.4–37.4)
MCV RBC AUTO: 96 FL (ref 82–98)
METHADONE UR QL: NEGATIVE
MONOCYTES # BLD AUTO: 0.39 THOUSAND/ÂΜL (ref 0.17–1.22)
MONOCYTES NFR BLD AUTO: 8 % (ref 4–12)
NEUTROPHILS # BLD AUTO: 2.15 THOUSANDS/ÂΜL (ref 1.85–7.62)
NEUTS SEG NFR BLD AUTO: 46 % (ref 43–75)
NRBC BLD AUTO-RTO: 0 /100 WBCS
OPIATES UR QL SCN: POSITIVE
OXYCODONE+OXYMORPHONE UR QL SCN: POSITIVE
PCP UR QL: NEGATIVE
PLATELET # BLD AUTO: 113 THOUSANDS/UL (ref 149–390)
POTASSIUM SERPL-SCNC: 3.7 MMOL/L (ref 3.5–5.3)
PROT SERPL-MCNC: 5.9 G/DL (ref 6.4–8.4)
RBC # BLD AUTO: 3.85 MILLION/UL (ref 3.81–5.12)
SODIUM SERPL-SCNC: 136 MMOL/L (ref 135–147)
THC UR QL: NEGATIVE
WBC # BLD AUTO: 4.67 THOUSAND/UL (ref 4.31–10.16)

## 2024-07-08 PROCEDURE — 96365 THER/PROPH/DIAG IV INF INIT: CPT

## 2024-07-08 PROCEDURE — 36415 COLL VENOUS BLD VENIPUNCTURE: CPT | Performed by: EMERGENCY MEDICINE

## 2024-07-08 PROCEDURE — 70450 CT HEAD/BRAIN W/O DYE: CPT

## 2024-07-08 PROCEDURE — 71045 X-RAY EXAM CHEST 1 VIEW: CPT

## 2024-07-08 PROCEDURE — 74177 CT ABD & PELVIS W/CONTRAST: CPT

## 2024-07-08 PROCEDURE — 80053 COMPREHEN METABOLIC PANEL: CPT | Performed by: EMERGENCY MEDICINE

## 2024-07-08 PROCEDURE — 82140 ASSAY OF AMMONIA: CPT | Performed by: EMERGENCY MEDICINE

## 2024-07-08 PROCEDURE — 85025 COMPLETE CBC W/AUTO DIFF WBC: CPT | Performed by: EMERGENCY MEDICINE

## 2024-07-08 PROCEDURE — 73610 X-RAY EXAM OF ANKLE: CPT

## 2024-07-08 PROCEDURE — 96361 HYDRATE IV INFUSION ADD-ON: CPT

## 2024-07-08 PROCEDURE — 99285 EMERGENCY DEPT VISIT HI MDM: CPT | Performed by: EMERGENCY MEDICINE

## 2024-07-08 PROCEDURE — 82550 ASSAY OF CK (CPK): CPT | Performed by: EMERGENCY MEDICINE

## 2024-07-08 PROCEDURE — 83690 ASSAY OF LIPASE: CPT | Performed by: EMERGENCY MEDICINE

## 2024-07-08 PROCEDURE — 80307 DRUG TEST PRSMV CHEM ANLYZR: CPT | Performed by: EMERGENCY MEDICINE

## 2024-07-08 PROCEDURE — 71260 CT THORAX DX C+: CPT

## 2024-07-08 PROCEDURE — 82077 ASSAY SPEC XCP UR&BREATH IA: CPT | Performed by: EMERGENCY MEDICINE

## 2024-07-08 PROCEDURE — 93005 ELECTROCARDIOGRAM TRACING: CPT

## 2024-07-08 PROCEDURE — 72125 CT NECK SPINE W/O DYE: CPT

## 2024-07-08 PROCEDURE — 99285 EMERGENCY DEPT VISIT HI MDM: CPT

## 2024-07-08 RX ADMIN — IOHEXOL 100 ML: 350 INJECTION, SOLUTION INTRAVENOUS at 20:28

## 2024-07-08 RX ADMIN — SODIUM CHLORIDE 1000 ML: 0.9 INJECTION, SOLUTION INTRAVENOUS at 20:08

## 2024-07-08 RX ADMIN — FOLIC ACID: 5 INJECTION, SOLUTION INTRAMUSCULAR; INTRAVENOUS; SUBCUTANEOUS at 20:07

## 2024-07-08 NOTE — ED PROVIDER NOTES
"Emergency Department Trauma Note  Finn Oleary 68 y.o. female MRN: [id number]  Unit/Bed#: ED TR13/TR13A Encounter: [id number]      Trauma Alert: Trauma Acuity: Trauma Evaluation  Model of Arrival: Mode of Arrival: BLS via    Trauma Team: Current Providers  Attending Provider: Charlene Osei MD  Registered Nurse: Mirian Vasquez  Registered Nurse: Yashira Bang RN  Consultants:     None      History of Present Illness     Chief Complaint:   Chief Complaint   Patient presents with    Fall     Pt states her \"legs gave out\" while she was standing.  Denies head strike/thinners/LOC.  Pt endorses ETOH use but unsure on amount.      HPI:  Finn Oleary is a 68 y.o. female who presents with fall.  Mechanism:Details of Incident: Pt states she was standing and her legs gave out.  Endorses ETOH use.  Denies head strike/thinners/LOC. Injury Date: 07/08/24        68 year old female presents for evaluation after fall.  Patient was going outside to have a cigarette when she fell after losing her balance.  Patient states she wasn't able to get up for 45 minutes.  She states she has fibromyalgia and always has pain.  She states she drank half a beer today.  She states she hasn't taken her lactulose in 2 days.      Fall    Review of Systems    Historical Information     Immunizations:   There is no immunization history on file for this patient.    Past Medical History:   Diagnosis Date    Anemia     Anxiety     Ascites     Cervical cancer (HCC)     Chronic pain     Cirrhosis (HCC)     CVA (cerebral vascular accident) (HCC)     Depression     Fibromyalgia     Hepatic encephalopathy (HCC)     Opioid dependence in remission (HCC) 11/7/2022       Family History   Problem Relation Age of Onset    Lung cancer Mother     Cirrhosis Father     Cancer Sister     Colon polyps Neg Hx     Colon cancer Neg Hx      Past Surgical History:   Procedure Laterality Date    COLONOSCOPY  07/14/2015    COLONOSCOPY  03/15/2013    " Hemorrhoids    EGD  12/23/2014    HYSTERECTOMY      VT AMPUTATION TOE INTERPHALANGEAL JOINT Right 6/7/2024    Procedure: AMPUTATION RIGHT GREAT TOE;  Surgeon: Niru Baldwin DPM;  Location:  MAIN OR;  Service: Podiatry     Social History     Tobacco Use    Smoking status: Every Day     Passive exposure: Current    Smokeless tobacco: Never   Vaping Use    Vaping status: Never Used   Substance Use Topics    Alcohol use: Not Currently     Comment: Had been drinking anywhere from 3-5 drinks of hard alcohol daily for the past 2 years, quit 8/17     E-Cigarette/Vaping    E-Cigarette Use Never User      E-Cigarette/Vaping Substances    Nicotine No     THC No     CBD No     Flavoring No     Other No     Unknown No        Family History: non-contributory    Meds/Allergies   Prior to Admission Medications   Prescriptions Last Dose Informant Patient Reported? Taking?   ALPRAZolam (XANAX) 0.5 mg tablet   No No   Sig: take 1 tablet by mouth twice a day for anxiety.   Patient taking differently: Take 0.5 mg by mouth 2 (two) times a day take 1 tablet by mouth twice a day for anxiety. Additional PRN dose occasionally   Ipratropium-Albuterol (COMBIVENT IN)  Self Yes No   Sig: Inhale 2 (two) times a day   Patient not taking: Reported on 6/21/2024   Melatonin 10 MG TABS   Yes No   Sig: Take by mouth   carbamide peroxide (DEBROX) 6.5 % otic solution   No No   Sig: Administer 5 drops into both ears 2 (two) times a day   doxycycline (ADOXA) 100 MG tablet   No No   Sig: Take 1 tablet (100 mg total) by mouth 2 (two) times a day   Patient not taking: Reported on 6/21/2024   fluticasone-vilanterol (BREO ELLIPTA) 100-25 mcg/inh inhaler  Self Yes No   Sig: Inhale 1 puff daily Rinse mouth after use.   folic acid (FOLVITE) 1 mg tablet  Self Yes No   Sig: Take by mouth daily   furosemide (LASIX) 20 mg tablet   No No   Sig: Take 20 mg daily alternating with 40 mg daily.   gabapentin (NEURONTIN) 300 mg capsule   No No   Sig: Take 1 capsule (300  mg total) by mouth 2 (two) times a day   ipratropium (ATROVENT HFA) 17 mcg/act inhaler   No No   Sig: Inhale 2 puffs 4 (four) times a day as needed for wheezing   lactulose (CHRONULAC) 10 g/15 mL solution   No No   Sig: Take 30 mL (20 g total) by mouth 3 (three) times a day   magnesium hydroxide (MILK OF MAGNESIA) 400 mg/5 mL oral suspension   Yes No   Sig: Take by mouth daily as needed for constipation   meclizine (ANTIVERT) 12.5 MG tablet   No No   Sig: Take 1 tablet (12.5 mg total) by mouth every 8 (eight) hours as needed for dizziness   metoprolol succinate (TOPROL-XL) 25 mg 24 hr tablet  Self Yes No   Sig: Take 12.5 mg by mouth daily   nicotine (NICODERM CQ) 14 mg/24hr TD 24 hr patch   No No   Sig: Place 1 patch on the skin over 24 hours daily   Patient not taking: Reported on 6/21/2024   pantoprazole (PROTONIX) 40 mg tablet   No No   Sig: Take 1 tablet (40 mg total) by mouth daily   spironolactone (ALDACTONE) 50 mg tablet  Self Yes No   Sig: Take 50 mg by mouth 2 (two) times a day   thiamine (VITAMIN B1) 100 mg tablet   Yes No   Sig: Take 100 mg by mouth daily      Facility-Administered Medications: None       Allergies   Allergen Reactions    Gadolinium     Ibuprofen Other (See Comments)     increased BP    Nsaids     Other Palpitations     All anti depressants       PHYSICAL EXAM    PE limited by: none    Objective   Vitals:   First set: Temperature: 98.7 °F (37.1 °C) (07/08/24 1934)  Pulse: 84 (07/08/24 1934)  Respirations: 18 (07/08/24 1934)  Blood Pressure: 135/83 (07/08/24 1934)  SpO2: 98 % (07/08/24 1934)    Primary Survey:   (A) Airway: patent  (B) Breathing: bilateral breath sounds  (C) Circulation: Pulses:   normal  (D) Disabliity:  GCS Total:  15  (E) Expose:  Completed    Secondary Survey: (Click on Physical Exam tab above)  Physical Exam  Vitals and nursing note reviewed.   Constitutional:       Interventions: Cervical collar in place.   HENT:      Head: Normocephalic and atraumatic.   Eyes:       Conjunctiva/sclera: Conjunctivae normal.      Pupils: Pupils are equal, round, and reactive to light.   Cardiovascular:      Rate and Rhythm: Normal rate and regular rhythm.      Pulses: Normal pulses.      Heart sounds: Normal heart sounds.   Pulmonary:      Effort: Pulmonary effort is normal. No respiratory distress.      Breath sounds: Normal breath sounds.   Chest:      Chest wall: No tenderness or crepitus.   Abdominal:      General: There is no distension.      Palpations: Abdomen is soft.      Tenderness: There is abdominal tenderness in the right upper quadrant and right lower quadrant. There is no guarding or rebound.   Musculoskeletal:         General: No deformity.      Comments: No midline C/T/L spine tenderness. No step offs or deformities.   No pelvic tenderness or instability   Skin:     General: Skin is warm and dry.          Neurological:      Mental Status: She is alert and oriented to person, place, and time.   Psychiatric:         Speech: Speech is slurred.         Cervical spine cleared by clinical criteria? No (imaging required)      Invasive Devices       Peripheral Intravenous Line  Duration             Peripheral IV 07/08/24 Proximal;Right;Ventral (anterior) Antecubital <1 day                    Lab Results:   Results Reviewed       Procedure Component Value Units Date/Time    Rapid drug screen, urine [[id number]]  (Abnormal) Collected: 07/08/24 2111    Lab Status: Final result Specimen: Urine, Clean Catch Updated: 07/08/24 2127     Amph/Meth UR Negative     Barbiturate Ur Negative     Benzodiazepine Urine Positive     Cocaine Urine Negative     Methadone Urine Negative     Opiate Urine Positive     PCP Ur Negative     THC Urine Negative     Oxycodone Urine Positive     Fentanyl Urine Negative     HYDROCODONE URINE Negative    Narrative:      Presumptive report. If requested, specimen will be sent to reference lab for confirmation.  FOR MEDICAL PURPOSES ONLY.   IF CONFIRMATION NEEDED PLEASE  CONTACT THE LAB WITHIN 5 DAYS.    Drug Screen Cutoff Levels:  AMPHETAMINE/METHAMPHETAMINES  1000 ng/mL  BARBITURATES     200 ng/mL  BENZODIAZEPINES     200 ng/mL  COCAINE      300 ng/mL  METHADONE      300 ng/mL  OPIATES      300 ng/mL  PHENCYCLIDINE     25 ng/mL  THC       50 ng/mL  OXYCODONE      100 ng/mL  FENTANYL      5 ng/mL  HYDROCODONE     300 ng/mL    Ammonia [[id number]]  (Normal) Collected: 07/08/24 2042    Lab Status: Final result Specimen: Blood from Arm, Right Updated: 07/08/24 2107     Ammonia 39 umol/L     Comprehensive metabolic panel [[id number]]  (Abnormal) Collected: 07/08/24 2007    Lab Status: Final result Specimen: Blood from Arm, Right Updated: 07/08/24 2045     Sodium 136 mmol/L      Potassium 3.7 mmol/L      Chloride 102 mmol/L      CO2 30 mmol/L      ANION GAP 4 mmol/L      BUN 11 mg/dL      Creatinine 0.68 mg/dL      Glucose 88 mg/dL      Calcium 10.5 mg/dL      Corrected Calcium 11.1 mg/dL      AST 57 U/L      ALT 41 U/L      Alkaline Phosphatase 106 U/L      Total Protein 5.9 g/dL      Albumin 3.3 g/dL      Total Bilirubin 1.33 mg/dL      eGFR 89 ml/min/1.73sq m     Narrative:      National Kidney Disease Foundation guidelines for Chronic Kidney Disease (CKD):     Stage 1 with normal or high GFR (GFR > 90 mL/min/1.73 square meters)    Stage 2 Mild CKD (GFR = 60-89 mL/min/1.73 square meters)    Stage 3A Moderate CKD (GFR = 45-59 mL/min/1.73 square meters)    Stage 3B Moderate CKD (GFR = 30-44 mL/min/1.73 square meters)    Stage 4 Severe CKD (GFR = 15-29 mL/min/1.73 square meters)    Stage 5 End Stage CKD (GFR <15 mL/min/1.73 square meters)  Note: GFR calculation is accurate only with a steady state creatinine    CK [[id number]]  (Normal) Collected: 07/08/24 2007    Lab Status: Final result Specimen: Blood from Arm, Right Updated: 07/08/24 2045     Total CK 55 U/L     Lipase [[id number]]  (Normal) Collected: 07/08/24 2007    Lab Status: Final result Specimen: Blood from Arm, Right Updated:  07/08/24 2045     Lipase 36 u/L     Ethanol [[id number]]  (Abnormal) Collected: 07/08/24 2007    Lab Status: Final result Specimen: Blood from Arm, Right Updated: 07/08/24 2044     Ethanol Lvl 145 mg/dL     CBC and differential [[id number]]  (Abnormal) Collected: 07/08/24 2007    Lab Status: Final result Specimen: Blood from Arm, Right Updated: 07/08/24 2036     WBC 4.67 Thousand/uL      RBC 3.85 Million/uL      Hemoglobin 12.5 g/dL      Hematocrit 36.8 %      MCV 96 fL      MCH 32.5 pg      MCHC 34.0 g/dL      RDW 11.7 %      Platelets 113 Thousands/uL      nRBC 0 /100 WBCs      Segmented % 46 %      Immature Grans % 0 %      Lymphocytes % 43 %      Monocytes % 8 %      Eosinophils Relative 2 %      Basophils Relative 1 %      Absolute Neutrophils 2.15 Thousands/µL      Absolute Immature Grans 0.01 Thousand/uL      Absolute Lymphocytes 1.99 Thousands/µL      Absolute Monocytes 0.39 Thousand/µL      Eosinophils Absolute 0.10 Thousand/µL      Basophils Absolute 0.03 Thousands/µL                    Imaging Studies:   Direct to CT: No  XR ankle 3+ views LEFT   ED Interpretation by Charlene Osei MD (07/08 2145)   No acute fractures or dislocations      TRAUMA - CT head wo contrast   Final Result by Gregorio Rodriguez MD (07/08 2100)      No acute intracranial abnormality.  Chronic microangiopathic changes.                  Workstation performed: [id number]         TRAUMA - CT spine cervical wo contrast   Final Result by Gregorio Rodriguez MD (07/08 2102)      No cervical spine fracture or traumatic malalignment.                  Workstation performed: [id number]         TRAUMA - CT chest abdomen pelvis w contrast   Final Result by Gregorio Rodriguez MD (07/08 2116)      1.  No acute thoracic or abdominopelvic injury.   2.  Hepatic cirrhosis. Abnormal morphology and attenuation of the posterior right hepatic lobe may represent sequelae of prior portal venous thrombus. Contrast-enhanced MRI of the abdomen is advised  at this time to assess for a hepatic lesion.   3.  Mild pulmonary emphysema. 3 mm solid left lower lobe nodule. Based on current Fleischner Society 2017 Guidelines on incidental pulmonary nodule, optional follow-up CT at 12 months can be considered.         The study was marked in EPIC for immediate notification.      Workstation performed: [id number]         XR Trauma chest portable   ED Interpretation by Charlene Osei MD (07/08 1957)   No acute pulmonary pathology.  No displaced rib fractures.  No hemo/pneumothorax.      Final Result by Gregorio Rodriguez MD (07/08 2119)      No acute cardiopulmonary disease.            Workstation performed: [id number]               Procedures  ECG 12 Lead Documentation Only    Date/Time: 7/8/2024 7:52 PM    Performed by: Charlene Osei MD  Authorized by: Charlene Osei MD    Indications / Diagnosis:  Fall  ECG reviewed by me, the ED Provider: yes    Patient location:  ED  Previous ECG:     Previous ECG:  Compared to current    Comparison ECG info:  6/9/24 sinus bradycardia    Similarity:  No change  Interpretation:     Interpretation: normal    Rate:     ECG rate:  83    ECG rate assessment: normal    Rhythm:     Rhythm: sinus rhythm    Ectopy:     Ectopy: none    QRS:     QRS axis:  Normal    QRS intervals:  Normal  Conduction:     Conduction: normal    ST segments:     ST segments:  Normal  T waves:     T waves: flattening      Flattening:  AVL           ED Course  ED Course as of 07/08/24 2155 Mon Jul 08, 2024 2119 Platelet Count(!): 113  84 four weeks ago           Medical Decision Making  68 year old female presents for evaluation of fall.  Left forearm abrasion on exam.  Last tetanus 2020 per EMR.  No acute traumatic pathology on imaging.  EtOH level elevated.  Patient's son contacted per her request.  Patient and her son informed of incidental findings.  PCP follow up.    Amount and/or Complexity of Data Reviewed  Labs: ordered.  Decision-making details documented in ED Course.  Radiology: ordered and independent interpretation performed.    Risk  Prescription drug management.                Disposition  [facility] Transfer: No  Final diagnoses:   Abnormal CT of liver   Fall, initial encounter   Alcohol intoxication (HCC)   Pulmonary nodule   Abrasion of left forearm, initial encounter     Time reflects when diagnosis was documented in both MDM as applicable and the Disposition within this note       Time User Action Codes Description Comment    7/8/2024  9:46 PM Farnaz, Charlene J Add [R93.2] Abnormal CT of liver     7/8/2024  9:46 PM Farnaz, Charlene J Add [W19.XXXA] Fall, initial encounter     7/8/2024  9:46 PM Farnaz, Charlene J Add [F10.929] Alcohol intoxication (HCC)     7/8/2024  9:46 PM Farnaz, Charlene J Add [R91.1] Pulmonary nodule     7/8/2024  9:46 PM Farnaz, Charlene J Modify [R93.2] Abnormal CT of liver     7/8/2024  9:46 PM Farnaz, Charlene J Modify [W19.XXXA] Fall, initial encounter     7/8/2024  9:52 PM Farnaz, Charlene J Add [S50.812A] Abrasion of left forearm, initial encounter           ED Disposition       ED Disposition   Discharge    Condition   Stable    Date/Time   Mon Jul 8, 2024  9:52 PM    Comment   Finn Oleary discharge to home/self care.                   Follow-up Information       Follow up With Specialties Details Why Contact Info Additional Information    Scot Brink MD Internal Medicine Schedule an appointment as soon as possible for a visit in 1 week for re-evaluation [address] [place] [address].  [address]  [facility] [address]  [phone]        [facility] Emergency Department Emergency Medicine Go to  If symptoms worsen [address] [facility] [id number] [phone] [facility] Emergency Department, [address] [place], [place] [address]          Patient's Medications   Discharge Prescriptions    No medications on file      No discharge procedures on file.    PDMP Review         Value Time User    PDMP Reviewed  Yes 9/8/2023  3:47 PM Jomar aRmirez MD            ED Provider  Electronically Signed by           Charlene Osei MD  07/08/24 8910

## 2024-07-09 VITALS
RESPIRATION RATE: 18 BRPM | HEART RATE: 71 BPM | OXYGEN SATURATION: 93 % | DIASTOLIC BLOOD PRESSURE: 84 MMHG | BODY MASS INDEX: 20.7 KG/M2 | TEMPERATURE: 98.7 F | WEIGHT: 116.84 LBS | SYSTOLIC BLOOD PRESSURE: 126 MMHG

## 2024-07-09 LAB
ATRIAL RATE: 83 BPM
P AXIS: 77 DEGREES
PR INTERVAL: 128 MS
QRS AXIS: 72 DEGREES
QRSD INTERVAL: 82 MS
QT INTERVAL: 376 MS
QTC INTERVAL: 441 MS
T WAVE AXIS: 71 DEGREES
VENTRICULAR RATE: 83 BPM

## 2024-07-09 PROCEDURE — 93010 ELECTROCARDIOGRAM REPORT: CPT | Performed by: INTERNAL MEDICINE

## 2024-07-09 NOTE — RESULT ENCOUNTER NOTE
Called patient and informed her of irregularity to ankle and to f/u with podiatrist to r/o fracture.

## 2024-07-09 NOTE — INCIDENTAL FINDINGS
The following findings require follow up:  Radiographic finding   Finding: pulmonary nodule, abnormal ct liver   Follow up required: PCP follow up   Follow up should be done within 1 week(s)    Please notify the following clinician to assist with the follow up:   Dr. Brink    Incidental finding results were discussed with the Patient by Charlene Osei MD on 07/08/24.   They expressed understanding and all questions answered.

## 2024-07-15 ENCOUNTER — APPOINTMENT (EMERGENCY)
Dept: RADIOLOGY | Facility: HOSPITAL | Age: 69
End: 2024-07-15
Payer: MEDICARE

## 2024-07-15 ENCOUNTER — HOSPITAL ENCOUNTER (EMERGENCY)
Facility: HOSPITAL | Age: 69
Discharge: HOME | End: 2024-07-15
Attending: EMERGENCY MEDICINE
Payer: MEDICARE

## 2024-07-15 VITALS
HEART RATE: 84 BPM | OXYGEN SATURATION: 94 % | RESPIRATION RATE: 18 BRPM | SYSTOLIC BLOOD PRESSURE: 132 MMHG | DIASTOLIC BLOOD PRESSURE: 76 MMHG | TEMPERATURE: 99.2 F

## 2024-07-15 DIAGNOSIS — L03.039 CELLULITIS OF TOE: Primary | ICD-10-CM

## 2024-07-15 LAB
ALBUMIN SERPL BCG-MCNC: 3.2 G/DL (ref 3.5–5)
ALP SERPL-CCNC: 126 U/L (ref 34–104)
ALT SERPL W P-5'-P-CCNC: 32 U/L (ref 7–52)
ANION GAP SERPL CALCULATED.3IONS-SCNC: 4 MMOL/L (ref 4–13)
APTT PPP: 32 SECONDS (ref 23–37)
AST SERPL W P-5'-P-CCNC: 47 U/L (ref 13–39)
BASOPHILS # BLD AUTO: 0.03 THOUSANDS/ÂΜL (ref 0–0.1)
BASOPHILS NFR BLD AUTO: 1 % (ref 0–1)
BILIRUB SERPL-MCNC: 1.96 MG/DL (ref 0.2–1)
BUN SERPL-MCNC: 10 MG/DL (ref 5–25)
CALCIUM ALBUM COR SERPL-MCNC: 10.5 MG/DL (ref 8.3–10.1)
CALCIUM SERPL-MCNC: 9.9 MG/DL (ref 8.4–10.2)
CHLORIDE SERPL-SCNC: 99 MMOL/L (ref 96–108)
CO2 SERPL-SCNC: 34 MMOL/L (ref 21–32)
CREAT SERPL-MCNC: 0.78 MG/DL (ref 0.6–1.3)
CRP SERPL QL: 2.4 MG/L
EOSINOPHIL # BLD AUTO: 0.07 THOUSAND/ÂΜL (ref 0–0.61)
EOSINOPHIL NFR BLD AUTO: 2 % (ref 0–6)
ERYTHROCYTE [DISTWIDTH] IN BLOOD BY AUTOMATED COUNT: 12.2 % (ref 11.6–15.1)
ERYTHROCYTE [SEDIMENTATION RATE] IN BLOOD: <1 MM/HOUR (ref 0–29)
GFR SERPL CREATININE-BSD FRML MDRD: 77 ML/MIN/1.73SQ M
GLUCOSE SERPL-MCNC: 103 MG/DL (ref 65–140)
HCT VFR BLD AUTO: 38.6 % (ref 34.8–46.1)
HGB BLD-MCNC: 12.8 G/DL (ref 11.5–15.4)
IMM GRANULOCYTES # BLD AUTO: 0.02 THOUSAND/UL (ref 0–0.2)
IMM GRANULOCYTES NFR BLD AUTO: 1 % (ref 0–2)
INR PPP: 1.24 (ref 0.84–1.19)
LACTATE SERPL-SCNC: 1.4 MMOL/L (ref 0.5–2)
LYMPHOCYTES # BLD AUTO: 0.96 THOUSANDS/ÂΜL (ref 0.6–4.47)
LYMPHOCYTES NFR BLD AUTO: 24 % (ref 14–44)
MCH RBC QN AUTO: 32.3 PG (ref 26.8–34.3)
MCHC RBC AUTO-ENTMCNC: 33.2 G/DL (ref 31.4–37.4)
MCV RBC AUTO: 98 FL (ref 82–98)
MONOCYTES # BLD AUTO: 0.35 THOUSAND/ÂΜL (ref 0.17–1.22)
MONOCYTES NFR BLD AUTO: 9 % (ref 4–12)
NEUTROPHILS # BLD AUTO: 2.61 THOUSANDS/ÂΜL (ref 1.85–7.62)
NEUTS SEG NFR BLD AUTO: 63 % (ref 43–75)
NRBC BLD AUTO-RTO: 0 /100 WBCS
PLATELET # BLD AUTO: 118 THOUSANDS/UL (ref 149–390)
PMV BLD AUTO: 10.6 FL (ref 8.9–12.7)
POTASSIUM SERPL-SCNC: 4.3 MMOL/L (ref 3.5–5.3)
PROCALCITONIN SERPL-MCNC: <0.05 NG/ML
PROT SERPL-MCNC: 5.8 G/DL (ref 6.4–8.4)
PROTHROMBIN TIME: 16.1 SECONDS (ref 11.6–14.5)
RBC # BLD AUTO: 3.96 MILLION/UL (ref 3.81–5.12)
SODIUM SERPL-SCNC: 137 MMOL/L (ref 135–147)
WBC # BLD AUTO: 4.04 THOUSAND/UL (ref 4.31–10.16)

## 2024-07-15 PROCEDURE — 86140 C-REACTIVE PROTEIN: CPT | Performed by: EMERGENCY MEDICINE

## 2024-07-15 PROCEDURE — 85025 COMPLETE CBC W/AUTO DIFF WBC: CPT | Performed by: EMERGENCY MEDICINE

## 2024-07-15 PROCEDURE — 73630 X-RAY EXAM OF FOOT: CPT

## 2024-07-15 PROCEDURE — 85610 PROTHROMBIN TIME: CPT | Performed by: EMERGENCY MEDICINE

## 2024-07-15 PROCEDURE — 85730 THROMBOPLASTIN TIME PARTIAL: CPT | Performed by: EMERGENCY MEDICINE

## 2024-07-15 PROCEDURE — 85652 RBC SED RATE AUTOMATED: CPT | Performed by: EMERGENCY MEDICINE

## 2024-07-15 PROCEDURE — 36415 COLL VENOUS BLD VENIPUNCTURE: CPT | Performed by: EMERGENCY MEDICINE

## 2024-07-15 PROCEDURE — 99284 EMERGENCY DEPT VISIT MOD MDM: CPT | Performed by: EMERGENCY MEDICINE

## 2024-07-15 PROCEDURE — 83605 ASSAY OF LACTIC ACID: CPT | Performed by: EMERGENCY MEDICINE

## 2024-07-15 PROCEDURE — 80053 COMPREHEN METABOLIC PANEL: CPT | Performed by: EMERGENCY MEDICINE

## 2024-07-15 PROCEDURE — 87040 BLOOD CULTURE FOR BACTERIA: CPT | Performed by: EMERGENCY MEDICINE

## 2024-07-15 PROCEDURE — 84145 PROCALCITONIN (PCT): CPT | Performed by: EMERGENCY MEDICINE

## 2024-07-15 PROCEDURE — 99283 EMERGENCY DEPT VISIT LOW MDM: CPT

## 2024-07-15 RX ORDER — CEPHALEXIN 250 MG/1
500 CAPSULE ORAL ONCE
Status: COMPLETED | OUTPATIENT
Start: 2024-07-15 | End: 2024-07-15

## 2024-07-15 RX ORDER — CEPHALEXIN 500 MG/1
500 CAPSULE ORAL EVERY 6 HOURS SCHEDULED
Qty: 28 CAPSULE | Refills: 0 | Status: SHIPPED | OUTPATIENT
Start: 2024-07-15 | End: 2024-07-22

## 2024-07-15 RX ADMIN — CEPHALEXIN 500 MG: 250 CAPSULE ORAL at 18:08

## 2024-07-15 NOTE — ED NOTES
Patient ambulated in hallway with CAM boot and walker to bathroom without difficulty.     Afshan Rhodes RN  07/15/24 9622

## 2024-07-16 NOTE — ED PROVIDER NOTES
History  Chief Complaint   Patient presents with    Toe Swelling     Pt reports partial ambutation of right great toe. Pt reports redness to the toe next to it.     68-year-old female presents for evaluation of redness to second toe that started yesterday.  Patient underwent partial amputation of great toe on right foot a few weeks ago and reports she did not have any issues.  Red second toe is also painful with palpation.  Denies any further systemic signs of illness including fever, chills, vomiting.        Prior to Admission Medications   Prescriptions Last Dose Informant Patient Reported? Taking?   ALPRAZolam (XANAX) 0.5 mg tablet   No No   Sig: take 1 tablet by mouth twice a day for anxiety.   Patient taking differently: Take 0.5 mg by mouth 2 (two) times a day take 1 tablet by mouth twice a day for anxiety. Additional PRN dose occasionally   Ipratropium-Albuterol (COMBIVENT IN)  Self Yes No   Sig: Inhale 2 (two) times a day   Patient not taking: Reported on 6/21/2024   Melatonin 10 MG TABS   Yes No   Sig: Take by mouth   carbamide peroxide (DEBROX) 6.5 % otic solution   No No   Sig: Administer 5 drops into both ears 2 (two) times a day   fluticasone-vilanterol (BREO ELLIPTA) 100-25 mcg/inh inhaler  Self Yes No   Sig: Inhale 1 puff daily Rinse mouth after use.   folic acid (FOLVITE) 1 mg tablet  Self Yes No   Sig: Take by mouth daily   furosemide (LASIX) 20 mg tablet   No No   Sig: Take 20 mg daily alternating with 40 mg daily.   gabapentin (NEURONTIN) 300 mg capsule   No No   Sig: Take 1 capsule (300 mg total) by mouth 2 (two) times a day   ipratropium (ATROVENT HFA) 17 mcg/act inhaler   No No   Sig: Inhale 2 puffs 4 (four) times a day as needed for wheezing   lactulose (CHRONULAC) 10 g/15 mL solution   No No   Sig: Take 30 mL (20 g total) by mouth 3 (three) times a day   magnesium hydroxide (MILK OF MAGNESIA) 400 mg/5 mL oral suspension   Yes No   Sig: Take by mouth daily as needed for constipation    meclizine (ANTIVERT) 12.5 MG tablet   No No   Sig: Take 1 tablet (12.5 mg total) by mouth every 8 (eight) hours as needed for dizziness   metoprolol succinate (TOPROL-XL) 25 mg 24 hr tablet  Self Yes No   Sig: Take 12.5 mg by mouth daily   nicotine (NICODERM CQ) 14 mg/24hr TD 24 hr patch   No No   Sig: Place 1 patch on the skin over 24 hours daily   Patient not taking: Reported on 6/21/2024   pantoprazole (PROTONIX) 40 mg tablet   No No   Sig: Take 1 tablet (40 mg total) by mouth daily   spironolactone (ALDACTONE) 50 mg tablet  Self Yes No   Sig: Take 50 mg by mouth 2 (two) times a day   thiamine (VITAMIN B1) 100 mg tablet   Yes No   Sig: Take 100 mg by mouth daily      Facility-Administered Medications: None       Past Medical History:   Diagnosis Date    Anemia     Anxiety     Ascites     Cervical cancer (HCC)     Chronic pain     Cirrhosis (HCC)     CVA (cerebral vascular accident) (HCC)     Depression     Fibromyalgia     Hepatic encephalopathy (HCC)     Opioid dependence in remission (HCC) 11/7/2022       Past Surgical History:   Procedure Laterality Date    COLONOSCOPY  07/14/2015    COLONOSCOPY  03/15/2013    Hemorrhoids    EGD  12/23/2014    HYSTERECTOMY      HI AMPUTATION TOE INTERPHALANGEAL JOINT Right 6/7/2024    Procedure: AMPUTATION RIGHT GREAT TOE;  Surgeon: Niru Baldwin DPM;  Location: [facility];  Service: Podiatry       Family History   Problem Relation Age of Onset    Lung cancer Mother     Cirrhosis Father     Cancer Sister     Colon polyps Neg Hx     Colon cancer Neg Hx      I have reviewed and agree with the history as documented.    E-Cigarette/Vaping    E-Cigarette Use Never User      E-Cigarette/Vaping Substances    Nicotine No     THC No     CBD No     Flavoring No     Other No     Unknown No      Social History     Tobacco Use    Smoking status: Every Day     Passive exposure: Current    Smokeless tobacco: Never   Vaping Use    Vaping status: Never Used   Substance Use Topics     Alcohol use: Not Currently     Comment: Had been drinking anywhere from 3-5 drinks of hard alcohol daily for the past 2 years, quit 8/17       Review of Systems   Skin:  Positive for color change.       Physical Exam  Physical Exam  Vitals and nursing note reviewed.   Constitutional:       Appearance: She is well-developed.   HENT:      Head: Normocephalic and atraumatic.      Right Ear: External ear normal.      Left Ear: External ear normal.      Nose: Nose normal.   Eyes:      General: No scleral icterus.  Cardiovascular:      Rate and Rhythm: Normal rate.   Pulmonary:      Effort: Pulmonary effort is normal. No respiratory distress.   Abdominal:      General: There is no distension.   Musculoskeletal:         General: No deformity. Normal range of motion.      Cervical back: Normal range of motion.      Comments: Second right toe erythematous.  Normal range of motion.  Tender to palpation.   Skin:     Findings: No rash.   Neurological:      General: No focal deficit present.      Mental Status: She is alert and oriented to person, place, and time.   Psychiatric:         Mood and Affect: Mood normal.         Vital Signs  ED Triage Vitals [07/15/24 1516]   Temperature Pulse Respirations Blood Pressure SpO2   99.2 °F (37.3 °C) (!) 107 20 134/75 90 %      Temp Source Heart Rate Source Patient Position - Orthostatic VS BP Location FiO2 (%)   Temporal Monitor Sitting Left arm --      Pain Score       10 - Worst Possible Pain           Vitals:    07/15/24 1516 07/15/24 1801   BP: 134/75 132/76   Pulse: (!) 107 84   Patient Position - Orthostatic VS: Sitting Lying         Visual Acuity      ED Medications  Medications   cephalexin (KEFLEX) capsule 500 mg (500 mg Oral Given 7/15/24 1808)       Diagnostic Studies  Results Reviewed       Procedure Component Value Units Date/Time    Blood culture #1 [[id number]] Collected: 07/15/24 1604    Lab Status: Preliminary result Specimen: Blood from Hand, Right Updated: 07/15/24 2201      Blood Culture Received in Microbiology Lab. Culture in Progress.    Blood culture #2 [[id number]] Collected: 07/15/24 1604    Lab Status: Preliminary result Specimen: Blood from Arm, Right Updated: 07/15/24 2201     Blood Culture Received in Microbiology Lab. Culture in Progress.    Procalcitonin [[id number]]  (Normal) Collected: 07/15/24 1604    Lab Status: Final result Specimen: Blood from Arm, Right Updated: 07/15/24 1638     Procalcitonin <0.05 ng/ml     Protime-INR [[id number]]  (Abnormal) Collected: 07/15/24 1604    Lab Status: Final result Specimen: Blood from Arm, Right Updated: 07/15/24 1634     Protime 16.1 seconds      INR 1.24    APTT [[id number]]  (Normal) Collected: 07/15/24 1604    Lab Status: Final result Specimen: Blood from Arm, Right Updated: 07/15/24 1634     PTT 32 seconds     Sedimentation rate, automated [[id number]]  (Normal) Collected: 07/15/24 1604    Lab Status: Final result Specimen: Blood from Arm, Right Updated: 07/15/24 1633     Sed Rate <1 mm/hour     Comprehensive metabolic panel [[id number]]  (Abnormal) Collected: 07/15/24 1604    Lab Status: Final result Specimen: Blood from Arm, Right Updated: 07/15/24 1630     Sodium 137 mmol/L      Potassium 4.3 mmol/L      Chloride 99 mmol/L      CO2 34 mmol/L      ANION GAP 4 mmol/L      BUN 10 mg/dL      Creatinine 0.78 mg/dL      Glucose 103 mg/dL      Calcium 9.9 mg/dL      Corrected Calcium 10.5 mg/dL      AST 47 U/L      ALT 32 U/L      Alkaline Phosphatase 126 U/L      Total Protein 5.8 g/dL      Albumin 3.2 g/dL      Total Bilirubin 1.96 mg/dL      eGFR 77 ml/min/1.73sq m     Narrative:      National Kidney Disease Foundation guidelines for Chronic Kidney Disease (CKD):     Stage 1 with normal or high GFR (GFR > 90 mL/min/1.73 square meters)    Stage 2 Mild CKD (GFR = 60-89 mL/min/1.73 square meters)    Stage 3A Moderate CKD (GFR = 45-59 mL/min/1.73 square meters)    Stage 3B Moderate CKD (GFR = 30-44 mL/min/1.73 square meters)    Stage 4  Severe CKD (GFR = 15-29 mL/min/1.73 square meters)    Stage 5 End Stage CKD (GFR <15 mL/min/1.73 square meters)  Note: GFR calculation is accurate only with a steady state creatinine    C-reactive protein [[id number]]  (Normal) Collected: 07/15/24 1604    Lab Status: Final result Specimen: Blood from Arm, Right Updated: 07/15/24 1630     CRP 2.4 mg/L     Lactic acid [[id number]]  (Normal) Collected: 07/15/24 1604    Lab Status: Final result Specimen: Blood from Arm, Right Updated: 07/15/24 1629     LACTIC ACID 1.4 mmol/L     Narrative:      Result may be elevated if tourniquet was used during collection.    CBC and differential [[id number]]  (Abnormal) Collected: 07/15/24 1604    Lab Status: Final result Specimen: Blood from Arm, Right Updated: 07/15/24 1619     WBC 4.04 Thousand/uL      RBC 3.96 Million/uL      Hemoglobin 12.8 g/dL      Hematocrit 38.6 %      MCV 98 fL      MCH 32.3 pg      MCHC 33.2 g/dL      RDW 12.2 %      MPV 10.6 fL      Platelets 118 Thousands/uL      nRBC 0 /100 WBCs      Segmented % 63 %      Immature Grans % 1 %      Lymphocytes % 24 %      Monocytes % 9 %      Eosinophils Relative 2 %      Basophils Relative 1 %      Absolute Neutrophils 2.61 Thousands/µL      Absolute Immature Grans 0.02 Thousand/uL      Absolute Lymphocytes 0.96 Thousands/µL      Absolute Monocytes 0.35 Thousand/µL      Eosinophils Absolute 0.07 Thousand/µL      Basophils Absolute 0.03 Thousands/µL                    XR foot 3+ views RIGHT    (Results Pending)              Procedures  Procedures         ED Course                                 SBIRT 22yo+      Flowsheet Row Most Recent Value   Initial Alcohol Screen: US AUDIT-C     1. How often do you have a drink containing alcohol? 0 Filed at: 07/15/2024 1540   2. How many drinks containing alcohol do you have on a typical day you are drinking?  0 Filed at: 07/15/2024 1541   3b. FEMALE Any Age, or MALE 65+: How often do you have 4 or more drinks on one occassion? 0  Filed at: 07/15/2024 1546   Audit-C Score 0 Filed at: 07/15/2024 1546   NANCY: How many times in the past year have you...    Used an illegal drug or used a prescription medication for non-medical reasons? Never Filed at: 07/15/2024 1546                      Medical Decision Making  68-year-old female presenting with redness and pain of right second toe suspicious for cellulitis versus osteomyelitis.  Obtain sepsis evaluation with inflammatory markers.  X-ray.    Inflammatory markers and sepsis evaluation reassuring without acute signs of osseous abnormality on x-ray.  Administer antibiotics.  Advise close follow-up with podiatrist.  Also reviewed x-ray from last week which showed a questionable fracture of left malleolus.  Will apply cam boot.  Also advise close follow-up with podiatrist.    Amount and/or Complexity of Data Reviewed  Labs: ordered.  Radiology: ordered.    Risk  Prescription drug management.                 Disposition  Final diagnoses:   Cellulitis of toe     Time reflects when diagnosis was documented in both MDM as applicable and the Disposition within this note       Time User Action Codes Description Comment    7/15/2024  6:00 PM Leobardo Urena Add [L03.039] Cellulitis of toe           ED Disposition       ED Disposition   Discharge    Condition   Stable    Date/Time   Mon Jul 15, 2024 1800    Comment   Finn Oleary discharge to home/self care.                   Follow-up Information       Follow up With Specialties Details Why Contact Info Additional Information    Scot Brink MD Internal Medicine   [address] [place] [address].  [address]  [place] [address]  [phone]       [facility] [place] [facility]   [address]  [address]  [facility] [address]  [phone] [facility] [place], [address], [address], [place], [address]   [phone]     [facility] Emergency Department Emergency Medicine  If symptoms worsen [address]  [facility] [address] [phone] [facility] Emergency Department, [address] [facility], [place], [place] [address]            Discharge Medication List as of 7/15/2024  6:01 PM        START taking these medications    Details   cephalexin (KEFLEX) 500 mg capsule Take 1 capsule (500 mg total) by mouth every 6 (six) hours for 7 days, Starting Mon 7/15/2024, Until Mon 7/22/2024, Normal           CONTINUE these medications which have NOT CHANGED    Details   ALPRAZolam (XANAX) 0.5 mg tablet take 1 tablet by mouth twice a day for anxiety., Normal      carbamide peroxide (DEBROX) 6.5 % otic solution Administer 5 drops into both ears 2 (two) times a day, Starting Mon 6/10/2024, Normal      fluticasone-vilanterol (BREO ELLIPTA) 100-25 mcg/inh inhaler Inhale 1 puff daily Rinse mouth after use., Historical Med      folic acid (FOLVITE) 1 mg tablet Take by mouth daily, Historical Med      furosemide (LASIX) 20 mg tablet Take 20 mg daily alternating with 40 mg daily., Normal      gabapentin (NEURONTIN) 300 mg capsule Take 1 capsule (300 mg total) by mouth 2 (two) times a day, Starting Mon 6/10/2024, Print      ipratropium (ATROVENT HFA) 17 mcg/act inhaler Inhale 2 puffs 4 (four) times a day as needed for wheezing, Starting Mon 6/10/2024, Normal      Ipratropium-Albuterol (COMBIVENT IN) Inhale 2 (two) times a day, Historical Med      lactulose (CHRONULAC) 10 g/15 mL solution Take 30 mL (20 g total) by mouth 3 (three) times a day, Starting Mon 6/10/2024, Normal      magnesium hydroxide (MILK OF MAGNESIA) 400 mg/5 mL oral suspension Take by mouth daily as needed for constipation, Historical Med      meclizine (ANTIVERT) 12.5 MG tablet Take 1 tablet (12.5 mg total) by mouth every 8 (eight) hours as needed for dizziness, Starting Mon 6/10/2024, Normal      Melatonin 10 MG TABS Take by mouth, Historical Med      metoprolol succinate (TOPROL-XL) 25 mg 24 hr tablet Take 12.5 mg by  mouth daily, Historical Med      nicotine (NICODERM CQ) 14 mg/24hr TD 24 hr patch Place 1 patch on the skin over 24 hours daily, Starting Tue 6/11/2024, Normal      pantoprazole (PROTONIX) 40 mg tablet Take 1 tablet (40 mg total) by mouth daily, Starting Fri 9/13/2019, Normal      spironolactone (ALDACTONE) 50 mg tablet Take 50 mg by mouth 2 (two) times a day, Historical Med      thiamine (VITAMIN B1) 100 mg tablet Take 100 mg by mouth daily, Historical Med             No discharge procedures on file.    PDMP Review         Value Time User    PDMP Reviewed  Yes 9/8/2023  3:47 PM Jomar Ramirez MD            ED Provider  Electronically Signed by             Leobardo Urena DO  07/16/24 0140

## 2024-07-20 LAB
BACTERIA BLD CULT: NORMAL
BACTERIA BLD CULT: NORMAL

## 2024-07-26 ENCOUNTER — TELEPHONE (OUTPATIENT)
Dept: PODIATRY | Facility: CLINIC | Age: 69
End: 2024-07-26

## 2024-07-26 NOTE — TELEPHONE ENCOUNTER
LVM for patient to call back and reschedule missed post op appt. Patient can reschedule next week with Dr Hernandez or Dr Segovia per Dr Baldwin, or patient can be seen in 2 weeks with her.

## 2024-07-30 ENCOUNTER — OFFICE VISIT (OUTPATIENT)
Dept: PSYCHIATRY | Facility: CLINIC | Age: 69
End: 2024-07-30
Payer: MEDICARE

## 2024-07-30 DIAGNOSIS — F41.1 GENERALIZED ANXIETY DISORDER: ICD-10-CM

## 2024-07-30 PROCEDURE — 99214 OFFICE O/P EST MOD 30 MIN: CPT | Performed by: STUDENT IN AN ORGANIZED HEALTH CARE EDUCATION/TRAINING PROGRAM

## 2024-07-30 RX ORDER — ALPRAZOLAM 0.5 MG/1
TABLET ORAL
Qty: 66 TABLET | Refills: 5 | Status: SHIPPED | OUTPATIENT
Start: 2024-07-30

## 2024-07-30 NOTE — PSYCH
MEDICATION MANAGEMENT NOTE        [facility]      Name and Date of Birth:  Finn Oleary 68 y.o. 1955 MRN: [id number]    Date of Visit: July 30, 2024    Reason for Visit: Follow-up visit for medication management       SUBJECTIVE:    Finn Oleary is a 68 y.o. female with past psychiatric history significant for MDD who was personally seen and evaluated today at the [facility] outpatient clinic for follow-up and medication management. Finn states that her depression and anxiety are about the same as our previous appointment.  She denies SI, HI, AVH, delusions, moustapha since we last met.  She does endorse some decreased mood and anxiety due to her recent medical conditions and injuries however states that she feels as if she is resilient in coping with this.  After discussion of risks, benefits, potential side effects, alternatives, we will continue current regimen as is without any changes.  She denies acute mental complaints or concerns at this time        Current Rating Scores:     None completed today.    Review Of Systems:      Constitutional negative   ENT Denies   Cardiovascular negative   Respiratory negative   Gastrointestinal negative   Genitourinary negative   Musculoskeletal knee pain and toe pain   Integumentary negative   Neurological neuropathic pain   Endocrine negative   Other Symptoms none, all other systems are negative       Past Psychiatric History: (unchanged information from previous note copied and italicized) - Information that is bolded has been updated.   See intake      Substance Abuse History: (unchanged information from previous note copied and italicized) - Information that is bolded has been updated.     See intake    Social History: (unchanged information from previous note copied and italicized) - Information that is bolded has been updated.   See intake      Traumatic History: (unchanged information from  previous note copied and italicized) - Information that is bolded has been updated.     See intake      Past Medical History:    Past Medical History:   Diagnosis Date    Anemia     Anxiety     Ascites     Cervical cancer (HCC)     Chronic pain     Cirrhosis (HCC)     CVA (cerebral vascular accident) (HCC)     Depression     Fibromyalgia     Hepatic encephalopathy (HCC)     Opioid dependence in remission (HCC) 11/7/2022        Past Surgical History:   Procedure Laterality Date    COLONOSCOPY  07/14/2015    COLONOSCOPY  03/15/2013    Hemorrhoids    EGD  12/23/2014    HYSTERECTOMY      FL AMPUTATION TOE INTERPHALANGEAL JOINT Right 6/7/2024    Procedure: AMPUTATION RIGHT GREAT TOE;  Surgeon: Niru Baldwin DPM;  Location:  MAIN OR;  Service: Podiatry     Allergies   Allergen Reactions    Gadolinium     Ibuprofen Other (See Comments)     increased BP    Nsaids     Other Palpitations     All anti depressants       Substance Abuse History:    Social History     Substance and Sexual Activity   Alcohol Use Not Currently    Comment: Had been drinking anywhere from 3-5 drinks of hard alcohol daily for the past 2 years, quit 8/17     Social History     Substance and Sexual Activity   Drug Use Not on file       Social History:    Social History     Socioeconomic History    Marital status:      Spouse name: Not on file    Number of children: Not on file    Years of education: Not on file    Highest education level: Not on file   Occupational History    Not on file   Tobacco Use    Smoking status: Every Day     Passive exposure: Current    Smokeless tobacco: Never   Vaping Use    Vaping status: Never Used   Substance and Sexual Activity    Alcohol use: Not Currently     Comment: Had been drinking anywhere from 3-5 drinks of hard alcohol daily for the past 2 years, quit 8/17    Drug use: Not on file    Sexual activity: Not on file   Other Topics Concern    Not on file   Social History Narrative    Not on file     Social  Determinants of Health     Financial Resource Strain: Not on file   Food Insecurity: No Food Insecurity (6/10/2024)    Hunger Vital Sign     Worried About Running Out of Food in the Last Year: Never true     Ran Out of Food in the Last Year: Never true   Transportation Needs: No Transportation Needs (6/10/2024)    PRAPARE - Transportation     Lack of Transportation (Medical): No     Lack of Transportation (Non-Medical): No   Physical Activity: Not on file   Stress: Not on file   Social Connections: Not on file   Intimate Partner Violence: Not on file   Housing Stability: Low Risk  (6/10/2024)    Housing Stability Vital Sign     Unable to Pay for Housing in the Last Year: No     Number of Times Moved in the Last Year: 1     Homeless in the Last Year: No       Family Psychiatric History:     Family History   Problem Relation Age of Onset    Lung cancer Mother     Cirrhosis Father     Cancer Sister     Colon polyps Neg Hx     Colon cancer Neg Hx        History Review: The following portions of the patient's history were reviewed and updated as appropriate: allergies, current medications, past family history, past medical history, past social history, past surgical history, and problem list.         OBJECTIVE:     Vital signs in last 24 hours:    There were no vitals filed for this visit.    Mental Status Evaluation:    Appearance age appropriate, casually dressed   Behavior cooperative, mildly anxious   Speech normal rate, normal volume, normal pitch   Mood dysphoric   Affect constricted   Thought Processes organized, goal directed   Associations intact associations   Thought Content no overt delusions   Perceptual Disturbances: no auditory hallucinations, no visual hallucinations   Abnormal Thoughts  Risk Potential Suicidal ideation - None  Homicidal ideation - None  Potential for aggression - No   Orientation oriented to person, place, time/date, and situation   Memory recent and remote memory grossly intact    Consciousness alert and awake   Attention Span Concentration Span attention span and concentration are age appropriate   Intellect appears to be of average intelligence   Insight intact   Judgement intact   Muscle Strength and  Gait decreased muscle strength, slow gait   Motor activity no abnormal movements   Language no difficulty naming common objects, no difficulty repeating a phrase   Fund of Knowledge adequate knowledge of current events  adequate fund of knowledge regarding past history   Pain mild   Pain Scale Did not ask patient to formally rate       Laboratory Results: I have personally reviewed all pertinent laboratory/tests results    Recent Labs (last 2 months):   Admission on 07/15/2024, Discharged on 07/15/2024   Component Date Value    WBC 07/15/2024 4.04 (L)     RBC 07/15/2024 3.96     Hemoglobin 07/15/2024 12.8     Hematocrit 07/15/2024 38.6     MCV 07/15/2024 98     MCH 07/15/2024 32.3     MCHC 07/15/2024 33.2     RDW 07/15/2024 12.2     MPV 07/15/2024 10.6     Platelets 07/15/2024 118 (L)     nRBC 07/15/2024 0     Segmented % 07/15/2024 63     Immature Grans % 07/15/2024 1     Lymphocytes % 07/15/2024 24     Monocytes % 07/15/2024 9     Eosinophils Relative 07/15/2024 2     Basophils Relative 07/15/2024 1     Absolute Neutrophils 07/15/2024 2.61     Absolute Immature Grans 07/15/2024 0.02     Absolute Lymphocytes 07/15/2024 0.96     Absolute Monocytes 07/15/2024 0.35     Eosinophils Absolute 07/15/2024 0.07     Basophils Absolute 07/15/2024 0.03     Sodium 07/15/2024 137     Potassium 07/15/2024 4.3     Chloride 07/15/2024 99     CO2 07/15/2024 34 (H)     ANION GAP 07/15/2024 4     BUN 07/15/2024 10     Creatinine 07/15/2024 0.78     Glucose 07/15/2024 103     Calcium 07/15/2024 9.9     Corrected Calcium 07/15/2024 10.5 (H)     AST 07/15/2024 47 (H)     ALT 07/15/2024 32     Alkaline Phosphatase 07/15/2024 126 (H)     Total Protein 07/15/2024 5.8 (L)     Albumin 07/15/2024 3.2 (L)     Total  Bilirubin 07/15/2024 1.96 (H)     eGFR 07/15/2024 77     LACTIC ACID 07/15/2024 1.4     Procalcitonin 07/15/2024 <0.05     Protime 07/15/2024 16.1 (H)     INR 07/15/2024 1.24 (H)     PTT 07/15/2024 32     Blood Culture 07/15/2024 No Growth After 5 Days.     Blood Culture 07/15/2024 No Growth After 5 Days.     Sed Rate 07/15/2024 <1     CRP 07/15/2024 2.4    Admission on 07/08/2024, Discharged on 07/08/2024   Component Date Value    WBC 07/08/2024 4.67     RBC 07/08/2024 3.85     Hemoglobin 07/08/2024 12.5     Hematocrit 07/08/2024 36.8     MCV 07/08/2024 96     MCH 07/08/2024 32.5     MCHC 07/08/2024 34.0     RDW 07/08/2024 11.7     Platelets 07/08/2024 113 (L)     nRBC 07/08/2024 0     Segmented % 07/08/2024 46     Immature Grans % 07/08/2024 0     Lymphocytes % 07/08/2024 43     Monocytes % 07/08/2024 8     Eosinophils Relative 07/08/2024 2     Basophils Relative 07/08/2024 1     Absolute Neutrophils 07/08/2024 2.15     Absolute Immature Grans 07/08/2024 0.01     Absolute Lymphocytes 07/08/2024 1.99     Absolute Monocytes 07/08/2024 0.39     Eosinophils Absolute 07/08/2024 0.10     Basophils Absolute 07/08/2024 0.03     Sodium 07/08/2024 136     Potassium 07/08/2024 3.7     Chloride 07/08/2024 102     CO2 07/08/2024 30     ANION GAP 07/08/2024 4     BUN 07/08/2024 11     Creatinine 07/08/2024 0.68     Glucose 07/08/2024 88     Calcium 07/08/2024 10.5 (H)     Corrected Calcium 07/08/2024 11.1 (H)     AST 07/08/2024 57 (H)     ALT 07/08/2024 41     Alkaline Phosphatase 07/08/2024 106 (H)     Total Protein 07/08/2024 5.9 (L)     Albumin 07/08/2024 3.3 (L)     Total Bilirubin 07/08/2024 1.33 (H)     eGFR 07/08/2024 89     Ammonia 07/08/2024 39     Total CK 07/08/2024 55     Ethanol Lvl 07/08/2024 145 (H)     Amph/Meth UR 07/08/2024 Negative     Barbiturate Ur 07/08/2024 Negative     Benzodiazepine Urine 07/08/2024 Positive (A)     Cocaine Urine 07/08/2024 Negative     Methadone Urine 07/08/2024 Negative     Opiate  Urine 07/08/2024 Positive (A)     PCP Ur 07/08/2024 Negative     THC Urine 07/08/2024 Negative     Oxycodone Urine 07/08/2024 Positive (A)     Fentanyl Urine 07/08/2024 Negative     HYDROCODONE URINE 07/08/2024 Negative     Lipase 07/08/2024 36     Ventricular Rate 07/08/2024 83     Atrial Rate 07/08/2024 83     NH Interval 07/08/2024 128     QRSD Interval 07/08/2024 82     QT Interval 07/08/2024 376     QTC Interval 07/08/2024 441     P Axis 07/08/2024 77     QRS Axis 07/08/2024 72     T Wave Axis 07/08/2024 71    No results displayed because visit has over 200 results.          Suicide/Homicide Risk Assessment:    Risk of Harm to Self:  The following ratings are based on assessment at the time of the interview  Historical Risk Factors include: chronic psychiatric problems  Protective Factors: no current suicidal ideation, compliant with medications, compliant with mental health treatment, having a desire to be alive, stable living environment    Risk of Harm to Others:  The following ratings are based on assessment at the time of the interview  Historical Risk Factors include: none.  Protective Factors: no current homicidal ideation    The following interventions are recommended: contracts for safety at present - agrees to go to ED if feeling unsafe, contracts for safety at present - agrees to call Crisis Intervention Service if feeling unsafe      Lethality Statement:    Based on today's assessment and clinical criteria, Finn Oleary contracts for safety and is not an imminent risk of harm to self or others. Outpatient level of care is deemed appropriate at this current time. Finn understands that if they can no longer contract for safety, they need to call the office or report to their nearest Emergency Room for immediate evaluation. They voiced understanding and agreement to call 911 or head to the nearest ED should they have any physical or mental decompensation whatsoever.       Assessment/Plan:     1.)   MDD  2.)  TOO  3.)    After discussion of risks, benefits, and side effects, alternatives, patient declines to change her medication regimen in any way and will continue to be given 66 tablets/month of Xanax.  Intent is to allow patient twice daily doses with an additional 6 doses in case of particularly difficult days.            Medications Risks/Benefits      Risks, Benefits And Possible Side Effects Of Medications:    Risks, benefits, and possible side effects of medications explained to Finn and she verbalizes understanding and agreement for treatment.    Controlled Medication Discussion:     Finn has been filling controlled prescriptions on time as prescribed according to [place] Prescription Drug Monitoring Program    Psychotherapy Provided:     Individual psychotherapy provided: Importance of medication and treatment compliance reviewed with Finn.  Importance of follow up with family physician for medical issues reviewed with Finn.  Crisis/safety plan discussed with Finn.     Treatment Plan:    Completed and signed during the session:  We will perform in next appointment due to lack of time today      Visit Time    Visit Start Time: 1:00 PM  Visit Stop Time: 1:20 PM  Total Visit Duration:  20 minutes     The total visit duration detailed above includes: patient engagement, medication management, psychotherapy/counseling, discussion regarding treatment goals, documentation, review of past medical records, and coordination of care.      Note Share Disclaimer:     This note was not shared with the patient due to reasonable likelihood of causing patient harm      Ana Paula Cadena DO  Psychiatry  07/30/24

## 2024-08-05 ENCOUNTER — HOSPITAL ENCOUNTER (OUTPATIENT)
Dept: RADIOLOGY | Facility: HOSPITAL | Age: 69
Discharge: HOME | End: 2024-08-05
Payer: MEDICARE

## 2024-08-05 DIAGNOSIS — L89.892 DECUBITUS ULCER OF LEG, STAGE 2 (HCC): ICD-10-CM

## 2024-08-05 PROCEDURE — 73630 X-RAY EXAM OF FOOT: CPT

## 2024-08-06 ENCOUNTER — TELEPHONE (OUTPATIENT)
Dept: PSYCHIATRY | Facility: CLINIC | Age: 69
End: 2024-08-06

## 2024-08-21 ENCOUNTER — APPOINTMENT (EMERGENCY)
Dept: RADIOLOGY | Facility: HOSPITAL | Age: 69
End: 2024-08-21
Payer: MEDICARE

## 2024-08-21 ENCOUNTER — HOSPITAL ENCOUNTER (EMERGENCY)
Facility: HOSPITAL | Age: 69
Discharge: HOME | End: 2024-08-21
Attending: EMERGENCY MEDICINE
Payer: MEDICARE

## 2024-08-21 VITALS
DIASTOLIC BLOOD PRESSURE: 89 MMHG | TEMPERATURE: 97.8 F | HEART RATE: 64 BPM | RESPIRATION RATE: 18 BRPM | BODY MASS INDEX: 21.62 KG/M2 | WEIGHT: 122 LBS | HEIGHT: 63 IN | SYSTOLIC BLOOD PRESSURE: 142 MMHG | OXYGEN SATURATION: 95 %

## 2024-08-21 DIAGNOSIS — M86.60 CHRONIC OSTEOMYELITIS (HCC): Primary | ICD-10-CM

## 2024-08-21 LAB
ALBUMIN SERPL BCG-MCNC: 3.2 G/DL (ref 3.5–5)
ALP SERPL-CCNC: 103 U/L (ref 34–104)
ALT SERPL W P-5'-P-CCNC: 11 U/L (ref 7–52)
ANION GAP SERPL CALCULATED.3IONS-SCNC: 4 MMOL/L (ref 4–13)
AST SERPL W P-5'-P-CCNC: 24 U/L (ref 13–39)
BASOPHILS # BLD AUTO: 0.03 THOUSANDS/ÂΜL (ref 0–0.1)
BASOPHILS NFR BLD AUTO: 1 % (ref 0–1)
BILIRUB SERPL-MCNC: 1.39 MG/DL (ref 0.2–1)
BUN SERPL-MCNC: 12 MG/DL (ref 5–25)
CALCIUM ALBUM COR SERPL-MCNC: 10.5 MG/DL (ref 8.3–10.1)
CALCIUM SERPL-MCNC: 9.9 MG/DL (ref 8.4–10.2)
CHLORIDE SERPL-SCNC: 103 MMOL/L (ref 96–108)
CO2 SERPL-SCNC: 34 MMOL/L (ref 21–32)
CREAT SERPL-MCNC: 0.8 MG/DL (ref 0.6–1.3)
CRP SERPL QL: 10.7 MG/L
EOSINOPHIL # BLD AUTO: 0.09 THOUSAND/ÂΜL (ref 0–0.61)
EOSINOPHIL NFR BLD AUTO: 3 % (ref 0–6)
ERYTHROCYTE [DISTWIDTH] IN BLOOD BY AUTOMATED COUNT: 13.3 % (ref 11.6–15.1)
ERYTHROCYTE [SEDIMENTATION RATE] IN BLOOD: 2 MM/HOUR (ref 0–29)
GFR SERPL CREATININE-BSD FRML MDRD: 75 ML/MIN/1.73SQ M
GLUCOSE SERPL-MCNC: 95 MG/DL (ref 65–140)
HCT VFR BLD AUTO: 38.8 % (ref 34.8–46.1)
HGB BLD-MCNC: 13.2 G/DL (ref 11.5–15.4)
IMM GRANULOCYTES # BLD AUTO: 0.01 THOUSAND/UL (ref 0–0.2)
IMM GRANULOCYTES NFR BLD AUTO: 0 % (ref 0–2)
LYMPHOCYTES # BLD AUTO: 1.4 THOUSANDS/ÂΜL (ref 0.6–4.47)
LYMPHOCYTES NFR BLD AUTO: 40 % (ref 14–44)
MCH RBC QN AUTO: 33.6 PG (ref 26.8–34.3)
MCHC RBC AUTO-ENTMCNC: 34 G/DL (ref 31.4–37.4)
MCV RBC AUTO: 99 FL (ref 82–98)
MONOCYTES # BLD AUTO: 0.49 THOUSAND/ÂΜL (ref 0.17–1.22)
MONOCYTES NFR BLD AUTO: 14 % (ref 4–12)
NEUTROPHILS # BLD AUTO: 1.46 THOUSANDS/ÂΜL (ref 1.85–7.62)
NEUTS SEG NFR BLD AUTO: 42 % (ref 43–75)
NRBC BLD AUTO-RTO: 0 /100 WBCS
PLATELET # BLD AUTO: 102 THOUSANDS/UL (ref 149–390)
PMV BLD AUTO: 10.9 FL (ref 8.9–12.7)
POTASSIUM SERPL-SCNC: 4.2 MMOL/L (ref 3.5–5.3)
PROT SERPL-MCNC: 6.1 G/DL (ref 6.4–8.4)
RBC # BLD AUTO: 3.93 MILLION/UL (ref 3.81–5.12)
SODIUM SERPL-SCNC: 141 MMOL/L (ref 135–147)
WBC # BLD AUTO: 3.48 THOUSAND/UL (ref 4.31–10.16)

## 2024-08-21 PROCEDURE — 99284 EMERGENCY DEPT VISIT MOD MDM: CPT

## 2024-08-21 PROCEDURE — 85652 RBC SED RATE AUTOMATED: CPT

## 2024-08-21 PROCEDURE — 36415 COLL VENOUS BLD VENIPUNCTURE: CPT

## 2024-08-21 PROCEDURE — 80053 COMPREHEN METABOLIC PANEL: CPT

## 2024-08-21 PROCEDURE — 85025 COMPLETE CBC W/AUTO DIFF WBC: CPT

## 2024-08-21 PROCEDURE — 73630 X-RAY EXAM OF FOOT: CPT

## 2024-08-21 PROCEDURE — 86140 C-REACTIVE PROTEIN: CPT

## 2024-08-21 RX ORDER — CEPHALEXIN 500 MG/1
500 CAPSULE ORAL 4 TIMES DAILY
Qty: 28 CAPSULE | Refills: 0 | Status: SHIPPED | OUTPATIENT
Start: 2024-08-21 | End: 2024-08-28

## 2024-08-21 RX ORDER — SULFAMETHOXAZOLE/TRIMETHOPRIM 800-160 MG
1 TABLET ORAL 2 TIMES DAILY
Qty: 14 TABLET | Refills: 0 | Status: SHIPPED | OUTPATIENT
Start: 2024-08-21 | End: 2024-08-28

## 2024-08-21 NOTE — DISCHARGE INSTRUCTIONS
Please take the antibiotics that have been sent to your pharmacy.  Please follow-up with your primary care provider for cardiac clearance prior to your surgery that is scheduled for August 30, 2024.  For any other questions, please contact your podiatrist, Dr. Moralez.    Please return to the emergency department if you are experiencing any new or worsening symptoms, including, fever, chills, inability to walk, worsening appearance of your wound, warmth, redness, drainage, or any new or worsening symptoms.

## 2024-08-21 NOTE — ED NOTES
The pt was initially refusing to leave the ER. ER Charge RN and this ER RN along with ONEL CHAKRABORTY spoke with this pt regarding why she is being discharged from [facility] ER. The pt ambulated to the bathroom with steayd gait while using her home cane. This ER RN assisted this pt the waiting room via [facility] Wheelchair. ER Charge RN is arranging a ride home for this pt.     Carolina Green RN  08/21/24 0046

## 2024-08-22 ENCOUNTER — TELEPHONE (OUTPATIENT)
Age: 69
End: 2024-08-22

## 2024-08-22 NOTE — ED PROVIDER NOTES
"History  Chief Complaint   Patient presents with    Toe Pain     Pt reports infect to 2nd toe after previous amputation. Pt on abx      Patient is a 68-year-old female with past medical history significant for anemia, anxiety, cirrhosis, depression, fibromyalgia, hepatic encephalopathy and a recent partial IPJ amputation of the right great toe due to osteomyelitis, presenting to the emergency department because she was \"told by Dr. Moralez to go to the Emergency Department to get tests done.\"  No recent encounters noted in patient's chart from podiatry, care everywhere also checked.  The patient states that she only came here because she was advised to do so, but also does report experiencing pain in her right foot, which she reports is chronic in nature. She denies any new or worsening symptoms, such as fever, chills, increased pain, swelling, redness or drainage from the surgical site.      Toe Pain  Associated symptoms: no abdominal pain, no chest pain, no cough, no fever, no nausea, no rash, no shortness of breath and no vomiting        Prior to Admission Medications   Prescriptions Last Dose Informant Patient Reported? Taking?   ALPRAZolam (XANAX) 0.5 mg tablet   No No   Sig: Take 2 tablets of Xanax per day as needed for severe anxiety.  For particularly severe anxiety you may take a maximum of 3 tablets.  Do not regularly use more than 2 tablets/day.   Ipratropium-Albuterol (COMBIVENT IN)  Self Yes No   Sig: Inhale 2 (two) times a day   Patient not taking: Reported on 6/21/2024   Melatonin 10 MG TABS   Yes No   Sig: Take by mouth   carbamide peroxide (DEBROX) 6.5 % otic solution   No No   Sig: Administer 5 drops into both ears 2 (two) times a day   fluticasone-vilanterol (BREO ELLIPTA) 100-25 mcg/inh inhaler  Self Yes No   Sig: Inhale 1 puff daily Rinse mouth after use.   folic acid (FOLVITE) 1 mg tablet  Self Yes No   Sig: Take by mouth daily   furosemide (LASIX) 20 mg tablet   No No   Sig: Take 20 mg daily " alternating with 40 mg daily.   gabapentin (NEURONTIN) 300 mg capsule   No No   Sig: Take 1 capsule (300 mg total) by mouth 2 (two) times a day   ipratropium (ATROVENT HFA) 17 mcg/act inhaler   No No   Sig: Inhale 2 puffs 4 (four) times a day as needed for wheezing   lactulose (CHRONULAC) 10 g/15 mL solution   No No   Sig: Take 30 mL (20 g total) by mouth 3 (three) times a day   magnesium hydroxide (MILK OF MAGNESIA) 400 mg/5 mL oral suspension   Yes No   Sig: Take by mouth daily as needed for constipation   meclizine (ANTIVERT) 12.5 MG tablet   No No   Sig: Take 1 tablet (12.5 mg total) by mouth every 8 (eight) hours as needed for dizziness   metoprolol succinate (TOPROL-XL) 25 mg 24 hr tablet  Self Yes No   Sig: Take 12.5 mg by mouth daily   nicotine (NICODERM CQ) 14 mg/24hr TD 24 hr patch   No No   Sig: Place 1 patch on the skin over 24 hours daily   Patient not taking: Reported on 6/21/2024   pantoprazole (PROTONIX) 40 mg tablet   No No   Sig: Take 1 tablet (40 mg total) by mouth daily   spironolactone (ALDACTONE) 50 mg tablet  Self Yes No   Sig: Take 50 mg by mouth 2 (two) times a day   thiamine (VITAMIN B1) 100 mg tablet   Yes No   Sig: Take 100 mg by mouth daily      Facility-Administered Medications: None       Past Medical History:   Diagnosis Date    Anemia     Anxiety     Ascites     Cervical cancer (HCC)     Chronic pain     Cirrhosis (HCC)     CVA (cerebral vascular accident) (HCC)     Depression     Fibromyalgia     Hepatic encephalopathy (HCC)     Opioid dependence in remission (HCC) 11/7/2022       Past Surgical History:   Procedure Laterality Date    COLONOSCOPY  07/14/2015    COLONOSCOPY  03/15/2013    Hemorrhoids    EGD  12/23/2014    HYSTERECTOMY      PA AMPUTATION TOE INTERPHALANGEAL JOINT Right 6/7/2024    Procedure: AMPUTATION RIGHT GREAT TOE;  Surgeon: Niru Baldwin DPM;  Location:  MAIN OR;  Service: Podiatry       Family History   Problem Relation Age of Onset    Lung cancer Mother      Cirrhosis Father     Cancer Sister     Colon polyps Neg Hx     Colon cancer Neg Hx      I have reviewed and agree with the history as documented.    E-Cigarette/Vaping    E-Cigarette Use Never User      E-Cigarette/Vaping Substances    Nicotine No     THC No     CBD No     Flavoring No     Other No     Unknown No      Social History     Tobacco Use    Smoking status: Every Day     Passive exposure: Current    Smokeless tobacco: Never   Vaping Use    Vaping status: Never Used   Substance Use Topics    Alcohol use: Not Currently     Comment: Had been drinking anywhere from 3-5 drinks of hard alcohol daily for the past 2 years, quit 8/17       Review of Systems   Constitutional:  Negative for chills and fever.   Respiratory:  Negative for cough and shortness of breath.    Cardiovascular:  Negative for chest pain and leg swelling.   Gastrointestinal:  Negative for abdominal pain, nausea and vomiting.   Musculoskeletal:  Positive for arthralgias (right foot). Negative for back pain.   Skin:  Positive for wound. Negative for color change and rash.   Neurological:  Negative for dizziness, tremors, weakness and numbness.   All other systems reviewed and are negative.      Physical Exam  Physical Exam  Vitals and nursing note reviewed.   Constitutional:       General: She is not in acute distress.     Appearance: Normal appearance. She is well-developed.   HENT:      Head: Normocephalic and atraumatic.   Eyes:      Conjunctiva/sclera: Conjunctivae normal.   Cardiovascular:      Rate and Rhythm: Normal rate and regular rhythm.   Pulmonary:      Effort: Pulmonary effort is normal.   Musculoskeletal:         General: Tenderness present. No swelling.      Cervical back: Neck supple.      Comments: Partial amputation of the right great toe at the IP joint.  The surgical site is clean, dry and intact, with no signs of infection such as erythema, warmth or purulent discharge.  Wound also observed on the tip of the second toe and  plantar aspect of the third toe. Tenderness to right second toe.  Please see pictures below.  Sensation is intact.  Pedal pulses are palpable and equal bilaterally.  Motor strength is intact however slightly decreased due to pain.  The patient ambulates with cane.   Skin:     General: Skin is warm and dry.      Capillary Refill: Capillary refill takes less than 2 seconds.   Neurological:      Mental Status: She is alert.      Sensory: No sensory deficit.               Vital Signs  ED Triage Vitals   Temperature Pulse Respirations Blood Pressure SpO2   08/21/24 1436 08/21/24 1436 08/21/24 1436 08/21/24 1436 08/21/24 1436   97.8 °F (36.6 °C) 61 18 (!) 196/81 97 %      Temp Source Heart Rate Source Patient Position - Orthostatic VS BP Location FiO2 (%)   08/21/24 1436 08/21/24 1436 08/21/24 1436 08/21/24 1502 --   Temporal Monitor Sitting Right arm       Pain Score       08/21/24 1436       7           Vitals:    08/21/24 1436 08/21/24 1502 08/21/24 1738 08/21/24 1745   BP: (!) 196/81 160/69 160/69 142/89   Pulse: 61 60 58 64   Patient Position - Orthostatic VS: Sitting Sitting Lying          Visual Acuity  Visual Acuity      Flowsheet Row Most Recent Value   L Pupil Size (mm) 3   R Pupil Size (mm) 3            ED Medications  Medications - No data to display    Diagnostic Studies  Results Reviewed       Procedure Component Value Units Date/Time    Comprehensive metabolic panel [[id number]]  (Abnormal) Collected: 08/21/24 1543    Lab Status: Final result Specimen: Blood from Arm, Left Updated: 08/21/24 1637     Sodium 141 mmol/L      Potassium 4.2 mmol/L      Chloride 103 mmol/L      CO2 34 mmol/L      ANION GAP 4 mmol/L      BUN 12 mg/dL      Creatinine 0.80 mg/dL      Glucose 95 mg/dL      Calcium 9.9 mg/dL      Corrected Calcium 10.5 mg/dL      AST 24 U/L      ALT 11 U/L      Alkaline Phosphatase 103 U/L      Total Protein 6.1 g/dL      Albumin 3.2 g/dL      Total Bilirubin 1.39 mg/dL      eGFR 75 ml/min/1.73sq m      Narrative:      National Kid[employer] Foundation guidelines for Chronic Kidney Disease (CKD):     Stage 1 with normal or high GFR (GFR > 90 mL/min/1.73 square meters)    Stage 2 Mild CKD (GFR = 60-89 mL/min/1.73 square meters)    Stage 3A Moderate CKD (GFR = 45-59 mL/min/1.73 square meters)    Stage 3B Moderate CKD (GFR = 30-44 mL/min/1.73 square meters)    Stage 4 Severe CKD (GFR = 15-29 mL/min/1.73 square meters)    Stage 5 End Stage CKD (GFR <15 mL/min/1.73 square meters)  Note: GFR calculation is accurate only with a steady state creatinine    C-reactive protein [[id number]]  (Abnormal) Collected: 08/21/24 1543    Lab Status: Final result Specimen: Blood from Arm, Left Updated: 08/21/24 1637     CRP 10.7 mg/L     Sedimentation rate, automated [[id number]]  (Normal) Collected: 08/21/24 1543    Lab Status: Final result Specimen: Blood from Arm, Left Updated: 08/21/24 1615     Sed Rate 2 mm/hour     CBC and differential [[id number]]  (Abnormal) Collected: 08/21/24 1543    Lab Status: Final result Specimen: Blood from Arm, Left Updated: 08/21/24 1600     WBC 3.48 Thousand/uL      RBC 3.93 Million/uL      Hemoglobin 13.2 g/dL      Hematocrit 38.8 %      MCV 99 fL      MCH 33.6 pg      MCHC 34.0 g/dL      RDW 13.3 %      MPV 10.9 fL      Platelets 102 Thousands/uL      nRBC 0 /100 WBCs      Segmented % 42 %      Immature Grans % 0 %      Lymphocytes % 40 %      Monocytes % 14 %      Eosinophils Relative 3 %      Basophils Relative 1 %      Absolute Neutrophils 1.46 Thousands/µL      Absolute Immature Grans 0.01 Thousand/uL      Absolute Lymphocytes 1.40 Thousands/µL      Absolute Monocytes 0.49 Thousand/µL      Eosinophils Absolute 0.09 Thousand/µL      Basophils Absolute 0.03 Thousands/µL                    XR foot 3+ views RIGHT   Final Result by Danish Friend MD (08/21 1616)      Osteomyelitis involving the tuft of the second digit, mildly worsened since the prior exam.         Computerized Assisted Algorithm (CAA) may  "have been used to analyze all applicable images.         Workstation performed: [id number]                    Procedures  Procedures         ED Course  ED Course as of 08/22/24 0130   Wed Aug 21, 2024   1509 Message sent to Dr. Moralez as patient presented to the emergency department because she was \"told by Dr. Moralez to get tests done\"   1641 Messaged Dr. Hernandez regarding patient case   1706 Dr. Hernandez: \"I think you can just send her on PO antibiotics, she then needs to get her cardiac clearance which is what she needs before the surgery on the 30th. I spoke with Dr. Moralez about it.\"                                 SBIRT 20yo+      Flowsheet Row Most Recent Value   Initial Alcohol Screen: US AUDIT-C     1. How often do you have a drink containing alcohol? 0 Filed at: 08/21/2024 1441   2. How many drinks containing alcohol do you have on a typical day you are drinking?  0 Filed at: 08/21/2024 1441   3a. Male UNDER 65: How often do you have five or more drinks on one occasion? 0 Filed at: 08/21/2024 1441   3b. FEMALE Any Age, or MALE 65+: How often do you have 4 or more drinks on one occassion? 0 Filed at: 08/21/2024 1441   Audit-C Score 0 Filed at: 08/21/2024 1441   NANCY: How many times in the past year have you...    Used an illegal drug or used a prescription medication for non-medical reasons? Never Filed at: 08/21/2024 1441                      Medical Decision Making  The physical exam shows a healing surgical site with no immediate signs of infection or complication however patient reports pain at the second right toe.  Plan: CBC, CMP, ESR, sed rate, and a right foot x-ray.    X-ray shows osteomyelitis involving the tuft of the second digit, mildly worsened since the prior exam.  Lab results as well as x-ray results were discussed with podiatry on-call, Dr. Hernandez, who recommended patient be treated outpatient with antibiotics, Keflex and Bactrim. Dr. Hernandez discussed that patient's lab work " is otherwise reassuring, and physical exam without any ascending cellulitis, swollen leg or sepsis.     Antibiotics send to pharmacy.  Discussed with the patient that if she already is taking Keflex, she should avoid taking the new Keflex prescription that has been sent to pharmacy to prevent taking too much of the medication. Stressed the importance of following up with primary care provider for cardiac clearance for surgery that is set to take place on August 30, 2024. Provided return precautions.  Patient initially refusing to leave at discharge, however after thorough discussion, patient was found to be agreeable however still expressing frustration at time of discharge.    Amount and/or Complexity of Data Reviewed  Labs: ordered.  Radiology: ordered.    Risk  Prescription drug management.                 Disposition  Final diagnoses:   Chronic osteomyelitis (HCC)     Time reflects when diagnosis was documented in both MDM as applicable and the Disposition within this note       Time User Action Codes Description Comment    8/21/2024  5:31 PM Dia Huynh Add [M86.60] Chronic osteomyelitis (HCC)           ED Disposition       ED Disposition   Discharge    Condition   Stable    Date/Time   Wed Aug 21, 2024 1728    Comment   Finn Oleary discharge to home/self care.                   Follow-up Information       Follow up With Specialties Details Why Contact Info Additional Information    Scot Brink MD Internal Medicine Go to  As needed, If symptoms worsen, For wound re-check [address]  [address]  [facility] [address]  [phone]        [facility] Emergency Department Emergency Medicine Go to  As needed, For wound re-check, If symptoms worsen [address] [facility] [id number] [phone] [facility] Emergency Department, [address] [place], [place] [address]            Discharge Medication List as of 8/21/2024   5:36 PM        START taking these medications    Details   cephalexin (KEFLEX) 500 mg capsule Take 1 capsule (500 mg total) by mouth 4 (four) times a day for 7 days, Starting Wed 8/21/2024, Until Wed 8/28/2024, Normal      sulfamethoxazole-trimethoprim (BACTRIM DS) 800-160 mg per tablet Take 1 tablet by mouth 2 (two) times a day for 7 days smx-tmp DS (BACTRIM) 800-160 mg tabs (1tab q12 D10), Starting Wed 8/21/2024, Until Wed 8/28/2024, Normal           CONTINUE these medications which have NOT CHANGED    Details   ALPRAZolam (XANAX) 0.5 mg tablet Take 2 tablets of Xanax per day as needed for severe anxiety.  For particularly severe anxiety you may take a maximum of 3 tablets.  Do not regularly use more than 2 tablets/day., Normal      carbamide peroxide (DEBROX) 6.5 % otic solution Administer 5 drops into both ears 2 (two) times a day, Starting Mon 6/10/2024, Normal      fluticasone-vilanterol (BREO ELLIPTA) 100-25 mcg/inh inhaler Inhale 1 puff daily Rinse mouth after use., Historical Med      folic acid (FOLVITE) 1 mg tablet Take by mouth daily, Historical Med      furosemide (LASIX) 20 mg tablet Take 20 mg daily alternating with 40 mg daily., Normal      gabapentin (NEURONTIN) 300 mg capsule Take 1 capsule (300 mg total) by mouth 2 (two) times a day, Starting Mon 6/10/2024, Print      ipratropium (ATROVENT HFA) 17 mcg/act inhaler Inhale 2 puffs 4 (four) times a day as needed for wheezing, Starting Mon 6/10/2024, Normal      Ipratropium-Albuterol (COMBIVENT IN) Inhale 2 (two) times a day, Historical Med      lactulose (CHRONULAC) 10 g/15 mL solution Take 30 mL (20 g total) by mouth 3 (three) times a day, Starting Mon 6/10/2024, Normal      magnesium hydroxide (MILK OF MAGNESIA) 400 mg/5 mL oral suspension Take by mouth daily as needed for constipation, Historical Med      meclizine (ANTIVERT) 12.5 MG tablet Take 1 tablet (12.5 mg total) by mouth every 8 (eight) hours as needed for dizziness, Starting Mon 6/10/2024,  Normal      Melatonin 10 MG TABS Take by mouth, Historical Med      metoprolol succinate (TOPROL-XL) 25 mg 24 hr tablet Take 12.5 mg by mouth daily, Historical Med      nicotine (NICODERM CQ) 14 mg/24hr TD 24 hr patch Place 1 patch on the skin over 24 hours daily, Starting Tue 6/11/2024, Normal      pantoprazole (PROTONIX) 40 mg tablet Take 1 tablet (40 mg total) by mouth daily, Starting Fri 9/13/2019, Normal      spironolactone (ALDACTONE) 50 mg tablet Take 50 mg by mouth 2 (two) times a day, Historical Med      thiamine (VITAMIN B1) 100 mg tablet Take 100 mg by mouth daily, Historical Med             No discharge procedures on file.    PDMP Review         Value Time User    PDMP Reviewed  Yes 7/30/2024  3:44 PM Ana Paula Cadena DO            ED Provider  Electronically Signed by             Dia Huynh PA-C  08/22/24 0138

## 2024-08-22 NOTE — TELEPHONE ENCOUNTER
Hello,    Please advise if a forced appointment can be accommodated for the patient:    Call back #: Finn Oleary    Insurance: Medicare    Reason for appointment: post op/has issues w/ 2nd toe infection to bone now/was told to see Dr. Baldwin within a week for further care? This is right foot/SX foot. She also needs 48 hours to arrange transport as she does not drive. Please call back and advise/schedule. Thanks    Requested doctor and/or location: Dr. Baldwin/Deep[facility]      Thank you.

## 2024-08-23 ENCOUNTER — TELEPHONE (OUTPATIENT)
Age: 69
End: 2024-08-23

## 2024-08-29 NOTE — PROGRESS NOTES
"Patient ID: Finn Oleary is a 68 y.o. female Date of Birth 1955       Chief Complaint   Patient presents with    Foot Problem     Right - 2nd toe ulcer    Foot Pain     Right - 2nd toe    Ankle Injury     Left               Diagnosis:  1. Osteomyelitis of second toe of right foot (HCC)  -     Post Op Shoe  -     Transfer to other facility  2. Tobacco use disorder  3. Alcohol abuse  4. Skin ulcer of second toe, right, with necrosis of bone (HCC)  -     Post Op Shoe  -     Transfer to other facility    Bilateral pedal examination with socks and shoes removed.  I personally reviewed patient's medical records, right foot x-rays, specifically 8/21/2024 showing osteomyelitis/erosion of the distal tuft of the second distal phalanx which has gotten worse from prior x-ray.  Today we discussed the etiology, treatment options for osteomyelitis.  Sterile excisional subcuticular debridement was performed of right second toe, wound was dressed with Acticoat dry dressing, she is to leave dressing dry clean and intact, surgical shoe was dispensed.  Lengthy discussion was had with patient regarding her lack of compliance, she was supposed to have surgery today with another podiatrist and canceled, she does have some erythema to his second toe, I recommend she go to [facility] for IV antibiotics, x-ray, MRI and surgical planning for second toe amputation right foot.  She also lives by herself at home and will need placement after surgery.  Patient understands and agrees with the plan.      Debridement    Universal Protocol:  procedure performed by consultantConsent: Verbal consent obtained.  Risks and benefits: risks, benefits and alternatives were discussed  Consent given by: patient  Time out: Immediately prior to procedure a \"time out\" was called to verify the correct patient, procedure, equipment, support staff and site/side marked as required.  Patient understanding: patient states understanding of the " "procedure being performed  Patient identity confirmed: verbally with patient    Debridement Details  Performed by: physician  Debridement type: surgical  Level of debridement: subcutaneous tissue  Pain control: none      Post-debridement measurements  Length (cm): 0.4  Width (cm): 0.4  Depth (cm): 0.3  Percent debrided: 100%  Surface Area (cm^2): 0.16  Area Debrided (cm^2): 0.16  Volume (cm^3): 0.05    Tissue and other material debrided: subcutaneous tissue  Devitalized tissue debrided: biofilm, callus, fibrin, necrotic debris and slough  Instrument(s) utilized: blade  Bleeding: small  Hemostasis obtained with: pressure  Procedural pain (0-10): insensate  Post-procedural pain: insensate   Response to treatment: procedure was tolerated well         Subjective:   Finn presents today for second opinion of osteomyelitis of her second toe right foot, she has been following with Dr. Flores and was scheduled to have a partial second toe amputation and presents today for second opinion.  She states she canceled the surgery for today because he was going to do it outpatient and she lives at home by herself and cannot manage.  She presents today wearing a sneaker with no dressing on her wound, no sock, the wound is soiled and lots of baby powder all over it.          The following portions of the patient's history were reviewed and updated as appropriate: allergies, current medications, past family history, past medical history, past social history, past surgical history, and problem list.        Objective:  /58 (BP Location: Left arm, Patient Position: Sitting, Cuff Size: Adult)   Pulse 65   Temp (!) 97.3 °F (36.3 °C) (Temporal)   Ht 5' 3\" (1.6 m)   Wt 55.3 kg (122 lb)   LMP  (LMP Unknown)   BMI 21.61 kg/m²     Review of Systems   Constitutional:  Negative for chills and fever.   HENT:  Negative for ear pain and sore throat.    Eyes:  Negative for pain and visual disturbance.   Respiratory:  Negative for cough " and shortness of breath.    Cardiovascular:  Negative for chest pain and palpitations.   Gastrointestinal:  Negative for abdominal pain and vomiting.   Genitourinary:  Negative for dysuria and hematuria.   Musculoskeletal:  Negative for arthralgias and back pain.   Skin:  Negative for color change and rash.        Ulceration second toe right foot   Neurological:  Negative for seizures and syncope.   All other systems reviewed and are negative.      Physical Exam  Constitutional:       Appearance: Normal appearance. She is well-developed and normal weight.   HENT:      Head: Normocephalic and atraumatic.      Mouth/Throat:      Mouth: Mucous membranes are moist.      Pharynx: Oropharynx is clear.   Eyes:      Conjunctiva/sclera: Conjunctivae normal.      Pupils: Pupils are equal, round, and reactive to light.   Cardiovascular:      Pulses:           Dorsalis pedis pulses are 2+ on the right side and 2+ on the left side.        Posterior tibial pulses are 2+ on the right side and 2+ on the left side.   Pulmonary:      Effort: Pulmonary effort is normal.   Musculoskeletal:         General: Normal range of motion.      Cervical back: Normal range of motion.      Right lower leg: No edema.      Left lower leg: No edema.      Right foot: Deformity present.      Left foot: Deformity present.   Feet:      Right foot:      Protective Sensation: 10 sites tested.  0 sites sensed.      Skin integrity: Ulcer present.      Toenail Condition: Right toenails are normal.      Left foot:      Protective Sensation: 10 sites tested.  0 sites sensed.      Skin integrity: Skin integrity normal.      Toenail Condition: Left toenails are normal.      Comments: Prior bilateral hallux amputations.  Second toe right foot with ulceration at distal plantar tip, predebridement measures 0.2 x 0.2 x 0.2, does probe to bone, there is hyperkeratotic cover, lots of baby powder all over it, second digit is edematous, erythematous and sausage, no  purulence or malodor is noted.  Remainder of toes and skin is within normal limits.  Skin:     General: Skin is warm and dry.      Capillary Refill: Capillary refill takes less than 2 seconds.   Neurological:      General: No focal deficit present.      Mental Status: She is alert and oriented to person, place, and time. Mental status is at baseline.      Sensory: Sensory deficit present.      Coordination: Coordination abnormal.      Gait: Gait abnormal.      Deep Tendon Reflexes: Reflexes abnormal.   Psychiatric:         Mood and Affect: Mood normal.         Behavior: Behavior normal.         Thought Content: Thought content normal.         Judgment: Judgment normal.              XR foot 3+ views RIGHT    Result Date: 8/21/2024  Narrative: XR FOOT 3+ VW RIGHT INDICATION: osteo. COMPARISON: 8/5/2024 FINDINGS: Patient is status post resection of the first digit at the level of the interphalangeal joint. Cortical margins are sharp. There is erosive change along the tuft of the second digit, worsened and suspicious for progressive osteomyelitis. There is overlying soft tissue ulceration. No lytic or blastic osseous lesion. Unremarkable soft tissues.     Impression: Osteomyelitis involving the tuft of the second digit, mildly worsened since the prior exam. Computerized Assisted Algorithm (CAA) may have been used to analyze all applicable images. Workstation performed: [id number]     XR foot 3+ vw right    Result Date: 8/6/2024  Narrative: XR FOOT 3+ VW RIGHT INDICATION: L89.892: Pressure ulcer of other site, stage 2. COMPARISON: Radiographs of the right foot 7/15/2024. FINDINGS: No acute fracture or dislocation. No significant degenerative changes. Decreased cortical conspicuity at the distal phalanx of the second toe. Soft tissue ulceration and subcutaneous edema at the tip of the second toe. Subcutaneous edema of the first toe.     Impression: Decreased cortical conspicuity at the distal phalanx of the second toe  "with overlying soft tissue ulceration which is suspicious for osteomyelitis. Resident: Dominik Lopez I, the attending radiologist, have reviewed the images and agree with the final report above. Workstation performed: [id number]               Niru Baldwin DPM, LUCINA, CLAUDIA    Portions of the record may have been created with voice recognition software. Occasional wrong word or \"sound a like\" substitutions may have occurred due to the inherent limitations of voice recognition software. Read the chart carefully and recognize, using context, where substitutions have occurred.  "

## 2024-08-30 ENCOUNTER — OFFICE VISIT (OUTPATIENT)
Dept: PODIATRY | Facility: CLINIC | Age: 69
End: 2024-08-30
Payer: MEDICARE

## 2024-08-30 VITALS
HEIGHT: 63 IN | WEIGHT: 122 LBS | HEART RATE: 65 BPM | TEMPERATURE: 97.3 F | DIASTOLIC BLOOD PRESSURE: 58 MMHG | BODY MASS INDEX: 21.62 KG/M2 | SYSTOLIC BLOOD PRESSURE: 113 MMHG

## 2024-08-30 DIAGNOSIS — F17.200 TOBACCO USE DISORDER: ICD-10-CM

## 2024-08-30 DIAGNOSIS — M86.9 OSTEOMYELITIS OF SECOND TOE OF RIGHT FOOT (HCC): Primary | ICD-10-CM

## 2024-08-30 DIAGNOSIS — F10.10 ALCOHOL ABUSE: ICD-10-CM

## 2024-08-30 DIAGNOSIS — L97.514: ICD-10-CM

## 2024-08-30 PROCEDURE — 99213 OFFICE O/P EST LOW 20 MIN: CPT | Performed by: PODIATRIST

## 2024-08-30 PROCEDURE — 11042 DBRDMT SUBQ TIS 1ST 20SQCM/<: CPT | Performed by: PODIATRIST

## 2024-08-30 RX ORDER — METHOCARBAMOL 500 MG/1
TABLET, FILM COATED ORAL
Status: ON HOLD | COMMUNITY
Start: 2024-07-04

## 2024-08-30 RX ORDER — ALBUTEROL SULFATE 90 UG/1
AEROSOL, METERED RESPIRATORY (INHALATION)
Status: ON HOLD | COMMUNITY
Start: 2024-08-06

## 2024-09-01 ENCOUNTER — APPOINTMENT (EMERGENCY)
Dept: RADIOLOGY | Facility: HOSPITAL | Age: 69
DRG: 504 | End: 2024-09-01
Payer: MEDICARE

## 2024-09-01 ENCOUNTER — NURSE TRIAGE (OUTPATIENT)
Dept: OTHER | Facility: OTHER | Age: 69
End: 2024-09-01

## 2024-09-01 ENCOUNTER — HOSPITAL ENCOUNTER (INPATIENT)
Facility: HOSPITAL | Age: 69
LOS: 4 days | Discharge: NURSING, REHAB OR LONG-TERM CARE | DRG: 504 | End: 2024-09-05
Attending: EMERGENCY MEDICINE | Admitting: HOSPITALIST
Payer: MEDICARE

## 2024-09-01 DIAGNOSIS — M86.9 OSTEOMYELITIS OF SECOND TOE OF RIGHT FOOT (HCC): Primary | ICD-10-CM

## 2024-09-01 DIAGNOSIS — L97.514: ICD-10-CM

## 2024-09-01 LAB
ALBUMIN SERPL BCG-MCNC: 3.3 G/DL (ref 3.5–5)
ALP SERPL-CCNC: 121 U/L (ref 34–104)
ALT SERPL W P-5'-P-CCNC: 17 U/L (ref 7–52)
ANION GAP SERPL CALCULATED.3IONS-SCNC: 4 MMOL/L (ref 4–13)
APTT PPP: 30 SECONDS (ref 23–34)
AST SERPL W P-5'-P-CCNC: 38 U/L (ref 13–39)
ATRIAL RATE: 71 BPM
BASOPHILS # BLD AUTO: 0.03 THOUSANDS/ÂΜL (ref 0–0.1)
BASOPHILS NFR BLD AUTO: 1 % (ref 0–1)
BILIRUB SERPL-MCNC: 1.04 MG/DL (ref 0.2–1)
BUN SERPL-MCNC: 12 MG/DL (ref 5–25)
CALCIUM ALBUM COR SERPL-MCNC: 10.6 MG/DL (ref 8.3–10.1)
CALCIUM SERPL-MCNC: 10 MG/DL (ref 8.4–10.2)
CHLORIDE SERPL-SCNC: 99 MMOL/L (ref 96–108)
CO2 SERPL-SCNC: 35 MMOL/L (ref 21–32)
CREAT SERPL-MCNC: 0.93 MG/DL (ref 0.6–1.3)
CRP SERPL QL: 2.3 MG/L
EOSINOPHIL # BLD AUTO: 0.16 THOUSAND/ÂΜL (ref 0–0.61)
EOSINOPHIL NFR BLD AUTO: 4 % (ref 0–6)
ERYTHROCYTE [DISTWIDTH] IN BLOOD BY AUTOMATED COUNT: 12.9 % (ref 11.6–15.1)
ERYTHROCYTE [SEDIMENTATION RATE] IN BLOOD: 1 MM/HOUR (ref 0–29)
GFR SERPL CREATININE-BSD FRML MDRD: 62 ML/MIN/1.73SQ M
GLUCOSE SERPL-MCNC: 95 MG/DL (ref 65–140)
HCT VFR BLD AUTO: 39.5 % (ref 34.8–46.1)
HGB BLD-MCNC: 13.4 G/DL (ref 11.5–15.4)
IMM GRANULOCYTES # BLD AUTO: 0.02 THOUSAND/UL (ref 0–0.2)
IMM GRANULOCYTES NFR BLD AUTO: 1 % (ref 0–2)
INR PPP: 1.25 (ref 0.85–1.19)
LACTATE SERPL-SCNC: 1.2 MMOL/L (ref 0.5–2)
LYMPHOCYTES # BLD AUTO: 1.71 THOUSANDS/ÂΜL (ref 0.6–4.47)
LYMPHOCYTES NFR BLD AUTO: 41 % (ref 14–44)
MCH RBC QN AUTO: 33.1 PG (ref 26.8–34.3)
MCHC RBC AUTO-ENTMCNC: 33.9 G/DL (ref 31.4–37.4)
MCV RBC AUTO: 98 FL (ref 82–98)
MONOCYTES # BLD AUTO: 0.47 THOUSAND/ÂΜL (ref 0.17–1.22)
MONOCYTES NFR BLD AUTO: 11 % (ref 4–12)
NEUTROPHILS # BLD AUTO: 1.75 THOUSANDS/ÂΜL (ref 1.85–7.62)
NEUTS SEG NFR BLD AUTO: 42 % (ref 43–75)
NRBC BLD AUTO-RTO: 0 /100 WBCS
P AXIS: 76 DEGREES
PLATELET # BLD AUTO: 118 THOUSANDS/UL (ref 149–390)
PMV BLD AUTO: 10.4 FL (ref 8.9–12.7)
POTASSIUM SERPL-SCNC: 4.4 MMOL/L (ref 3.5–5.3)
PR INTERVAL: 132 MS
PROCALCITONIN SERPL-MCNC: <0.05 NG/ML
PROT SERPL-MCNC: 6.1 G/DL (ref 6.4–8.4)
PROTHROMBIN TIME: 16.2 SECONDS (ref 12.3–15)
QRS AXIS: 76 DEGREES
QRSD INTERVAL: 80 MS
QT INTERVAL: 396 MS
QTC INTERVAL: 430 MS
RBC # BLD AUTO: 4.05 MILLION/UL (ref 3.81–5.12)
SODIUM SERPL-SCNC: 138 MMOL/L (ref 135–147)
T WAVE AXIS: 74 DEGREES
VENTRICULAR RATE: 71 BPM
WBC # BLD AUTO: 4.14 THOUSAND/UL (ref 4.31–10.16)

## 2024-09-01 PROCEDURE — 84145 PROCALCITONIN (PCT): CPT | Performed by: EMERGENCY MEDICINE

## 2024-09-01 PROCEDURE — 99285 EMERGENCY DEPT VISIT HI MDM: CPT | Performed by: EMERGENCY MEDICINE

## 2024-09-01 PROCEDURE — 93010 ELECTROCARDIOGRAM REPORT: CPT | Performed by: INTERNAL MEDICINE

## 2024-09-01 PROCEDURE — 85025 COMPLETE CBC W/AUTO DIFF WBC: CPT | Performed by: EMERGENCY MEDICINE

## 2024-09-01 PROCEDURE — 93005 ELECTROCARDIOGRAM TRACING: CPT

## 2024-09-01 PROCEDURE — 85610 PROTHROMBIN TIME: CPT | Performed by: EMERGENCY MEDICINE

## 2024-09-01 PROCEDURE — 86140 C-REACTIVE PROTEIN: CPT | Performed by: EMERGENCY MEDICINE

## 2024-09-01 PROCEDURE — 85730 THROMBOPLASTIN TIME PARTIAL: CPT | Performed by: EMERGENCY MEDICINE

## 2024-09-01 PROCEDURE — 80053 COMPREHEN METABOLIC PANEL: CPT | Performed by: EMERGENCY MEDICINE

## 2024-09-01 PROCEDURE — 73630 X-RAY EXAM OF FOOT: CPT

## 2024-09-01 PROCEDURE — 99284 EMERGENCY DEPT VISIT MOD MDM: CPT

## 2024-09-01 PROCEDURE — 83605 ASSAY OF LACTIC ACID: CPT | Performed by: EMERGENCY MEDICINE

## 2024-09-01 PROCEDURE — 36415 COLL VENOUS BLD VENIPUNCTURE: CPT | Performed by: EMERGENCY MEDICINE

## 2024-09-01 PROCEDURE — 85652 RBC SED RATE AUTOMATED: CPT | Performed by: EMERGENCY MEDICINE

## 2024-09-01 PROCEDURE — 99223 1ST HOSP IP/OBS HIGH 75: CPT | Performed by: INTERNAL MEDICINE

## 2024-09-01 RX ORDER — FLUTICASONE FUROATE AND VILANTEROL 100; 25 UG/1; UG/1
1 POWDER RESPIRATORY (INHALATION) DAILY
Status: DISCONTINUED | OUTPATIENT
Start: 2024-09-02 | End: 2024-09-05 | Stop reason: HOSPADM

## 2024-09-01 RX ORDER — FOLIC ACID 1 MG/1
1 TABLET ORAL DAILY
Status: DISCONTINUED | OUTPATIENT
Start: 2024-09-02 | End: 2024-09-05 | Stop reason: HOSPADM

## 2024-09-01 RX ORDER — METOPROLOL TARTRATE 25 MG/1
12.5 TABLET, FILM COATED ORAL DAILY
COMMUNITY
End: 2024-09-05

## 2024-09-01 RX ORDER — METOPROLOL TARTRATE 25 MG/1
12.5 TABLET, FILM COATED ORAL DAILY
Status: DISCONTINUED | OUTPATIENT
Start: 2024-09-02 | End: 2024-09-03

## 2024-09-01 RX ORDER — LACTULOSE 10 G/15ML
20 SOLUTION ORAL 3 TIMES DAILY
Status: DISCONTINUED | OUTPATIENT
Start: 2024-09-02 | End: 2024-09-05 | Stop reason: HOSPADM

## 2024-09-01 RX ORDER — LANOLIN ALCOHOL/MO/W.PET/CERES
6 CREAM (GRAM) TOPICAL
Status: DISCONTINUED | OUTPATIENT
Start: 2024-09-01 | End: 2024-09-05 | Stop reason: HOSPADM

## 2024-09-01 RX ORDER — ACETAMINOPHEN 325 MG/1
650 TABLET ORAL EVERY 6 HOURS PRN
Status: DISCONTINUED | OUTPATIENT
Start: 2024-09-01 | End: 2024-09-05 | Stop reason: HOSPADM

## 2024-09-01 RX ORDER — FUROSEMIDE 20 MG
20 TABLET ORAL DAILY
Status: CANCELLED | OUTPATIENT
Start: 2024-09-02

## 2024-09-01 RX ORDER — CEFEPIME HYDROCHLORIDE 2 G/50ML
2000 INJECTION, SOLUTION INTRAVENOUS EVERY 12 HOURS
Status: DISCONTINUED | OUTPATIENT
Start: 2024-09-02 | End: 2024-09-05

## 2024-09-01 RX ORDER — HEPARIN SODIUM 5000 [USP'U]/ML
5000 INJECTION, SOLUTION INTRAVENOUS; SUBCUTANEOUS EVERY 8 HOURS SCHEDULED
Status: DISPENSED | OUTPATIENT
Start: 2024-09-02 | End: 2024-09-03

## 2024-09-01 RX ORDER — ALBUTEROL SULFATE 90 UG/1
2 AEROSOL, METERED RESPIRATORY (INHALATION) EVERY 6 HOURS PRN
Status: DISCONTINUED | OUTPATIENT
Start: 2024-09-01 | End: 2024-09-05 | Stop reason: HOSPADM

## 2024-09-01 RX ORDER — LANOLIN ALCOHOL/MO/W.PET/CERES
100 CREAM (GRAM) TOPICAL DAILY
Status: DISCONTINUED | OUTPATIENT
Start: 2024-09-02 | End: 2024-09-05 | Stop reason: HOSPADM

## 2024-09-01 RX ORDER — VANCOMYCIN HYDROCHLORIDE 1 G/200ML
1000 INJECTION, SOLUTION INTRAVENOUS EVERY 24 HOURS
Status: DISCONTINUED | OUTPATIENT
Start: 2024-09-02 | End: 2024-09-02

## 2024-09-01 RX ORDER — CEFEPIME HYDROCHLORIDE 2 G/50ML
2000 INJECTION, SOLUTION INTRAVENOUS ONCE
Status: COMPLETED | OUTPATIENT
Start: 2024-09-01 | End: 2024-09-01

## 2024-09-01 RX ORDER — ALPRAZOLAM 0.5 MG
0.5 TABLET ORAL 2 TIMES DAILY PRN
Status: DISCONTINUED | OUTPATIENT
Start: 2024-09-01 | End: 2024-09-05 | Stop reason: HOSPADM

## 2024-09-01 RX ORDER — GABAPENTIN 300 MG/1
300 CAPSULE ORAL 2 TIMES DAILY
Status: DISCONTINUED | OUTPATIENT
Start: 2024-09-01 | End: 2024-09-05 | Stop reason: HOSPADM

## 2024-09-01 RX ORDER — SPIRONOLACTONE 25 MG/1
50 TABLET ORAL DAILY
Status: DISCONTINUED | OUTPATIENT
Start: 2024-09-02 | End: 2024-09-03

## 2024-09-01 RX ORDER — PANTOPRAZOLE SODIUM 40 MG/1
40 TABLET, DELAYED RELEASE ORAL
Status: DISCONTINUED | OUTPATIENT
Start: 2024-09-02 | End: 2024-09-05 | Stop reason: HOSPADM

## 2024-09-01 RX ADMIN — VANCOMYCIN HYDROCHLORIDE 1500 MG: 1 INJECTION, POWDER, LYOPHILIZED, FOR SOLUTION INTRAVENOUS at 21:28

## 2024-09-01 RX ADMIN — Medication 6 MG: at 23:33

## 2024-09-01 RX ADMIN — GABAPENTIN 300 MG: 300 CAPSULE ORAL at 23:33

## 2024-09-01 RX ADMIN — ALPRAZOLAM 0.5 MG: 0.5 TABLET ORAL at 23:33

## 2024-09-01 RX ADMIN — CEFEPIME HYDROCHLORIDE 2000 MG: 2 INJECTION, SOLUTION INTRAVENOUS at 20:58

## 2024-09-01 NOTE — ASSESSMENT & PLAN NOTE
Known osteomyelitis of second toe of right foot. Follows with Dr Baldwin outpatient. Seen on 8/30 and encouraged to come to the hospital for abx and amputation.   Not meeting SIRS   CRP WNL  Plan:   Obtain MRI   IV abx (Cef + Vanc)  Plan for surgery tentatively Tuesday.  Patient unable to care for self post surgery. Consult CM  Consult podiatry

## 2024-09-01 NOTE — TELEPHONE ENCOUNTER
"Answer Assessment - Initial Assessment Questions  1. ONSET: \"When did the pain start?\"       Chronic issue    2. LOCATION: \"Where is the pain located?\"       Right foot    3. PAIN: \"How bad is the pain?\"    (Scale 1-10; or mild, moderate, severe)   - MILD (1-3): doesn't interfere with normal activities.    - MODERATE (4-7): interferes with normal activities (e.g., work or school) or awakens from sleep, limping.    - SEVERE (8-10): excruciating pain, unable to do any normal activities, unable to walk.       Moderate    4. WORK OR EXERCISE: \"Has there been any recent work or exercise that involved this part of the body?\"       No    5. CAUSE: \"What do you think is causing the foot pain?\"      Wound    6. OTHER SYMPTOMS: \"Do you have any other symptoms?\" (e.g., leg pain, rash, fever, numbness)      Pt has chronic foot wound to left middle toe. Now having swelling to leg.    Protocols used: Foot Pain-ADULT-AH      Pt states she is supposed to be scheduled for toe amputation for Tuesday 9/3. Says she was told to come to the hospital today for IV antibiotics but she cannot get a hold of her son to bring her.   Do not see any procedures/surgeries scheduled for Tuesday. Do see last appt with Dr. Baldwin on 8/30 pt was advised to go to ED at [facility] for IV antibiotics and appears that pt never arrived.     Reaching out to on call for Podiatry for clarification.     Per Dr. Hernandez- Dr. Willie johnson was for pt to go to the ED to get admitted and then be put on the surgical schedule for Tuesday. Pt should hopefully somehow find a ridge to the hospital in order for her to have surgery. Not appropriate to call 911 for this.     Recommendation discussed with pt and reiterated to pt if she can call someone to bring her to the ED. Pt states she lives alone and does not have anyone. All of her children live out of state and her one son who lives close by is not answering his phone. States that her leg is now swollen and " discolored. States that this is a new symptoms since being seen and is now concerned that infection is spreading. To prevent worsening symptoms recommended for pt to call EMS to get her to the  ED. Pt verbalized understanding and stated that she will call 911.

## 2024-09-01 NOTE — ASSESSMENT & PLAN NOTE
History of alcoholic cirrhosis complicated by ascites, esophageal varices and hepatic encephalopathy in the past  Home regimen: encouraged patient to use medications as they were prescribed  Lactulose tid   Only using daily   Lasix 20 mg alternating with 40 mg   Patient only using prn   Ordered 20 mg daily while inpatient   Spironolactone 50 mg bid   Patient only using daily  MELD-Na: 11

## 2024-09-01 NOTE — TELEPHONE ENCOUNTER
Reason for Disposition  • Patient sounds very sick or weak to the triager    Protocols used: Foot Pain-ADULT-AH

## 2024-09-02 ENCOUNTER — APPOINTMENT (INPATIENT)
Dept: MRI IMAGING | Facility: HOSPITAL | Age: 69
DRG: 504 | End: 2024-09-02
Payer: MEDICARE

## 2024-09-02 LAB
ANION GAP SERPL CALCULATED.3IONS-SCNC: 1 MMOL/L (ref 4–13)
BASOPHILS # BLD AUTO: 0.02 THOUSANDS/ÂΜL (ref 0–0.1)
BASOPHILS NFR BLD AUTO: 1 % (ref 0–1)
BUN SERPL-MCNC: 15 MG/DL (ref 5–25)
CALCIUM SERPL-MCNC: 9.4 MG/DL (ref 8.4–10.2)
CHLORIDE SERPL-SCNC: 104 MMOL/L (ref 96–108)
CO2 SERPL-SCNC: 32 MMOL/L (ref 21–32)
CREAT SERPL-MCNC: 0.8 MG/DL (ref 0.6–1.3)
EOSINOPHIL # BLD AUTO: 0.17 THOUSAND/ÂΜL (ref 0–0.61)
EOSINOPHIL NFR BLD AUTO: 5 % (ref 0–6)
ERYTHROCYTE [DISTWIDTH] IN BLOOD BY AUTOMATED COUNT: 13 % (ref 11.6–15.1)
GFR SERPL CREATININE-BSD FRML MDRD: 75 ML/MIN/1.73SQ M
GLUCOSE SERPL-MCNC: 113 MG/DL (ref 65–140)
HCT VFR BLD AUTO: 36.7 % (ref 34.8–46.1)
HGB BLD-MCNC: 12.4 G/DL (ref 11.5–15.4)
IMM GRANULOCYTES # BLD AUTO: 0.01 THOUSAND/UL (ref 0–0.2)
IMM GRANULOCYTES NFR BLD AUTO: 0 % (ref 0–2)
LYMPHOCYTES # BLD AUTO: 1.44 THOUSANDS/ÂΜL (ref 0.6–4.47)
LYMPHOCYTES NFR BLD AUTO: 45 % (ref 14–44)
MAGNESIUM SERPL-MCNC: 1.5 MG/DL (ref 1.9–2.7)
MCH RBC QN AUTO: 33.2 PG (ref 26.8–34.3)
MCHC RBC AUTO-ENTMCNC: 33.8 G/DL (ref 31.4–37.4)
MCV RBC AUTO: 98 FL (ref 82–98)
MONOCYTES # BLD AUTO: 0.38 THOUSAND/ÂΜL (ref 0.17–1.22)
MONOCYTES NFR BLD AUTO: 12 % (ref 4–12)
NEUTROPHILS # BLD AUTO: 1.17 THOUSANDS/ÂΜL (ref 1.85–7.62)
NEUTS SEG NFR BLD AUTO: 37 % (ref 43–75)
NRBC BLD AUTO-RTO: 0 /100 WBCS
PHOSPHATE SERPL-MCNC: 2.7 MG/DL (ref 2.3–4.1)
PLATELET # BLD AUTO: 84 THOUSANDS/UL (ref 149–390)
PLATELET BLD QL SMEAR: ABNORMAL
PMV BLD AUTO: 12 FL (ref 8.9–12.7)
POTASSIUM SERPL-SCNC: 4.9 MMOL/L (ref 3.5–5.3)
RBC # BLD AUTO: 3.73 MILLION/UL (ref 3.81–5.12)
RBC MORPH BLD: NORMAL
SODIUM SERPL-SCNC: 137 MMOL/L (ref 135–147)
WBC # BLD AUTO: 3.19 THOUSAND/UL (ref 4.31–10.16)

## 2024-09-02 PROCEDURE — 99232 SBSQ HOSP IP/OBS MODERATE 35: CPT | Performed by: INTERNAL MEDICINE

## 2024-09-02 PROCEDURE — 99223 1ST HOSP IP/OBS HIGH 75: CPT | Performed by: PODIATRIST

## 2024-09-02 PROCEDURE — 83735 ASSAY OF MAGNESIUM: CPT | Performed by: HOSPITALIST

## 2024-09-02 PROCEDURE — 84100 ASSAY OF PHOSPHORUS: CPT | Performed by: HOSPITALIST

## 2024-09-02 PROCEDURE — 73718 MRI LOWER EXTREMITY W/O DYE: CPT

## 2024-09-02 PROCEDURE — 80048 BASIC METABOLIC PNL TOTAL CA: CPT | Performed by: INTERNAL MEDICINE

## 2024-09-02 PROCEDURE — 85025 COMPLETE CBC W/AUTO DIFF WBC: CPT | Performed by: INTERNAL MEDICINE

## 2024-09-02 RX ORDER — MAGNESIUM SULFATE HEPTAHYDRATE 40 MG/ML
2 INJECTION, SOLUTION INTRAVENOUS ONCE
Status: COMPLETED | OUTPATIENT
Start: 2024-09-02 | End: 2024-09-02

## 2024-09-02 RX ADMIN — MAGNESIUM SULFATE HEPTAHYDRATE 2 G: 2 INJECTION, SOLUTION INTRAVENOUS at 11:14

## 2024-09-02 RX ADMIN — SPIRONOLACTONE 50 MG: 25 TABLET ORAL at 11:21

## 2024-09-02 RX ADMIN — HEPARIN SODIUM 5000 UNITS: 5000 INJECTION, SOLUTION INTRAVENOUS; SUBCUTANEOUS at 21:35

## 2024-09-02 RX ADMIN — CEFEPIME HYDROCHLORIDE 2000 MG: 2 INJECTION, SOLUTION INTRAVENOUS at 21:35

## 2024-09-02 RX ADMIN — METOPROLOL TARTRATE 12.5 MG: 25 TABLET, FILM COATED ORAL at 08:34

## 2024-09-02 RX ADMIN — ALPRAZOLAM 0.5 MG: 0.5 TABLET ORAL at 19:32

## 2024-09-02 RX ADMIN — ALPRAZOLAM 0.5 MG: 0.5 TABLET ORAL at 08:37

## 2024-09-02 RX ADMIN — HEPARIN SODIUM 5000 UNITS: 5000 INJECTION, SOLUTION INTRAVENOUS; SUBCUTANEOUS at 06:56

## 2024-09-02 RX ADMIN — GABAPENTIN 300 MG: 300 CAPSULE ORAL at 21:35

## 2024-09-02 RX ADMIN — FOLIC ACID 1 MG: 1 TABLET ORAL at 08:35

## 2024-09-02 RX ADMIN — ALPRAZOLAM 0.5 MG: 0.5 TABLET ORAL at 23:37

## 2024-09-02 RX ADMIN — PANTOPRAZOLE SODIUM 40 MG: 40 TABLET, DELAYED RELEASE ORAL at 06:56

## 2024-09-02 RX ADMIN — GABAPENTIN 300 MG: 300 CAPSULE ORAL at 08:35

## 2024-09-02 RX ADMIN — Medication 6 MG: at 21:35

## 2024-09-02 RX ADMIN — VANCOMYCIN HYDROCHLORIDE 1250 MG: 5 INJECTION, POWDER, LYOPHILIZED, FOR SOLUTION INTRAVENOUS at 19:33

## 2024-09-02 RX ADMIN — CEFEPIME HYDROCHLORIDE 2000 MG: 2 INJECTION, SOLUTION INTRAVENOUS at 11:14

## 2024-09-02 RX ADMIN — Medication 100 MG: at 08:35

## 2024-09-02 NOTE — ED PROVIDER NOTES
History  Chief Complaint   Patient presents with    Medical Problem     Pt to ED via EMS from home with reports of having surgery scheduled on Tuesday for her toe but did not have a ride to get here, so she called EMS.      68 year old female presents for evaluation and admission due to increasing pain and swelling of her right foot associated with suspected osteomyelitis.  Patient has been following with Dr. Baldwin from podiatry and was advised to come to the ED for IV antibiotics and admission for likely amputation on 9/3/24.  Patient states she has taken several courses of oral antibiotics without improvement.  She denies fevers or chills.        Medical Problem      Prior to Admission Medications   Prescriptions Last Dose Informant Patient Reported? Taking?   ALPRAZolam (XANAX) 0.5 mg tablet   No No   Sig: Take 2 tablets of Xanax per day as needed for severe anxiety.  For particularly severe anxiety you may take a maximum of 3 tablets.  Do not regularly use more than 2 tablets/day.   Ipratropium-Albuterol (COMBIVENT IN)  Self Yes No   Sig: Inhale 2 (two) times a day   Melatonin 10 MG TABS   Yes No   Sig: Take by mouth   albuterol (PROVENTIL HFA,VENTOLIN HFA) 90 mcg/act inhaler   Yes No   Sig: INHALE 2 PUFFS EVERY 6 HOURS AS NEEDED FOR SHORTNESS OF BREATH OR WHEEZING   carbamide peroxide (DEBROX) 6.5 % otic solution   No No   Sig: Administer 5 drops into both ears 2 (two) times a day   fluticasone-vilanterol (BREO ELLIPTA) 100-25 mcg/inh inhaler  Self Yes No   Sig: Inhale 1 puff daily Rinse mouth after use.   folic acid (FOLVITE) 1 mg tablet  Self Yes No   Sig: Take by mouth daily   furosemide (LASIX) 20 mg tablet   No No   Sig: Take 20 mg daily alternating with 40 mg daily.   gabapentin (NEURONTIN) 300 mg capsule   No No   Sig: Take 1 capsule (300 mg total) by mouth 2 (two) times a day   ipratropium (ATROVENT HFA) 17 mcg/act inhaler   No No   Sig: Inhale 2 puffs 4 (four) times a day as needed for wheezing    lactulose (CHRONULAC) 10 g/15 mL solution   No No   Sig: Take 30 mL (20 g total) by mouth 3 (three) times a day   magnesium hydroxide (MILK OF MAGNESIA) 400 mg/5 mL oral suspension   Yes No   Sig: Take by mouth daily as needed for constipation   meclizine (ANTIVERT) 12.5 MG tablet   No No   Sig: Take 1 tablet (12.5 mg total) by mouth every 8 (eight) hours as needed for dizziness   methocarbamol (ROBAXIN) 500 mg tablet   Yes No   metoprolol succinate (TOPROL-XL) 25 mg 24 hr tablet  Self Yes No   Sig: Take 12.5 mg by mouth daily   nicotine (NICODERM CQ) 14 mg/24hr TD 24 hr patch   No No   Sig: Place 1 patch on the skin over 24 hours daily   Patient not taking: Reported on 6/21/2024   pantoprazole (PROTONIX) 40 mg tablet   No No   Sig: Take 1 tablet (40 mg total) by mouth daily   spironolactone (ALDACTONE) 50 mg tablet  Self Yes No   Sig: Take 50 mg by mouth 2 (two) times a day   thiamine (VITAMIN B1) 100 mg tablet   Yes No   Sig: Take 100 mg by mouth daily      Facility-Administered Medications: None       Past Medical History:   Diagnosis Date    Anemia     Anxiety     Ascites     Cervical cancer (HCC)     Chronic pain     Cirrhosis (HCC)     CVA (cerebral vascular accident) (HCC)     Depression     Fibromyalgia     Hepatic encephalopathy (HCC)     Opioid dependence in remission (HCC) 11/7/2022       Past Surgical History:   Procedure Laterality Date    COLONOSCOPY  07/14/2015    COLONOSCOPY  03/15/2013    Hemorrhoids    EGD  12/23/2014    HYSTERECTOMY      MT AMPUTATION TOE INTERPHALANGEAL JOINT Right 6/7/2024    Procedure: AMPUTATION RIGHT GREAT TOE;  Surgeon: Niru Baldwin DPM;  Location:  MAIN OR;  Service: Podiatry       Family History   Problem Relation Age of Onset    Lung cancer Mother     Cirrhosis Father     Cancer Sister     Colon polyps Neg Hx     Colon cancer Neg Hx      I have reviewed and agree with the history as documented.    E-Cigarette/Vaping    E-Cigarette Use Never User       E-Cigarette/Vaping Substances    Nicotine No     THC No     CBD No     Flavoring No     Other No     Unknown No      Social History     Tobacco Use    Smoking status: Every Day     Passive exposure: Current    Smokeless tobacco: Never   Vaping Use    Vaping status: Never Used   Substance Use Topics    Alcohol use: Not Currently     Comment: Had been drinking anywhere from 3-5 drinks of hard alcohol daily for the past 2 years, quit 8/17       Review of Systems    Physical Exam  Physical Exam  Vitals and nursing note reviewed.   HENT:      Head: Normocephalic and atraumatic.   Cardiovascular:      Rate and Rhythm: Normal rate and regular rhythm.      Pulses: Normal pulses.   Pulmonary:      Effort: Pulmonary effort is normal. No respiratory distress.   Musculoskeletal:      Right lower leg: Edema (1+) present.      Left lower leg: Left lower leg edema: cam boot.   Skin:     General: Skin is warm and dry.   Neurological:      Mental Status: She is alert.         Vital Signs  ED Triage Vitals [09/01/24 1902]   Temperature Pulse Respirations Blood Pressure SpO2   98 °F (36.7 °C) 65 18 134/63 98 %      Temp Source Heart Rate Source Patient Position - Orthostatic VS BP Location FiO2 (%)   Temporal Monitor Lying Right arm --      Pain Score       No Pain           Vitals:    09/01/24 1902   BP: 134/63   Pulse: 65   Patient Position - Orthostatic VS: Lying         Visual Acuity      ED Medications  Medications   cefepime (MAXIPIME) IVPB (premix in dextrose) 2,000 mg 50 mL (2,000 mg Intravenous New Bag 9/1/24 2058)   vancomycin (VANCOCIN) 1500 mg in sodium chloride 0.9% 250 mL IVPB (has no administration in time range)       Diagnostic Studies  Results Reviewed       Procedure Component Value Units Date/Time    Procalcitonin [[id number]]  (Normal) Collected: 09/01/24 2011    Lab Status: Final result Specimen: Blood from Arm, Right Updated: 09/01/24 2049     Procalcitonin <0.05 ng/ml     Comprehensive metabolic panel  [[id number]]  (Abnormal) Collected: 09/01/24 2011    Lab Status: Final result Specimen: Blood from Arm, Right Updated: 09/01/24 2038     Sodium 138 mmol/L      Potassium 4.4 mmol/L      Chloride 99 mmol/L      CO2 35 mmol/L      ANION GAP 4 mmol/L      BUN 12 mg/dL      Creatinine 0.93 mg/dL      Glucose 95 mg/dL      Calcium 10.0 mg/dL      Corrected Calcium 10.6 mg/dL      AST 38 U/L      ALT 17 U/L      Alkaline Phosphatase 121 U/L      Total Protein 6.1 g/dL      Albumin 3.3 g/dL      Total Bilirubin 1.04 mg/dL      eGFR 62 ml/min/1.73sq m     Narrative:      National Kid[employer] guidelines for Chronic Kidney Disease (CKD):     Stage 1 with normal or high GFR (GFR > 90 mL/min/1.73 square meters)    Stage 2 Mild CKD (GFR = 60-89 mL/min/1.73 square meters)    Stage 3A Moderate CKD (GFR = 45-59 mL/min/1.73 square meters)    Stage 3B Moderate CKD (GFR = 30-44 mL/min/1.73 square meters)    Stage 4 Severe CKD (GFR = 15-29 mL/min/1.73 square meters)    Stage 5 End Stage CKD (GFR <15 mL/min/1.73 square meters)  Note: GFR calculation is accurate only with a steady state creatinine    C-reactive protein [[id number]]  (Normal) Collected: 09/01/24 2011    Lab Status: Final result Specimen: Blood from Arm, Right Updated: 09/01/24 2038     CRP 2.3 mg/L     Lactic acid [[id number]]  (Normal) Collected: 09/01/24 2011    Lab Status: Final result Specimen: Blood from Arm, Right Updated: 09/01/24 2038     LACTIC ACID 1.2 mmol/L     Narrative:      Result may be elevated if tourniquet was used during collection.    Protime-INR [[id number]]  (Abnormal) Collected: 09/01/24 2011    Lab Status: Final result Specimen: Blood from Arm, Right Updated: 09/01/24 2035     Protime 16.2 seconds      INR 1.25    Narrative:      INR Therapeutic Range    Indication                                             INR Range      Atrial Fibrillation                                               2.0-3.0  Hypercoagulable State                                     2.0.2.3  Left Ventricular Asist Device                            2.0-3.0  Mechanical Heart Valve                                  -    Aortic(with afib, MI, embolism, HF, LA enlargement,    and/or coagulopathy)                                     2.0-3.0 (2.5-3.5)     Mitral                                                             2.5-3.5  Prosthetic/Bioprosthetic Heart Valve               2.0-3.0  Venous thromboembolism (VTE: VT, PE        2.0-3.0    APTT [[id number]]  (Normal) Collected: 09/01/24 2011    Lab Status: Final result Specimen: Blood from Arm, Right Updated: 09/01/24 2035     PTT 30 seconds     Sedimentation rate, automated [[id number]]  (Normal) Collected: 09/01/24 2011    Lab Status: Final result Specimen: Blood from Arm, Right Updated: 09/01/24 2033     Sed Rate 1 mm/hour     CBC and differential [[id number]]  (Abnormal) Collected: 09/01/24 2011    Lab Status: Final result Specimen: Blood from Arm, Right Updated: 09/01/24 2026     WBC 4.14 Thousand/uL      RBC 4.05 Million/uL      Hemoglobin 13.4 g/dL      Hematocrit 39.5 %      MCV 98 fL      MCH 33.1 pg      MCHC 33.9 g/dL      RDW 12.9 %      MPV 10.4 fL      Platelets 118 Thousands/uL      nRBC 0 /100 WBCs      Segmented % 42 %      Immature Grans % 1 %      Lymphocytes % 41 %      Monocytes % 11 %      Eosinophils Relative 4 %      Basophils Relative 1 %      Absolute Neutrophils 1.75 Thousands/µL      Absolute Immature Grans 0.02 Thousand/uL      Absolute Lymphocytes 1.71 Thousands/µL      Absolute Monocytes 0.47 Thousand/µL      Eosinophils Absolute 0.16 Thousand/µL      Basophils Absolute 0.03 Thousands/µL                    XR foot 3+ views RIGHT   ED Interpretation by Charlene Osei MD (09/01 2005)   Concern for osteomyelitis of the right second toe                 Procedures  ECG 12 Lead Documentation Only    Date/Time: 9/1/2024 8:37 PM    Performed by: Charlene Osei MD  Authorized by: Charlene Beverly  MD Farnaz    Indications / Diagnosis:  Pre op  Patient location:  ED  Previous ECG:     Previous ECG:  Unavailable  Interpretation:     Interpretation: normal    Rate:     ECG rate:  71    ECG rate assessment: normal    Rhythm:     Rhythm: sinus rhythm    Ectopy:     Ectopy: none    QRS:     QRS axis:  Normal    QRS intervals:  Normal  Conduction:     Conduction: normal    ST segments:     ST segments:  Normal  T waves:     T waves: normal             ED Course                                               Medical Decision Making  68 year old female presents for evaluation of suspected right second toe osteomyelitis.  No current SIRS criteria.  Labs unremarkable.  Xray consistent with second toe osteomyelitis.  Patient started on cefepime and vancomycin and admitted for further evaluation and management.    Amount and/or Complexity of Data Reviewed  Labs: ordered.  Radiology: ordered and independent interpretation performed.    Risk  Prescription drug management.  Decision regarding hospitalization.                 Disposition  Final diagnoses:   Osteomyelitis of second toe of right foot (HCC)     Time reflects when diagnosis was documented in both MDM as applicable and the Disposition within this note       Time User Action Codes Description Comment    9/1/2024  8:13 PM Charlene Osei [M86.9] Osteomyelitis of second toe of right foot (HCC)           ED Disposition       ED Disposition   Admit    Condition   Stable    Date/Time   Sun Sep 1, 2024  8:13 PM    Comment   Case was discussed with MERCEDEZ and the patient's admission status was agreed to be Admission Status: inpatient status to the service of Dr. Kowalski .               Follow-up Information    None         Current Discharge Medication List        CONTINUE these medications which have NOT CHANGED    Details   albuterol (PROVENTIL HFA,VENTOLIN HFA) 90 mcg/act inhaler INHALE 2 PUFFS EVERY 6 HOURS AS NEEDED FOR SHORTNESS OF BREATH OR WHEEZING       ALPRAZolam (XANAX) 0.5 mg tablet Take 2 tablets of Xanax per day as needed for severe anxiety.  For particularly severe anxiety you may take a maximum of 3 tablets.  Do not regularly use more than 2 tablets/day.  Qty: 66 tablet, Refills: 5    Comments: 6 extra tablets included per month for a total of 66 tablets for 30 days for  when patient needs to take 3 xanax (approximately 6 days per month) for particularly difficult days  Associated Diagnoses: Generalized anxiety disorder      carbamide peroxide (DEBROX) 6.5 % otic solution Administer 5 drops into both ears 2 (two) times a day  Qty: 15 mL, Refills: 0    Associated Diagnoses: Altered mental status      fluticasone-vilanterol (BREO ELLIPTA) 100-25 mcg/inh inhaler Inhale 1 puff daily Rinse mouth after use.      folic acid (FOLVITE) 1 mg tablet Take by mouth daily      furosemide (LASIX) 20 mg tablet Take 20 mg daily alternating with 40 mg daily.  Qty: 60 tablet, Refills: 1    Associated Diagnoses: Alcoholic cirrhosis of liver with ascites (HCC)      gabapentin (NEURONTIN) 300 mg capsule Take 1 capsule (300 mg total) by mouth 2 (two) times a day  Qty: 60 capsule, Refills: 0    Associated Diagnoses: Osteomyelitis of great toe of right foot (HCC)      ipratropium (ATROVENT HFA) 17 mcg/act inhaler Inhale 2 puffs 4 (four) times a day as needed for wheezing  Qty: 12.9 g, Refills: 0    Associated Diagnoses: Altered mental status      Ipratropium-Albuterol (COMBIVENT IN) Inhale 2 (two) times a day      lactulose (CHRONULAC) 10 g/15 mL solution Take 30 mL (20 g total) by mouth 3 (three) times a day  Qty: 240 mL, Refills: 0    Associated Diagnoses: Altered mental status      magnesium hydroxide (MILK OF MAGNESIA) 400 mg/5 mL oral suspension Take by mouth daily as needed for constipation      meclizine (ANTIVERT) 12.5 MG tablet Take 1 tablet (12.5 mg total) by mouth every 8 (eight) hours as needed for dizziness  Qty: 30 tablet, Refills: 0    Associated Diagnoses: Altered  mental status      Melatonin 10 MG TABS Take by mouth      methocarbamol (ROBAXIN) 500 mg tablet       metoprolol succinate (TOPROL-XL) 25 mg 24 hr tablet Take 12.5 mg by mouth daily      nicotine (NICODERM CQ) 14 mg/24hr TD 24 hr patch Place 1 patch on the skin over 24 hours daily  Qty: 28 patch, Refills: 0    Associated Diagnoses: Altered mental status      pantoprazole (PROTONIX) 40 mg tablet Take 1 tablet (40 mg total) by mouth daily  Qty: 30 tablet, Refills: 2    Associated Diagnoses: Alcoholic cirrhosis of liver with ascites (HCC)      spironolactone (ALDACTONE) 50 mg tablet Take 50 mg by mouth 2 (two) times a day      thiamine (VITAMIN B1) 100 mg tablet Take 100 mg by mouth daily             No discharge procedures on file.    PDMP Review         Value Time User    PDMP Reviewed  Yes 7/30/2024  3:44 PM Ana Paula Cadena DO            ED Provider  Electronically Signed by             Charlene Osei MD  09/01/24 3189

## 2024-09-02 NOTE — ASSESSMENT & PLAN NOTE
Plt 118  Baseline about    Thrombocytopenia secondary to cirrhosis   Continue to monitor   Recent Labs     09/01/24 2011 09/02/24  0503   * 84*

## 2024-09-02 NOTE — PROGRESS NOTES
[facility]  Progress Note  Name: Finn Oleary I  MRN: [id number]  Unit/Bed#: MS 220Any I Date of Admission: 9/1/2024   Date of Service: 9/2/2024  Hospital Day: 1    Assessment & Plan   COPD (chronic obstructive pulmonary disease) (HCC)  Assessment & Plan  Continue home inhalers  Does not appear in COPD exacerbation at this time    Thrombocytopenia (HCC)  Assessment & Plan  Plt 118  Baseline about    Thrombocytopenia secondary to cirrhosis   Continue to monitor   Recent Labs     09/01/24 2011 09/02/24  0503   * 84*        Tobacco use disorder  Assessment & Plan  Encouraged cessation    Alcoholic cirrhosis (HCC)  Assessment & Plan  History of alcoholic cirrhosis complicated by ascites, esophageal varices and hepatic encephalopathy in the past  Home regimen: encouraged patient to use medications as they were prescribed  Lactulose tid   Only using daily   Lasix 20 mg alternating with 40 mg   Patient only using prn   Ordered 20 mg daily while inpatient   Spironolactone 50 mg bid   Patient only using daily  MELD-Na: 11     * Osteomyelitis of second toe of right foot (HCC)  Assessment & Plan  Known osteomyelitis of second toe of right foot. Follows with Dr Baldwin outpatient. Seen on 8/30 and encouraged to come to the hospital for abx and amputation.   Not meeting SIRS   CRP WNL  Plan:   MRI foot-completed, final reading pending  IV abx (Cef + Vanc)  Plan for surgery tentatively Tuesday.  Patient unable to care for self post surgery. Consult CM  Podiatry following, appreciate recommendations               VTE Pharmacologic Prophylaxis: VTE Score: 3 Moderate Risk (Score 3-4) - Pharmacological DVT Prophylaxis Ordered: heparin.    Mobility:   Basic Mobility Inpatient Raw Score: 20  JH-HLM Goal: 6: Walk 10 steps or more  JH-HLM Achieved: 7: Walk 25 feet or more  JH-HLM Goal achieved. Continue to encourage appropriate mobility.    Patient Centered Rounds: I performed bedside rounds  with nursing staff today.   Discussions with Specialists or Other Care Team Provider: yes    Education and Discussions with Family / Patient:  yes.     Total Time Spent on Date of Encounter in care of patient: 39 mins. This time was spent on one or more of the following: performing physical exam; counseling and coordination of care; obtaining or reviewing history; documenting in the medical record; reviewing/ordering tests, medications or procedures; communicating with other healthcare professionals and discussing with patient's family/caregivers.    Current Length of Stay: 1 day(s)  Current Patient Status: Inpatient   Certification Statement: The patient will continue to require additional inpatient hospital stay due to pending evaluation  Discharge Plan:  tbd     Code Status: Level 1 - Full Code    Subjective:   Seen and examined at bedside  Awake and alert  Complaining of fatigue and generalized weakness  Complaining of chronic pain  Vitals are stable    Objective:     Vitals:   Temp (24hrs), Av °F (36.1 °C), Min:96.4 °F (35.8 °C), Max:98 °F (36.7 °C)    Temp:  [96.4 °F (35.8 °C)-98 °F (36.7 °C)] 96.6 °F (35.9 °C)  HR:  [65-83] 72  Resp:  [16-18] 16  BP: (113-143)/(48-64) 113/48  SpO2:  [92 %-98 %] 92 %  Body mass index is 21.61 kg/m².     Input and Output Summary (last 24 hours):   No intake or output data in the 24 hours ending 24 1306    Physical Exam:   Physical Exam  Vitals reviewed.   Constitutional:       Comments: Malnourished and frail   HENT:      Head: Atraumatic.      Mouth/Throat:      Mouth: Mucous membranes are dry.   Cardiovascular:      Rate and Rhythm: Normal rate.      Heart sounds: Normal heart sounds.   Pulmonary:      Effort: No respiratory distress.   Abdominal:      General: Bowel sounds are normal.      Tenderness: There is no abdominal tenderness.   Musculoskeletal:         General: Tenderness present.   Skin:     General: Skin is dry.      Findings: Bruising and lesion  present.   Neurological:      Mental Status: She is alert. Mental status is at baseline.   Psychiatric:         Mood and Affect: Mood normal.          Additional Data:     Labs:  Results from last 7 days   Lab Units 09/02/24  0503   WBC Thousand/uL 3.19*   HEMOGLOBIN g/dL 12.4   HEMATOCRIT % 36.7   PLATELETS Thousands/uL 84*   SEGS PCT % 37*   LYMPHO PCT % 45*   MONO PCT % 12   EOS PCT % 5     Results from last 7 days   Lab Units 09/02/24  0503 09/01/24 2011   SODIUM mmol/L 137 138   POTASSIUM mmol/L 4.9 4.4   CHLORIDE mmol/L 104 99   CO2 mmol/L 32 35*   BUN mg/dL 15 12   CREATININE mg/dL 0.80 0.93   ANION GAP mmol/L 1* 4   CALCIUM mg/dL 9.4 10.0   ALBUMIN g/dL  --  3.3*   TOTAL BILIRUBIN mg/dL  --  1.04*   ALK PHOS U/L  --  121*   ALT U/L  --  17   AST U/L  --  38   GLUCOSE RANDOM mg/dL 113 95     Results from last 7 days   Lab Units 09/01/24 2011   INR  1.25*             Results from last 7 days   Lab Units 09/01/24 2011   LACTIC ACID mmol/L 1.2   PROCALCITONIN ng/ml <0.05       Lines/Drains:  Invasive Devices       Peripheral Intravenous Line  Duration             Peripheral IV 09/01/24 Dorsal (posterior);Right Forearm <1 day                          Imaging: Reviewed radiology reports from this admission including: MRI foot    Recent Cultures (last 7 days):         Last 24 Hours Medication List:   Current Facility-Administered Medications   Medication Dose Route Frequency Provider Last Rate    acetaminophen  650 mg Oral Q6H PRN Sania Ag PA-C      albuterol  2 puff Inhalation Q6H PRN Sania Ag PA-C      ALPRAZolam  0.5 mg Oral BID PRN Sania Ag PA-C      cefepime  2,000 mg Intravenous Q12H Sania Ag PA-C 2,000 mg (09/02/24 1114)    Fluticasone Furoate-Vilanterol  1 puff Inhalation Daily Sania Ag PA-C      folic acid  1 mg Oral Daily Sania Ag PA-C      gabapentin  300 mg Oral BID Sania Ag PA-C      heparin (porcine)  5,000 Units Subcutaneous Q8H SJ Hernandez DPM      lactulose  20 g  Oral TID Sania Ag PA-C      magnesium sulfate  2 g Intravenous Once Sania Ag PA-C 2 g (09/02/24 1114)    melatonin  6 mg Oral HS Sania Ag PA-C      metoprolol tartrate  12.5 mg Oral Daily Sania Ag PA-C      pantoprazole  40 mg Oral Daily Before Breakfast Sania Ag PA-C      spironolactone  50 mg Oral Daily Sania Ag PA-C      thiamine  100 mg Oral Daily Sania Ag PA-C      vancomycin  1,250 mg Intravenous Q24H Sania Ag PA-C          Today, Patient Was Seen By: Cristina Ford MD    **Please Note: This note may have been constructed using a voice recognition system.**

## 2024-09-02 NOTE — PROGRESS NOTES
Finn Oleary is a 68 y.o. female who is currently ordered Vancomycin IV with management by the Pharmacy Consult service.  Relevant clinical data and objective / subjective history reviewed.  Vancomycin Assessment:  Indication and Goal AUC/Trough: Bone/joint infection (goal -600, trough >10)  Clinical Status: stable  Micro:   N/A  Renal Function:  SCr: 0.8 mg/dL  CrCl: 54.9 mL/min  Renal replacement: Not on dialysis  Days of Therapy: 2  Current Dose: 1000mg every 24 hours  Vancomycin Plan: current dose predicts trough <10, thus dose changed as below.  New Dosinmg every 24 hours  Estimated AUC: 499 mcg*hr/mL  Estimated Trough: 11.4 mcg/mL  Next Level: 9/3/24 at 0600  Renal Function Monitoring: Daily BMP and UOP  Pharmacy will continue to follow closely for s/sx of nephrotoxicity, infusion reactions and appropriateness of therapy.  BMP and CBC will be ordered per protocol. We will continue to follow the patient’s culture results and clinical progress daily. Also, cefepime will be adjusted if necessary.    Jarod Alvarez, Pharmacist, PharmD, BCPS

## 2024-09-02 NOTE — ASSESSMENT & PLAN NOTE
Known osteomyelitis of second toe of right foot. Follows with Dr Baldwin outpatient. Seen on 8/30 and encouraged to come to the hospital for abx and amputation.   Not meeting SIRS   CRP WNL  Plan:   MRI foot-completed, final reading pending  IV abx (Cef + Vanc)  Plan for surgery tentatively Tuesday.  Patient unable to care for self post surgery. Consult CM  Podiatry following, appreciate recommendations

## 2024-09-02 NOTE — H&P
[facility]  H&P  Name: Finn Oleary 68 y.o. female I MRN: [id number]  Unit/Bed#: -[facility] I Date of Admission: 9/1/2024   Date of Service: 9/1/2024 I Hospital Day: 0      Assessment & Plan   * Osteomyelitis of second toe of right foot (HCC)  Assessment & Plan  Known osteomyelitis of second toe of right foot. Follows with Dr Baldwin outpatient. Seen on 8/30 and encouraged to come to the hospital for abx and amputation.   Not meeting SIRS   CRP WNL  Plan:   Obtain MRI   IV abx (Cef + Vanc)  Plan for surgery tentatively Tuesday.  Patient unable to care for self post surgery. Consult CM  Consult podiatry     COPD (chronic obstructive pulmonary disease) ([facility])  Assessment & Plan  Continue home inhalers  Does not appear in COPD exacerbation at this time    Thrombocytopenia ([facility])  Assessment & Plan  Plt 118  Baseline about    Thrombocytopenia secondary to cirrhosis   Continue to monitor     Tobacco use disorder  Assessment & Plan  Encouraged cessation    Alcoholic cirrhosis ([facility])  Assessment & Plan  History of alcoholic cirrhosis complicated by ascites, esophageal varices and hepatic encephalopathy in the past  Home regimen: encouraged patient to use medications as they were prescribed  Lactulose tid   Only using daily   Lasix 20 mg alternating with 40 mg   Patient only using prn   Ordered 20 mg daily while inpatient   Spironolactone 50 mg bid   Patient only using daily  MELD-Na: 11            VTE Pharmacologic Prophylaxis: VTE Score: 3 Moderate Risk (Score 3-4) - Pharmacological DVT Prophylaxis Ordered: heparin.  Code Status: Level 1 - Full Code   Discussion with family: Patient declined call to .     Anticipated Length of Stay: Patient will be admitted on an inpatient basis with an anticipated length of stay of greater than 2 midnights secondary to osteomyelitis.    Total Time Spent on Date of Encounter in care of patient: 50 mins. This time was spent on one or more of  the following: performing physical exam; counseling and coordination of care; obtaining or reviewing history; documenting in the medical record; reviewing/ordering tests, medications or procedures; communicating with other healthcare professionals and discussing with patient's family/caregivers.    Chief Complaint:     History of Present Illness:  Finn Oleary is a 68 y.o. female with a PMH of COPD, alcoholic cirrhosis, tobacco use disorder, thrombocytopenia who presents from home with her known osteomyelitis of second toe of right foot.  Follows with Dr. Nicolasa barboza.  Was seen on 8/30 and encouraged to come to the hospital for antibiotics and amputation.  She was informed that amputation would most likely occur on 9/2 and she should come to the hospital prior to this for surgical planning.  She is not meeting SIRS criteria.  Started on IV antibiotics.  Agreeable to plan.     Review of Systems:  Review of Systems   Constitutional:  Negative for fatigue and fever.   HENT:  Negative for sore throat.    Respiratory:  Negative for cough, chest tightness and shortness of breath.    Cardiovascular:  Negative for chest pain.   Gastrointestinal:  Negative for abdominal distention, abdominal pain, diarrhea, nausea and vomiting.   Genitourinary:  Negative for difficulty urinating.   Musculoskeletal:  Negative for arthralgias.   Skin:  Positive for color change and wound.   Neurological:  Negative for weakness and headaches.   Psychiatric/Behavioral:  Negative for agitation and behavioral problems.    All other systems reviewed and are negative.      Past Medical and Surgical History:   Past Medical History:   Diagnosis Date    Anemia     Anxiety     Ascites     Cervical cancer (HCC)     Chronic pain     Cirrhosis (HCC)     CVA (cerebral vascular accident) (HCC)     Depression     Fibromyalgia     Hepatic encephalopathy (HCC)     Opioid dependence in remission (HCC) 11/7/2022       Past Surgical History:   Procedure  Laterality Date    COLONOSCOPY  07/14/2015    COLONOSCOPY  03/15/2013    Hemorrhoids    EGD  12/23/2014    HYSTERECTOMY      VT AMPUTATION TOE INTERPHALANGEAL JOINT Right 6/7/2024    Procedure: AMPUTATION RIGHT GREAT TOE;  Surgeon: Niru Baldwin DPM;  Location:  MAIN OR;  Service: Podiatry       Meds/Allergies:  Prior to Admission medications    Medication Sig Start Date End Date Taking? Authorizing Provider   albuterol (PROVENTIL HFA,VENTOLIN HFA) 90 mcg/act inhaler INHALE 2 PUFFS EVERY 6 HOURS AS NEEDED FOR SHORTNESS OF BREATH OR WHEEZING 8/6/24   Historical Provider, MD   ALPRAZolam (XANAX) 0.5 mg tablet Take 2 tablets of Xanax per day as needed for severe anxiety.  For particularly severe anxiety you may take a maximum of 3 tablets.  Do not regularly use more than 2 tablets/day. 7/30/24   Ana Paula Cadena,    carbamide peroxide (DEBROX) 6.5 % otic solution Administer 5 drops into both ears 2 (two) times a day 6/10/24   Lilian Leyva PA-C   fluticasone-vilanterol (BREO ELLIPTA) 100-25 mcg/inh inhaler Inhale 1 puff daily Rinse mouth after use.    Historical Provider, MD   folic acid (FOLVITE) 1 mg tablet Take by mouth daily    Historical Provider, MD   furosemide (LASIX) 20 mg tablet Take 20 mg daily alternating with 40 mg daily. 10/7/19   DANISH Alston   gabapentin (NEURONTIN) 300 mg capsule Take 1 capsule (300 mg total) by mouth 2 (two) times a day 6/10/24   Lilian Leyva PA-C   ipratropium (ATROVENT HFA) 17 mcg/act inhaler Inhale 2 puffs 4 (four) times a day as needed for wheezing 6/10/24   Lilian Leyva PA-C   Ipratropium-Albuterol (COMBIVENT IN) Inhale 2 (two) times a day    Historical Provider, MD   lactulose (CHRONULAC) 10 g/15 mL solution Take 30 mL (20 g total) by mouth 3 (three) times a day 6/10/24   Lilian Leyva PA-C   magnesium hydroxide (MILK OF MAGNESIA) 400 mg/5 mL oral suspension Take by mouth daily as needed for constipation    Historical Provider, MD   meclizine (ANTIVERT) 12.5 MG tablet Take  1 tablet (12.5 mg total) by mouth every 8 (eight) hours as needed for dizziness 6/10/24   Lilian Leyva PA-C   Melatonin 10 MG TABS Take by mouth    Historical Provider, MD   methocarbamol (ROBAXIN) 500 mg tablet  7/4/24   Historical Provider, MD   metoprolol succinate (TOPROL-XL) 25 mg 24 hr tablet Take 12.5 mg by mouth daily    Historical Provider, MD   nicotine (NICODERM CQ) 14 mg/24hr TD 24 hr patch Place 1 patch on the skin over 24 hours daily  Patient not taking: Reported on 6/21/2024 6/11/24   Lilian Leyva PA-C   pantoprazole (PROTONIX) 40 mg tablet Take 1 tablet (40 mg total) by mouth daily 9/13/19   DANISH Alston   spironolactone (ALDACTONE) 50 mg tablet Take 50 mg by mouth 2 (two) times a day    Historical Provider, MD   thiamine (VITAMIN B1) 100 mg tablet Take 100 mg by mouth daily    Historical Provider, MD     I have reviewed home medications with patient personally.    Allergies:   Allergies   Allergen Reactions    Gadolinium     Ibuprofen Other (See Comments)     increased BP    Nsaids     Other Palpitations     All anti depressants       Social History:  Marital Status:    Occupation: Known  Patient Pre-hospital Living Situation: Home  Patient Pre-hospital Level of Mobility: walks  Patient Pre-hospital Diet Restrictions: Regular  Substance Use History:   Social History     Substance and Sexual Activity   Alcohol Use Not Currently    Comment: Had been drinking anywhere from 3-5 drinks of hard alcohol daily for the past 2 years, quit 8/17     Social History     Tobacco Use   Smoking Status Every Day    Passive exposure: Current   Smokeless Tobacco Never     Social History     Substance and Sexual Activity   Drug Use Not on file       Family History:  Family History   Problem Relation Age of Onset    Lung cancer Mother     Cirrhosis Father     Cancer Sister     Colon polyps Neg Hx     Colon cancer Neg Hx        Physical Exam:     Vitals:   Blood Pressure: 143/61 (09/01/24 2224)  Pulse: 83  "(09/01/24 2224)  Temperature: (!) 96.4 °F (35.8 °C) (09/01/24 2224)  Temp Source: Temporal (09/01/24 1902)  Respirations: 16 (09/01/24 2224)  Height: 5' 3\" (160 cm) (09/01/24 1902)  Weight - Scale: 55.3 kg (122 lb) (09/01/24 1902)  SpO2: 94 % (09/01/24 2224)    Physical Exam  Vitals and nursing note reviewed.   Constitutional:       Appearance: Normal appearance.   HENT:      Head: Normocephalic.   Eyes:      Extraocular Movements: Extraocular movements intact.      Pupils: Pupils are equal, round, and reactive to light.   Cardiovascular:      Rate and Rhythm: Normal rate and regular rhythm.      Heart sounds: No murmur heard.  Pulmonary:      Effort: Pulmonary effort is normal. No respiratory distress.      Breath sounds: Normal breath sounds. No wheezing.   Abdominal:      General: Bowel sounds are normal. There is no distension.      Tenderness: There is no abdominal tenderness. There is no guarding.   Musculoskeletal:         General: Normal range of motion.      Cervical back: Normal range of motion.      Right lower leg: Edema present.      Left lower leg: Edema present.   Skin:     General: Skin is warm.      Findings: Lesion (second toe on right foot) present.   Neurological:      General: No focal deficit present.      Mental Status: She is alert and oriented to person, place, and time.   Psychiatric:         Mood and Affect: Mood normal.         Behavior: Behavior normal.         Thought Content: Thought content normal.          Additional Data:     Lab Results:  Results from last 7 days   Lab Units 09/01/24 2011   WBC Thousand/uL 4.14*   HEMOGLOBIN g/dL 13.4   HEMATOCRIT % 39.5   PLATELETS Thousands/uL 118*   SEGS PCT % 42*   LYMPHO PCT % 41   MONO PCT % 11   EOS PCT % 4     Results from last 7 days   Lab Units 09/01/24 2011   SODIUM mmol/L 138   POTASSIUM mmol/L 4.4   CHLORIDE mmol/L 99   CO2 mmol/L 35*   BUN mg/dL 12   CREATININE mg/dL 0.93   ANION GAP mmol/L 4   CALCIUM mg/dL 10.0   ALBUMIN g/dL 3.3* " "  TOTAL BILIRUBIN mg/dL 1.04*   ALK PHOS U/L 121*   ALT U/L 17   AST U/L 38   GLUCOSE RANDOM mg/dL 95     Results from last 7 days   Lab Units 09/01/24 2011   INR  1.25*         No results found for: \"HGBA1C\"  Results from last 7 days   Lab Units 09/01/24 2011   LACTIC ACID mmol/L 1.2   PROCALCITONIN ng/ml <0.05       Lines/Drains:  Invasive Devices       Peripheral Intravenous Line  Duration             Peripheral IV 09/01/24 Dorsal (posterior);Right Forearm <1 day                        Imaging: Reviewed radiology reports from this admission including: xray(s)  XR foot 3+ views RIGHT   ED Interpretation by Charlene Osei MD (09/01 2005)   Concern for osteomyelitis of the right second toe      MRI inpatient order    (Results Pending)       EKG and Other Studies Reviewed on Admission:   EKG: NSR. HR 71.    ** Please Note: This note has been constructed using a voice recognition system. **    "

## 2024-09-02 NOTE — PROGRESS NOTES
Finn Oleary is a 68 y.o. female who is currently ordered Vancomycin IV with management by the Pharmacy Consult service.  Relevant clinical data and objective / subjective history reviewed.  Vancomycin Assessment:  Indication and Goal AUC/Trough: Bone/joint infection (goal -600, trough >10)  Clinical Status:  New  Micro:   Pending  Renal Function:  SCr: 0.93 mg/dL  CrCl: 47.2 mL/min  Renal replacement: Not on dialysis  Days of Therapy: 1  Current Dose:  1500mg once loading dose  Vancomycin Plan:  New Dosinmg every 24 hours  Estimated AUC: 428 mcg*hr/mL  Estimated Trough: 10.4 mcg/mL  Next Level: 9/3/24 at 0600  Renal Function Monitoring: Daily BMP and UOP  Pharmacy will continue to follow closely for s/sx of nephrotoxicity, infusion reactions and appropriateness of therapy.  BMP and CBC will be ordered per protocol. We will continue to follow the patient’s culture results and clinical progress daily. Also, cefepime will be adjusted if necessary.    Jarod Alvarez, Pharmacist, PharmD, BCPS

## 2024-09-02 NOTE — CONSULTS
Consult - Podiatry   Finn Oleary 68 y.o. female MRN: [id number]  Unit/Bed#: -01 Encounter: [id number]    Assessment & Plan     Osteomyelitis right second toe  Skin ulceration right second toe with necrosis of bone  Plain film x-rays from 9/1/2024 suspicious for osteomyelitis distal phalanx  MRI right foot completed 9/2/2024: Subtle increased uptake on T2, await final radiology interpretation though I believe this is suggestive of osteomyelitis  Plan for amputation right second toe tomorrow afternoon with Dr. Nicolasa CLAYTON 12 midnight  Heparin on hold after 10 PM, case request submitted for tomorrow afternoon.    COPD, thrombocytopenia, tobacco use disorder, and alcoholic cirrhosis  Managed per internal medicine    History of Present Illness     HPI:  Finn Oleary is a 68 y.o. female who presents with osteomyelitis of her right second toe.    Podiatry consult requested to evaluate open wound/infection.  Patient has prior history of right great toe amputation for osteomyelitis.  Patient's chart reviewed noting all pertinent clinical laboratory data.    Inpatient consult to Podiatry  Consult performed by: Prakash Hernandez DPM  Consult ordered by: Sania Ag PA-C        Review of Systems   Constitutional: Negative.    HENT: Negative.    Eyes: Negative.    Respiratory: Negative.    Cardiovascular: Negative denies any shortness of breath  Gastrointestinal: Negative.    Musculoskeletal: Limited ambulation  Skin: Chronic ulcer tip of right second toe  Neurological: Negative      Historical Information   Past Medical History:   Diagnosis Date    Anemia     Anxiety     Ascites     Cervical cancer (HCC)     Chronic pain     Cirrhosis (HCC)     CVA (cerebral vascular accident) (HCC)     Depression     Fibromyalgia     Hepatic encephalopathy (HCC)     Opioid dependence in remission (HCC) 11/7/2022     Past Surgical History:   Procedure Laterality Date    COLONOSCOPY  07/14/2015    COLONOSCOPY  03/15/2013    Hemorrhoids     EGD  12/23/2014    HYSTERECTOMY      CA AMPUTATION TOE INTERPHALANGEAL JOINT Right 6/7/2024    Procedure: AMPUTATION RIGHT GREAT TOE;  Surgeon: Niru Baldwin DPM;  Location:  MAIN OR;  Service: Podiatry     Social History   Social History     Substance and Sexual Activity   Alcohol Use Not Currently    Comment: Had been drinking anywhere from 3-5 drinks of hard alcohol daily for the past 2 years, quit 8/17     Social History     Substance and Sexual Activity   Drug Use Not on file     Social History     Tobacco Use   Smoking Status Every Day    Passive exposure: Current   Smokeless Tobacco Never     Family History:   Family History   Problem Relation Age of Onset    Lung cancer Mother     Cirrhosis Father     Cancer Sister     Colon polyps Neg Hx     Colon cancer Neg Hx        Meds/Allergies     Medications Prior to Admission:     furosemide (LASIX) 20 mg tablet    metoprolol tartrate (LOPRESSOR) 25 mg tablet    albuterol (PROVENTIL HFA,VENTOLIN HFA) 90 mcg/act inhaler    ALPRAZolam (XANAX) 0.5 mg tablet    carbamide peroxide (DEBROX) 6.5 % otic solution    fluticasone-vilanterol (BREO ELLIPTA) 100-25 mcg/inh inhaler    folic acid (FOLVITE) 1 mg tablet    gabapentin (NEURONTIN) 300 mg capsule    ipratropium (ATROVENT HFA) 17 mcg/act inhaler    Ipratropium-Albuterol (COMBIVENT IN)    lactulose (CHRONULAC) 10 g/15 mL solution    magnesium hydroxide (MILK OF MAGNESIA) 400 mg/5 mL oral suspension    meclizine (ANTIVERT) 12.5 MG tablet    Melatonin 10 MG TABS    methocarbamol (ROBAXIN) 500 mg tablet    nicotine (NICODERM CQ) 14 mg/24hr TD 24 hr patch    pantoprazole (PROTONIX) 40 mg tablet    spironolactone (ALDACTONE) 50 mg tablet    thiamine (VITAMIN B1) 100 mg tablet  Allergies   Allergen Reactions    Gadolinium     Ibuprofen Other (See Comments)     increased BP    Nsaids     Other Palpitations     All anti depressants       Objective   First Vitals:   Blood Pressure: 134/63 (09/01/24 1902)  Pulse: 65  "(09/01/24 1902)  Temperature: 98 °F (36.7 °C) (09/01/24 1902)  Temp Source: Temporal (09/01/24 1902)  Respirations: 18 (09/01/24 1902)  Height: 5' 3\" (160 cm) (09/01/24 1902)  Weight - Scale: 55.3 kg (122 lb) (09/01/24 1902)  SpO2: 98 % (09/01/24 1902)    Current Vitals:   Blood Pressure: (!) 113/48 (09/02/24 0759)  Pulse: 72 (09/02/24 0759)  Temperature: (!) 96.6 °F (35.9 °C) (09/02/24 0759)  Temp Source: Temporal (09/02/24 0759)  Respirations: 16 (09/01/24 2224)  Height: 5' 3\" (160 cm) (09/01/24 1902)  Weight - Scale: 55.3 kg (122 lb) (09/01/24 1902)  SpO2: 92 % (09/02/24 0759)        BP (!) 113/48 (BP Location: Left arm)   Pulse 72   Temp (!) 96.6 °F (35.9 °C) (Temporal)   Resp 16   Ht 5' 3\" (1.6 m)   Wt 55.3 kg (122 lb)   LMP  (LMP Unknown)   SpO2 92%   BMI 21.61 kg/m²     General Appearance:    Alert, cooperative, no distress, resting comfortably at bedside in recliner.   Head:    Normocephalic, without obvious abnormality, atraumatic   Eyes:    PERRL, conjunctiva/corneas clear, EOM's intact            Nose:   Moist mucous membranes, no drainage or sinus tenderness   Throat:   No tenderness, no exudates   Neck:   Supple, symmetrical, trachea midline, no JVD   Back:     Symmetric, no CVA tenderness   Lungs:     Respirations unlabored   Chest wall:    No tenderness or deformity   Heart:    Rate and rhythm noted on last vitals.    Abdomen:     Soft, non-tender, bowel sounds active all four quadrants,     no masses, no organomegaly       Lower Ext/Ortho: Status post right great toe amputation.  History of ankle sprain with possible small avulsion fracture left fibula which patient wears cam boot.  No pain with palpation of distal end of fibula.     Neuro/Vasc: CNII-XII intact. Normal strength  Sensation and reflexes: Grossly intact  Pulses: Right: DP 1/4, PT 0/4, Left: DP 1/4, PT 0/4  Capillary Filling: 3 Sec, Edema: +1 bilateral     Skin: Texture, Tone and Turgor: Diminished, Digital Hair: None  Atrophic " "Changes: Mild  Lesions: None  Nail Pathology: Multiple dystrophic consistent with mycosis.  Ulcers/Wounds: Full-thickness ulcerative breakdown distal tip of right second toe with necrosis to bone.  Edema with erythema and subtle calor noted.                 Lab Results:   CBC w/diff  Results from last 7 days   Lab Units 09/02/24  0503   WBC Thousand/uL 3.19*   HEMOGLOBIN g/dL 12.4   HEMATOCRIT % 36.7   PLATELETS Thousands/uL 84*   SEGS PCT % 37*   LYMPHO PCT % 45*   MONO PCT % 12   EOS PCT % 5     BMP  Results from last 7 days   Lab Units 09/02/24  0503   POTASSIUM mmol/L 4.9   CHLORIDE mmol/L 104   CO2 mmol/L 32   BUN mg/dL 15   CREATININE mg/dL 0.80   CALCIUM mg/dL 9.4     CMP  Results from last 7 days   Lab Units 09/02/24 0503 09/01/24 2011   POTASSIUM mmol/L 4.9 4.4   CHLORIDE mmol/L 104 99   CO2 mmol/L 32 35*   BUN mg/dL 15 12   CREATININE mg/dL 0.80 0.93   CALCIUM mg/dL 9.4 10.0   ALK PHOS U/L  --  121*   ALT U/L  --  17   AST U/L  --  38     @Culture@  Lab Results   Component Value Date    BLOODCX No Growth After 5 Days. 07/15/2024    BLOODCX No Growth After 5 Days. 07/15/2024    BLOODCX No Growth After 5 Days. 06/07/2024    BLOODCX No Growth After 5 Days. 06/07/2024    BLOODCX Staphylococcus coagulase negative (A) 06/05/2024    BLOODCX No Growth After 5 Days. 06/05/2024     No results found for: \"WOUNDCULT\"      Imaging: I have personally reviewed pertinent films in PACS      EKG, Pathology, and Other Studies: I have personally reviewed pertinent reports.      Code Status: Level 1 - Full Code  Advance Directive and Living Will:      Power of :      Please Note: Voice to text dictation software was used in the creation of this document.       Prakash Hernandez DPM      "

## 2024-09-02 NOTE — MALNUTRITION/BMI
This medical record reflects one or more clinical indicators suggestive of malnutrition and/or morbid obesity.    Malnutrition Findings:   Adult Malnutrition type: Chronic illness  Adult Degree of Malnutrition: Malnutrition of moderate degree  Malnutrition Characteristics: Fat loss, Muscle loss                  360 Statement: Pt presents with moderate protein calorie malnutrition as evidneced by someheat hollowed orbitals, prominent clavicle bone and b/l temporal depressions. Treat with diet and supplements BID.    BMI Findings:           Body mass index is 21.61 kg/m².     See Nutrition note dated 9/2/24  for additional details.  Completed nutrition assessment is viewable in the nutrition documentation.

## 2024-09-02 NOTE — PLAN OF CARE
Problem: PAIN - ADULT  Goal: Verbalizes/displays adequate comfort level or baseline comfort level  Description: Interventions:  - Encourage patient to monitor pain and request assistance  - Assess pain using appropriate pain scale  - Administer analgesics based on type and severity of pain and evaluate response  - Implement non-pharmacological measures as appropriate and evaluate response  - Consider cultural and social influences on pain and pain management  - Notify physician/advanced practitioner if interventions unsuccessful or patient reports new pain  9/2/2024 1355 by Ias Montanez RN  Outcome: Progressing  9/2/2024 0732 by Isa Montanez RN  Outcome: Progressing     Problem: INFECTION - ADULT  Goal: Absence or prevention of progression during hospitalization  Description: INTERVENTIONS:  - Assess and monitor for signs and symptoms of infection  - Monitor lab/diagnostic results  - Monitor all insertion sites, i.e. indwelling lines, tubes, and drains  - Monitor endotracheal if appropriate and nasal secretions for changes in amount and color  - [place] appropriate cooling/warming therapies per order  - Administer medications as ordered  - Instruct and encourage patient and family to use good hand hygiene technique  - Identify and instruct in appropriate isolation precautions for identified infection/condition  9/2/2024 1355 by Isa Montanez RN  Outcome: Progressing  9/2/2024 0732 by Isa Montanez RN  Outcome: Progressing  Goal: Absence of fever/infection during neutropenic period  Description: INTERVENTIONS:  - Monitor WBC    9/2/2024 1355 by Isa Montanez RN  Outcome: Progressing  9/2/2024 0732 by Isa Montanez RN  Outcome: Progressing     Problem: SAFETY ADULT  Goal: Patient will remain free of falls  Description: INTERVENTIONS:  - Educate patient/family on patient safety including physical limitations  - Instruct patient to call for assistance with activity   - Consult OT/PT to assist  with strengthening/mobility   - Keep Call bell within reach  - Keep bed low and locked with side rails adjusted as appropriate  - Keep care items and personal belongings within reach  - Initiate and maintain comfort rounds  - Make Fall Risk Sign visible to staff  - Offer Toileting every x Hours, in advance of need  - Initiate/Maintain xalarm  - Obtain necessary fall risk management equipment: x  - Apply yellow socks and bracelet for high fall risk patients  - Consider moving patient to room near nurses station  9/2/2024 1355 by Isa Montanez RN  Outcome: Progressing  9/2/2024 0732 by Isa Montanez RN  Outcome: Progressing  Goal: Maintain or return to baseline ADL function  Description: INTERVENTIONS:  -  Assess patient's ability to carry out ADLs; assess patient's baseline for ADL function and identify physical deficits which impact ability to perform ADLs (bathing, care of mouth/teeth, toileting, grooming, dressing, etc.)  - Assess/evaluate cause of self-care deficits   - Assess range of motion  - Assess patient's mobility; develop plan if impaired  - Assess patient's need for assistive devices and provide as appropriate  - Encourage maximum independence but intervene and supervise when necessary  - Involve family in performance of ADLs  - Assess for home care needs following discharge   - Consider OT consult to assist with ADL evaluation and planning for discharge  - Provide patient education as appropriate  9/2/2024 1355 by Isa Montanez RN  Outcome: Progressing  9/2/2024 0732 by Isa Montanez RN  Outcome: Progressing  Goal: Maintains/Returns to pre admission functional level  Description: INTERVENTIONS:  - Perform AM-PAC 6 Click Basic Mobility/ Daily Activity assessment daily.  - Set and communicate daily mobility goal to care team and patient/family/caregiver.   - Collaborate with rehabilitation services on mobility goals if consulted  - Perform Range of Motion x times a day.  - Reposition patient  every x hours.  - Dangle patient x times a day  - Stand patient x times a day  - Ambulate patient x times a day  - Out of bed to chair x times a day   - Out of bed for meals xxx times a day  - Out of bed for toileting  - Record patient progress and toleration of activity level   9/2/2024 1355 by Isa Montanez RN  Outcome: Progressing  9/2/2024 0732 by Isa Montanez RN  Outcome: Progressing     Problem: DISCHARGE PLANNING  Goal: Discharge to home or other facility with appropriate resources  Description: INTERVENTIONS:  - Identify barriers to discharge w/patient and caregiver  - Arrange for needed discharge resources and transportation as appropriate  - Identify discharge learning needs (meds, wound care, etc.)  - Arrange for interpretive services to assist at discharge as needed  - Refer to Case Management Department for coordinating discharge planning if the patient needs post-hospital services based on physician/advanced practitioner order or complex needs related to functional status, cognitive ability, or social support system  9/2/2024 1355 by Isa Montanez RN  Outcome: Progressing  9/2/2024 0732 by Isa Montanez RN  Outcome: Progressing     Problem: Knowledge Deficit  Goal: Patient/family/caregiver demonstrates understanding of disease process, treatment plan, medications, and discharge instructions  Description: Complete learning assessment and assess knowledge base.  Interventions:  - Provide teaching at level of understanding  - Provide teaching via preferred learning methods  9/2/2024 1355 by Isa Montanez RN  Outcome: Progressing  9/2/2024 0732 by Isa Montanez RN  Outcome: Progressing     Problem: Nutrition/Hydration-ADULT  Goal: Nutrient/Hydration intake appropriate for improving, restoring or maintaining nutritional needs  Description: Monitor and assess patient's nutrition/hydration status for malnutrition. Collaborate with interdisciplinary team and initiate plan and interventions  as ordered.  Monitor patient's weight and dietary intake as ordered or per policy. Utilize nutrition screening tool and intervene as necessary. Determine patient's food preferences and provide high-protein, high-caloric foods as appropriate.     INTERVENTIONS:  - Monitor oral intake, urinary output, labs, and treatment plans  - Assess nutrition and hydration status and recommend course of action  - Evaluate amount of meals eaten  - Assist patient with eating if necessary   - Allow adequate time for meals  - Recommend/ encourage appropriate diets, oral nutritional supplements, and vitamin/mineral supplements  - Order, calculate, and assess calorie counts as needed  - Recommend, monitor, and adjust tube feedings and TPN/PPN based on assessed needs  - Assess need for intravenous fluids  - Provide specific nutrition/hydration education as appropriate  - Include patient/family/caregiver in decisions related to nutrition  9/2/2024 1355 by Isa Montanez RN  Outcome: Progressing  9/2/2024 0732 by Isa Montanez, RN  Outcome: Progressing

## 2024-09-02 NOTE — PLAN OF CARE
Problem: PAIN - ADULT  Goal: Verbalizes/displays adequate comfort level or baseline comfort level  Description: Interventions:  - Encourage patient to monitor pain and request assistance  - Assess pain using appropriate pain scale  - Administer analgesics based on type and severity of pain and evaluate response  - Implement non-pharmacological measures as appropriate and evaluate response  - Consider cultural and social influences on pain and pain management  - Notify physician/advanced practitioner if interventions unsuccessful or patient reports new pain  Outcome: Progressing     Problem: INFECTION - ADULT  Goal: Absence or prevention of progression during hospitalization  Description: INTERVENTIONS:  - Assess and monitor for signs and symptoms of infection  - Monitor lab/diagnostic results  - Monitor all insertion sites, i.e. indwelling lines, tubes, and drains  - Monitor endotracheal if appropriate and nasal secretions for changes in amount and color  - [place] appropriate cooling/warming therapies per order  - Administer medications as ordered  - Instruct and encourage patient and family to use good hand hygiene technique  - Identify and instruct in appropriate isolation precautions for identified infection/condition  Outcome: Progressing  Goal: Absence of fever/infection during neutropenic period  Description: INTERVENTIONS:  - Monitor WBC    Outcome: Progressing     Problem: SAFETY ADULT  Goal: Patient will remain free of falls  Description: INTERVENTIONS:  - Educate patient/family on patient safety including physical limitations  - Instruct patient to call for assistance with activity   - Consult OT/PT to assist with strengthening/mobility   - Keep Call bell within reach  - Keep bed low and locked with side rails adjusted as appropriate  - Keep care items and personal belongings within reach  - Initiate and maintain comfort rounds  - Make Fall Risk Sign visible to staff  - Offer Toileting every x Hours,  in advance of need  - Initiate/Maintain xalarm  - Obtain necessary fall risk management equipment: x  - Apply yellow socks and bracelet for high fall risk patients  - Consider moving patient to room near nurses station  Outcome: Progressing  Goal: Maintain or return to baseline ADL function  Description: INTERVENTIONS:  -  Assess patient's ability to carry out ADLs; assess patient's baseline for ADL function and identify physical deficits which impact ability to perform ADLs (bathing, care of mouth/teeth, toileting, grooming, dressing, etc.)  - Assess/evaluate cause of self-care deficits   - Assess range of motion  - Assess patient's mobility; develop plan if impaired  - Assess patient's need for assistive devices and provide as appropriate  - Encourage maximum independence but intervene and supervise when necessary  - Involve family in performance of ADLs  - Assess for home care needs following discharge   - Consider OT consult to assist with ADL evaluation and planning for discharge  - Provide patient education as appropriate  Outcome: Progressing  Goal: Maintains/Returns to pre admission functional level  Description: INTERVENTIONS:  - Perform AM-PAC 6 Click Basic Mobility/ Daily Activity assessment daily.  - Set and communicate daily mobility goal to care team and patient/family/caregiver.   - Collaborate with rehabilitation services on mobility goals if consulted  - Perform Range of Motion x times a day.  - Reposition patient every x hours.  - Dangle patient x times a day  - Stand patient x times a day  - Ambulate patient xx times a day  - Out of bed to chair x times a day   - Out of bed for meals x times a day  - Out of bed for toileting  - Record patient progress and toleration of activity level   Outcome: Progressing     Problem: DISCHARGE PLANNING  Goal: Discharge to home or other facility with appropriate resources  Description: INTERVENTIONS:  - Identify barriers to discharge w/patient and caregiver  -  Arrange for needed discharge resources and transportation as appropriate  - Identify discharge learning needs (meds, wound care, etc.)  - Arrange for interpretive services to assist at discharge as needed  - Refer to Case Management Department for coordinating discharge planning if the patient needs post-hospital services based on physician/advanced practitioner order or complex needs related to functional status, cognitive ability, or social support system  Outcome: Progressing     Problem: Knowledge Deficit  Goal: Patient/family/caregiver demonstrates understanding of disease process, treatment plan, medications, and discharge instructions  Description: Complete learning assessment and assess knowledge base.  Interventions:  - Provide teaching at level of understanding  - Provide teaching via preferred learning methods  Outcome: Progressing     Problem: Nutrition/Hydration-ADULT  Goal: Nutrient/Hydration intake appropriate for improving, restoring or maintaining nutritional needs  Description: Monitor and assess patient's nutrition/hydration status for malnutrition. Collaborate with interdisciplinary team and initiate plan and interventions as ordered.  Monitor patient's weight and dietary intake as ordered or per policy. Utilize nutrition screening tool and intervene as necessary. Determine patient's food preferences and provide high-protein, high-caloric foods as appropriate.     INTERVENTIONS:  - Monitor oral intake, urinary output, labs, and treatment plans  - Assess nutrition and hydration status and recommend course of action  - Evaluate amount of meals eaten  - Assist patient with eating if necessary   - Allow adequate time for meals  - Recommend/ encourage appropriate diets, oral nutritional supplements, and vitamin/mineral supplements  - Order, calculate, and assess calorie counts as needed  - Recommend, monitor, and adjust tube feedings and TPN/PPN based on assessed needs  - Assess need for intravenous  fluids  - Provide specific nutrition/hydration education as appropriate  - Include patient/family/caregiver in decisions related to nutrition  Outcome: Progressing

## 2024-09-03 ENCOUNTER — APPOINTMENT (INPATIENT)
Dept: RADIOLOGY | Facility: HOSPITAL | Age: 69
DRG: 504 | End: 2024-09-03
Payer: MEDICARE

## 2024-09-03 ENCOUNTER — ANESTHESIA EVENT (INPATIENT)
Dept: PERIOP | Facility: HOSPITAL | Age: 69
DRG: 504 | End: 2024-09-03
Payer: MEDICARE

## 2024-09-03 ENCOUNTER — ANESTHESIA (INPATIENT)
Dept: PERIOP | Facility: HOSPITAL | Age: 69
DRG: 504 | End: 2024-09-03
Payer: MEDICARE

## 2024-09-03 PROBLEM — E44.0 MODERATE PROTEIN-CALORIE MALNUTRITION (HCC): Status: ACTIVE | Noted: 2024-09-03

## 2024-09-03 LAB
ANION GAP SERPL CALCULATED.3IONS-SCNC: 2 MMOL/L (ref 4–13)
BASOPHILS # BLD AUTO: 0.03 THOUSANDS/ÂΜL (ref 0–0.1)
BASOPHILS NFR BLD AUTO: 1 % (ref 0–1)
BUN SERPL-MCNC: 18 MG/DL (ref 5–25)
CALCIUM SERPL-MCNC: 9.9 MG/DL (ref 8.4–10.2)
CHLORIDE SERPL-SCNC: 102 MMOL/L (ref 96–108)
CO2 SERPL-SCNC: 32 MMOL/L (ref 21–32)
CREAT SERPL-MCNC: 0.77 MG/DL (ref 0.6–1.3)
EOSINOPHIL # BLD AUTO: 0.19 THOUSAND/ÂΜL (ref 0–0.61)
EOSINOPHIL NFR BLD AUTO: 5 % (ref 0–6)
ERYTHROCYTE [DISTWIDTH] IN BLOOD BY AUTOMATED COUNT: 13 % (ref 11.6–15.1)
GFR SERPL CREATININE-BSD FRML MDRD: 79 ML/MIN/1.73SQ M
GLUCOSE SERPL-MCNC: 108 MG/DL (ref 65–140)
GLUCOSE SERPL-MCNC: 77 MG/DL (ref 65–140)
HCT VFR BLD AUTO: 36.2 % (ref 34.8–46.1)
HGB BLD-MCNC: 11.9 G/DL (ref 11.5–15.4)
IMM GRANULOCYTES # BLD AUTO: 0.01 THOUSAND/UL (ref 0–0.2)
IMM GRANULOCYTES NFR BLD AUTO: 0 % (ref 0–2)
LYMPHOCYTES # BLD AUTO: 1.53 THOUSANDS/ÂΜL (ref 0.6–4.47)
LYMPHOCYTES NFR BLD AUTO: 36 % (ref 14–44)
MCH RBC QN AUTO: 31.9 PG (ref 26.8–34.3)
MCHC RBC AUTO-ENTMCNC: 32.9 G/DL (ref 31.4–37.4)
MCV RBC AUTO: 97 FL (ref 82–98)
MONOCYTES # BLD AUTO: 0.44 THOUSAND/ÂΜL (ref 0.17–1.22)
MONOCYTES NFR BLD AUTO: 10 % (ref 4–12)
NEUTROPHILS # BLD AUTO: 2.03 THOUSANDS/ÂΜL (ref 1.85–7.62)
NEUTS SEG NFR BLD AUTO: 48 % (ref 43–75)
NRBC BLD AUTO-RTO: 0 /100 WBCS
PLATELET # BLD AUTO: 111 THOUSANDS/UL (ref 149–390)
PMV BLD AUTO: 11.1 FL (ref 8.9–12.7)
POTASSIUM SERPL-SCNC: 4.7 MMOL/L (ref 3.5–5.3)
RBC # BLD AUTO: 3.73 MILLION/UL (ref 3.81–5.12)
SODIUM SERPL-SCNC: 136 MMOL/L (ref 135–147)
VANCOMYCIN SERPL-MCNC: 20.4 UG/ML (ref 10–20)
WBC # BLD AUTO: 4.23 THOUSAND/UL (ref 4.31–10.16)

## 2024-09-03 PROCEDURE — 88311 DECALCIFY TISSUE: CPT | Performed by: PATHOLOGY

## 2024-09-03 PROCEDURE — 80048 BASIC METABOLIC PNL TOTAL CA: CPT | Performed by: INTERNAL MEDICINE

## 2024-09-03 PROCEDURE — 85025 COMPLETE CBC W/AUTO DIFF WBC: CPT | Performed by: INTERNAL MEDICINE

## 2024-09-03 PROCEDURE — 99232 SBSQ HOSP IP/OBS MODERATE 35: CPT | Performed by: INTERNAL MEDICINE

## 2024-09-03 PROCEDURE — 28825 PARTIAL AMPUTATION OF TOE: CPT | Performed by: PODIATRIST

## 2024-09-03 PROCEDURE — 82948 REAGENT STRIP/BLOOD GLUCOSE: CPT

## 2024-09-03 PROCEDURE — 0Y6R0Z0 DETACHMENT AT RIGHT 2ND TOE, COMPLETE, OPEN APPROACH: ICD-10-PCS | Performed by: PODIATRIST

## 2024-09-03 PROCEDURE — NC001 PR NO CHARGE: Performed by: PODIATRIST

## 2024-09-03 PROCEDURE — 93005 ELECTROCARDIOGRAM TRACING: CPT

## 2024-09-03 PROCEDURE — 73630 X-RAY EXAM OF FOOT: CPT

## 2024-09-03 PROCEDURE — 80202 ASSAY OF VANCOMYCIN: CPT | Performed by: INTERNAL MEDICINE

## 2024-09-03 PROCEDURE — 88305 TISSUE EXAM BY PATHOLOGIST: CPT | Performed by: PATHOLOGY

## 2024-09-03 RX ORDER — HYDROMORPHONE HCL/PF 1 MG/ML
0.5 SYRINGE (ML) INJECTION EVERY 4 HOURS PRN
Status: DISCONTINUED | OUTPATIENT
Start: 2024-09-03 | End: 2024-09-05 | Stop reason: HOSPADM

## 2024-09-03 RX ORDER — DIPHENHYDRAMINE HYDROCHLORIDE 50 MG/ML
25 INJECTION INTRAMUSCULAR; INTRAVENOUS EVERY 6 HOURS PRN
Status: DISCONTINUED | OUTPATIENT
Start: 2024-09-03 | End: 2024-09-05 | Stop reason: HOSPADM

## 2024-09-03 RX ORDER — ONDANSETRON 2 MG/ML
4 INJECTION INTRAMUSCULAR; INTRAVENOUS ONCE AS NEEDED
Status: DISCONTINUED | OUTPATIENT
Start: 2024-09-03 | End: 2024-09-03 | Stop reason: HOSPADM

## 2024-09-03 RX ORDER — PROPOFOL 10 MG/ML
INJECTION, EMULSION INTRAVENOUS CONTINUOUS PRN
Status: DISCONTINUED | OUTPATIENT
Start: 2024-09-03 | End: 2024-09-03

## 2024-09-03 RX ORDER — MIDAZOLAM HYDROCHLORIDE 2 MG/2ML
INJECTION, SOLUTION INTRAMUSCULAR; INTRAVENOUS AS NEEDED
Status: DISCONTINUED | OUTPATIENT
Start: 2024-09-03 | End: 2024-09-03

## 2024-09-03 RX ORDER — PHENYLEPHRINE HCL IN 0.9% NACL 1 MG/10 ML
SYRINGE (ML) INTRAVENOUS AS NEEDED
Status: DISCONTINUED | OUTPATIENT
Start: 2024-09-03 | End: 2024-09-03

## 2024-09-03 RX ORDER — PROPOFOL 10 MG/ML
INJECTION, EMULSION INTRAVENOUS AS NEEDED
Status: DISCONTINUED | OUTPATIENT
Start: 2024-09-03 | End: 2024-09-03

## 2024-09-03 RX ORDER — SPIRONOLACTONE 25 MG/1
50 TABLET ORAL DAILY
Status: DISCONTINUED | OUTPATIENT
Start: 2024-09-04 | End: 2024-09-05 | Stop reason: HOSPADM

## 2024-09-03 RX ORDER — HYDROMORPHONE HCL/PF 1 MG/ML
0.5 SYRINGE (ML) INJECTION
Status: DISCONTINUED | OUTPATIENT
Start: 2024-09-03 | End: 2024-09-03 | Stop reason: HOSPADM

## 2024-09-03 RX ORDER — SODIUM CHLORIDE, SODIUM LACTATE, POTASSIUM CHLORIDE, CALCIUM CHLORIDE 600; 310; 30; 20 MG/100ML; MG/100ML; MG/100ML; MG/100ML
INJECTION, SOLUTION INTRAVENOUS CONTINUOUS PRN
Status: DISCONTINUED | OUTPATIENT
Start: 2024-09-03 | End: 2024-09-03

## 2024-09-03 RX ORDER — CALCIUM CARBONATE 500 MG/1
1000 TABLET, CHEWABLE ORAL 3 TIMES DAILY PRN
Status: DISCONTINUED | OUTPATIENT
Start: 2024-09-03 | End: 2024-09-05

## 2024-09-03 RX ORDER — FENTANYL CITRATE 50 UG/ML
INJECTION, SOLUTION INTRAMUSCULAR; INTRAVENOUS AS NEEDED
Status: DISCONTINUED | OUTPATIENT
Start: 2024-09-03 | End: 2024-09-03

## 2024-09-03 RX ORDER — OXYCODONE AND ACETAMINOPHEN 5; 325 MG/1; MG/1
1 TABLET ORAL EVERY 4 HOURS PRN
Status: DISCONTINUED | OUTPATIENT
Start: 2024-09-03 | End: 2024-09-05 | Stop reason: HOSPADM

## 2024-09-03 RX ORDER — FENTANYL CITRATE/PF 50 MCG/ML
50 SYRINGE (ML) INJECTION
Status: DISCONTINUED | OUTPATIENT
Start: 2024-09-03 | End: 2024-09-03 | Stop reason: HOSPADM

## 2024-09-03 RX ORDER — ONDANSETRON 2 MG/ML
4 INJECTION INTRAMUSCULAR; INTRAVENOUS EVERY 6 HOURS PRN
Status: DISCONTINUED | OUTPATIENT
Start: 2024-09-03 | End: 2024-09-05 | Stop reason: HOSPADM

## 2024-09-03 RX ORDER — SIMETHICONE 80 MG
80 TABLET,CHEWABLE ORAL EVERY 6 HOURS PRN
Status: DISCONTINUED | OUTPATIENT
Start: 2024-09-03 | End: 2024-09-05 | Stop reason: HOSPADM

## 2024-09-03 RX ADMIN — VANCOMYCIN HYDROCHLORIDE 1250 MG: 5 INJECTION, POWDER, LYOPHILIZED, FOR SOLUTION INTRAVENOUS at 19:40

## 2024-09-03 RX ADMIN — OXYCODONE HYDROCHLORIDE AND ACETAMINOPHEN 1 TABLET: 5; 325 TABLET ORAL at 23:56

## 2024-09-03 RX ADMIN — FLUTICASONE FUROATE AND VILANTEROL TRIFENATATE 1 PUFF: 100; 25 POWDER RESPIRATORY (INHALATION) at 08:22

## 2024-09-03 RX ADMIN — ALPRAZOLAM 0.5 MG: 0.5 TABLET ORAL at 22:16

## 2024-09-03 RX ADMIN — FENTANYL CITRATE 50 MCG: 50 INJECTION, SOLUTION INTRAMUSCULAR; INTRAVENOUS at 15:10

## 2024-09-03 RX ADMIN — Medication 6 MG: at 22:16

## 2024-09-03 RX ADMIN — PROPOFOL 20 MG: 10 INJECTION, EMULSION INTRAVENOUS at 15:14

## 2024-09-03 RX ADMIN — HYDROMORPHONE HYDROCHLORIDE 0.5 MG: 1 INJECTION, SOLUTION INTRAMUSCULAR; INTRAVENOUS; SUBCUTANEOUS at 21:15

## 2024-09-03 RX ADMIN — CEFEPIME HYDROCHLORIDE 2000 MG: 2 INJECTION, SOLUTION INTRAVENOUS at 22:26

## 2024-09-03 RX ADMIN — CALCIUM CARBONATE (ANTACID) CHEW TAB 500 MG 1000 MG: 500 CHEW TAB at 23:51

## 2024-09-03 RX ADMIN — MIDAZOLAM 2 MG: 1 INJECTION INTRAMUSCULAR; INTRAVENOUS at 15:08

## 2024-09-03 RX ADMIN — MIDAZOLAM 2 MG: 1 INJECTION INTRAMUSCULAR; INTRAVENOUS at 15:04

## 2024-09-03 RX ADMIN — CEFEPIME HYDROCHLORIDE 2000 MG: 2 INJECTION, SOLUTION INTRAVENOUS at 08:22

## 2024-09-03 RX ADMIN — SODIUM CHLORIDE, SODIUM LACTATE, POTASSIUM CHLORIDE, AND CALCIUM CHLORIDE: .6; .31; .03; .02 INJECTION, SOLUTION INTRAVENOUS at 14:38

## 2024-09-03 RX ADMIN — Medication 200 MCG: at 15:26

## 2024-09-03 RX ADMIN — OXYCODONE HYDROCHLORIDE AND ACETAMINOPHEN 1 TABLET: 5; 325 TABLET ORAL at 18:06

## 2024-09-03 RX ADMIN — FENTANYL CITRATE 50 MCG: 50 INJECTION, SOLUTION INTRAMUSCULAR; INTRAVENOUS at 15:15

## 2024-09-03 RX ADMIN — GABAPENTIN 300 MG: 300 CAPSULE ORAL at 08:22

## 2024-09-03 RX ADMIN — FOLIC ACID 1 MG: 1 TABLET ORAL at 08:22

## 2024-09-03 RX ADMIN — PROPOFOL 80 MCG/KG/MIN: 10 INJECTION, EMULSION INTRAVENOUS at 15:10

## 2024-09-03 RX ADMIN — PANTOPRAZOLE SODIUM 40 MG: 40 TABLET, DELAYED RELEASE ORAL at 05:20

## 2024-09-03 RX ADMIN — GABAPENTIN 300 MG: 300 CAPSULE ORAL at 22:15

## 2024-09-03 RX ADMIN — Medication 100 MG: at 08:22

## 2024-09-03 NOTE — ANESTHESIA POSTPROCEDURE EVALUATION
Post-Op Assessment Note    CV Status:  Stable  Pain Score: 0    Pain management: adequate       Mental Status:  Alert and awake   Hydration Status:  Euvolemic and stable   PONV Controlled:  None   Airway Patency:  Patent     Post Op Vitals Reviewed: Yes    No anethesia notable event occurred.    Staff: CRNA               /57 (09/03/24 1542)    Temp      Pulse (!) 51 (09/03/24 1542)   Resp 12 (09/03/24 1542)    SpO2 100 % (09/03/24 1542)

## 2024-09-03 NOTE — CASE MANAGEMENT
Case Management Assessment & Discharge Planning Note    Patient name Finn Oleary  Location OR POOL/OR POOL MRN [id number]  : 1955 Date 9/3/2024       Current Admission Date: 2024  Current Admission Diagnosis:Osteomyelitis of second toe of right foot (HCC)   Patient Active Problem List    Diagnosis Date Noted Date Diagnosed    Moderate protein-calorie malnutrition (HCC) 2024     CVA (cerebral vascular accident) (HCC)      Skin ulcer of second toe, right, with necrosis of bone (HCC) 2024     Osteomyelitis of second toe of right foot (HCC) 2024     COPD (chronic obstructive pulmonary disease) (HCC) 2024     Positive blood culture 2024     Alcohol abuse 2024     Osteomyelitis of great toe of right foot (HCC) 2024     Metabolic encephalopathy 2024     Thrombocytopenia (HCC) 2024     Elevated TSH 2024     Tobacco use disorder 2022     Generalized anxiety disorder 2022     Major depressive disorder, recurrent episode, moderate (HCC) 2022     Presence of surgical incision 2021     Ambulatory dysfunction 2021     Esophageal candidiasis (HCC) 2019     Alcoholic cirrhosis (HCC) 2019     Ascites 2019     Jaundice 2019     Hepatic encephalopathy (HCC) 2019     Esophageal varices (HCC) 2019       LOS (days): 2  Geometric Mean LOS (GMLOS) (days): 4.4  Days to GMLOS:2.6     OBJECTIVE:    Risk of Unplanned Readmission Score: 22.36         Current admission status: Inpatient       Preferred Pharmacy:   [id number] - [place] - [id number]  [id number] [facility] [address]  Phone: [phone] Fax: [phone]    Primary Care Provider: Scot Brink MD    Primary Insurance: MEDICARE  Secondary Insurance: [facility]    ASSESSMENT:  Active Health Care Proxies       Shagufta Prakash Health Care Representative - Child    Primary Phone: [phone] (Mobile)  Home Phone: [phone]                 Advance Directives  Does patient have a Health Care POA?: No  Was patient offered paperwork?: Yes (declined)  Does patient currently have a Health Care decision maker?: Yes, please see Health Care Proxy section  Does patient have Advance Directives?: No  Was patient offered paperwork?: Yes (declined)  Primary Contact: Prakash Eagle, manuela         Readmission Root Cause  30 Day Readmission: No    Patient Information  Admitted from:: Home  Mental Status: Alert  During Assessment patient was accompanied by: Not accompanied during assessment  Assessment information provided by:: Patient  Primary Caregiver: Self  Support Systems: Self, Children  County of Residence: [place]  What city do you live in?: [place]  Home entry access options. Select all that apply.: Stairs  Number of steps to enter home.: 5  Type of Current Residence: Apartment  Floor Level: 1  Upon entering residence, is there a bedroom on the main floor (no further steps)?: Yes  Upon entering residence, is there a bathroom on the main floor (no further steps)?: Yes  Living Arrangements: Lives Alone    Activities of Daily Living Prior to Admission  Functional Status: Independent  Completes ADLs independently?: Yes  Ambulates independently?: Yes  Does patient use assisted devices?: No  Does patient currently own DME?: Yes  What DME does the patient currently own?: Shower Chair, Straight Cane, Walker  Does patient have a history of Outpatient Therapy (PT/OT)?: No  Does the patient have a history of Short-Term Rehab?: Yes ([facility])  Does patient have a history of HHC?: No  Does patient currently have HHC?: No         Patient Information Continued  Income Source: SSI/SSD  Does patient have prescription coverage?: Yes  Does patient receive dialysis treatments?: No  Does patient have a history of substance abuse?: No  Does patient have a history of Mental Health Diagnosis?: No          Means of Transportation  Means of Transport to Appts:: Other (Comment) ([facility])      Social Determinants of Health (SDOH)      Flowsheet Row Most Recent Value   Housing Stability    In the last 12 months, was there a time when you were not able to pay the mortgage or rent on time? N   In the past 12 months, how many times have you moved where you were living? 1   At any time in the past 12 months, were you homeless or living in a shelter (including now)? N   Transportation Needs    In the past 12 months, has lack of transportation kept you from medical appointments or from getting medications? no   In the past 12 months, has lack of transportation kept you from meetings, work, or from getting things needed for daily living? No   Food Insecurity    Within the past 12 months, you worried that your food would run out before you got the money to buy more. Never true   Within the past 12 months, the food you bought just didn't last and you didn't have money to get more. Never true   Utilities    In the past 12 months has the electric, gas, oil, or water company threatened to shut off services in your home? No            DISCHARGE DETAILS:    Discharge planning discussed with:: Patient  Freedom of Choice: Yes     CM contacted family/caregiver?: No- see comments (Pt is alert and oriented)  Were Treatment Team discharge recommendations reviewed with patient/caregiver?: Yes  Did patient/caregiver verbalize understanding of patient care needs?: Yes  Were patient/caregiver advised of the risks associated with not following Treatment Team discharge recommendations?: Yes         Requested Home Health Care         Is the patient interested in HHC at discharge?: No    DME Referral Provided  Referral made for DME?: No              Treatment Team Recommendation: Home  Discharge Destination Plan:: Home  Transport at Discharge : Free Local Transportation                                      Additional Comments: CM met with pt to  discuss role of CM and any other needs prior to discharge. Pt lives alone in 1st flr apartment w/ 5 MOOSE. Indp PTA. Owns shower chair, SPC, RW. Hx STR through [facility], No hx OP PT/HH/DA/MH. Pt uses [facility] for transportation. Pt prefers to use [facility] pharmacy. PT may need Lyft home pending PT recs.

## 2024-09-03 NOTE — ASSESSMENT & PLAN NOTE
Malnutrition Findings:   Adult Malnutrition type: Chronic illness  Adult Degree of Malnutrition: Malnutrition of moderate degree  Malnutrition Characteristics: Fat loss, Muscle loss                  360 Statement: Pt presents with moderate protein calorie malnutrition as evidneced by someheat hollowed orbitals, prominent clavicle bone and b/l temporal depressions. Treat with diet and supplements BID.    BMI Findings:           Body mass index is 21.61 kg/m².

## 2024-09-03 NOTE — OP NOTE
OPERATIVE REPORT - Podiatry  PATIENT NAME: Finn Oleary    :  1955  MRN: [id number]  Pt Location: [facility] OR ROOM 01    SURGERY DATE: 9/3/2024    Surgeons and Role:     * Niru Baldwin DPM - Primary     * Vince Jaramillo DPM - Assisting    Pre-op Diagnosis:  Osteomyelitis of second toe of right foot (HCC) [M86.9]  Skin ulcer of second toe, right, with necrosis of bone (HCC) [L97.514]    Post-Op Diagnosis Codes:     * Osteomyelitis of second toe of right foot (HCC) [M86.9]     * Skin ulcer of second toe, right, with necrosis of bone (HCC) [L97.514]    Procedure(s) (LRB):  AMPUTATION RIGHT 2ND TOE (Right)    Specimen(s):  ID Type Source Tests Collected by Time Destination   1 : Right 2nd Toe Tissue Toe, Right TISSUE EXAM Niru Baldwin DPM 9/3/2024 1523        Estimated Blood Loss:   Minimal    Drains:  * No LDAs found *    Anesthesia Type:   IV Sedation with Anesthesia with 10 ml of 1% Lidocaine and 0.5% Bupivacaine in a 1:1 mixture    Hemostasis:  Mechanical    Materials:  * No implants in log *  4-0 Prolene    Operative Findings:  1.  Surgical cure believed to be achieved  2.  Hard, white, healthy cartilage and bone noted to right second middle phalanx head  3.  Healthy bleeding viable soft tissue    Complications:   None    Procedure and Technique:     Under mild sedation, the patient was brought into the operating room and placed on the hospital stretcher in the supine position. IV sedation was achieved by anesthesia team and a universal timeout was performed where all parties are in agreement of correct patient, correct procedure and correct site. A right second toe digital block was performed consisting of 10 ml of 1% Lidocaine and 0.5% Bupivacaine in a 1:1 mixture. The foot was then prepped and draped in the usual aseptic manner.  A pneumatic tourniquet was not applied to the right lower extremity for this procedure    Attention was directed to the right 2nd toe digit where a modified arpan type of  "incision was made at the level of the DIPJ. Utilizing a sterile 15 blade, this incision was carried deep straight to bone. Soft tissue structures were then reflected off the distal phalanx base and middle phalanx head to disarticulate the left second DIP joint. The severed digit was then passed off to the back table.  It was noted that all tissue margins were bleeding and viable. No deep sinus tracts or areas of purulence were visualized. The remaining bone on the proximal aspect of the cut was noted to be of hard and viable quality. Any remaining tendinous structures were identified and severed proximally to remove any nidus of infection.  The surgical site was then cleansed with copious amounts of sterile saline.  Skin closure was achieved using 4-0 Prolene.  The right foot was then cleansed and dried with hydroperoxide and sterile saline.  The incision site was then dressed with Adaptic, 4 x 4 gauze, Kerlix, and Ace wrap    The patient tolerated the procedure and anesthesia well without immediate complications and transferred to PACU with vital signs stable.     As with many limb salvage procedures, we contemplate the possibility of performing further stages to this procedure. Procedures may include debridements, delayed closure, plastic surgery techniques, or more proximal amputations. This procedure may be considered part of a multi-staged limb salvage treatment plan.     Dr. Baldwin was present during the entire procedure and participated in all key aspects.    SIGNATURE: Vince Jaramillo DPM  DATE: September 3, 2024  TIME: 3:38 PM      Portions of the record may have been created with voice recognition software. Occasional wrong word or \"sound a like\" substitutions may have occurred due to the inherent limitations of voice recognition software. Read the chart carefully and recognize, using context, where substitutions have occurred.            "

## 2024-09-03 NOTE — PLAN OF CARE
Problem: PAIN - ADULT  Goal: Verbalizes/displays adequate comfort level or baseline comfort level  Description: Interventions:  - Encourage patient to monitor pain and request assistance  - Assess pain using appropriate pain scale  - Administer analgesics based on type and severity of pain and evaluate response  - Implement non-pharmacological measures as appropriate and evaluate response  - Consider cultural and social influences on pain and pain management  - Notify physician/advanced practitioner if interventions unsuccessful or patient reports new pain  Outcome: Progressing     Problem: INFECTION - ADULT  Goal: Absence or prevention of progression during hospitalization  Description: INTERVENTIONS:  - Assess and monitor for signs and symptoms of infection  - Monitor lab/diagnostic results  - Monitor all insertion sites, i.e. indwelling lines, tubes, and drains  - Monitor endotracheal if appropriate and nasal secretions for changes in amount and color  - [place] appropriate cooling/warming therapies per order  - Administer medications as ordered  - Instruct and encourage patient and family to use good hand hygiene technique  - Identify and instruct in appropriate isolation precautions for identified infection/condition  Outcome: Progressing  Goal: Absence of fever/infection during neutropenic period  Description: INTERVENTIONS:  - Monitor WBC    Outcome: Progressing     Problem: SAFETY ADULT  Goal: Patient will remain free of falls  Description: INTERVENTIONS:  - Educate patient/family on patient safety including physical limitations  - Instruct patient to call for assistance with activity   - Consult OT/PT to assist with strengthening/mobility   - Keep Call bell within reach  - Keep bed low and locked with side rails adjusted as appropriate  - Keep care items and personal belongings within reach  - Initiate and maintain comfort rounds  - Make Fall Risk Sign visible to staff  - Offer Toileting every 2 Hours,  in advance of need  Outcome: Progressing  Goal: Maintain or return to baseline ADL function  Description: INTERVENTIONS:  -  Assess patient's ability to carry out ADLs; assess patient's baseline for ADL function and identify physical deficits which impact ability to perform ADLs (bathing, care of mouth/teeth, toileting, grooming, dressing, etc.)  - Assess/evaluate cause of self-care deficits   - Assess range of motion  - Assess patient's mobility; develop plan if impaired  - Assess patient's need for assistive devices and provide as appropriate  - Encourage maximum independence but intervene and supervise when necessary  - Involve family in performance of ADLs  - Assess for home care needs following discharge   - Consider OT consult to assist with ADL evaluation and planning for discharge  - Provide patient education as appropriate  Outcome: Progressing       Problem: DISCHARGE PLANNING  Goal: Discharge to home or other facility with appropriate resources  Description: INTERVENTIONS:  - Identify barriers to discharge w/patient and caregiver  - Arrange for needed discharge resources and transportation as appropriate  - Identify discharge learning needs (meds, wound care, etc.)  - Arrange for interpretive services to assist at discharge as needed  - Refer to Case Management Department for coordinating discharge planning if the patient needs post-hospital services based on physician/advanced practitioner order or complex needs related to functional status, cognitive ability, or social support system  Outcome: Progressing     Problem: Knowledge Deficit  Goal: Patient/family/caregiver demonstrates understanding of disease process, treatment plan, medications, and discharge instructions  Description: Complete learning assessment and assess knowledge base.  Interventions:  - Provide teaching at level of understanding  - Provide teaching via preferred learning methods  Outcome: Progressing     Problem:  Nutrition/Hydration-ADULT  Goal: Nutrient/Hydration intake appropriate for improving, restoring or maintaining nutritional needs  Description: Monitor and assess patient's nutrition/hydration status for malnutrition. Collaborate with interdisciplinary team and initiate plan and interventions as ordered.  Monitor patient's weight and dietary intake as ordered or per policy. Utilize nutrition screening tool and intervene as necessary. Determine patient's food preferences and provide high-protein, high-caloric foods as appropriate.     INTERVENTIONS:  - Monitor oral intake, urinary output, labs, and treatment plans  - Assess nutrition and hydration status and recommend course of action  - Evaluate amount of meals eaten  - Assist patient with eating if necessary   - Allow adequate time for meals  - Recommend/ encourage appropriate diets, oral nutritional supplements, and vitamin/mineral supplements  - Order, calculate, and assess calorie counts as needed  - Recommend, monitor, and adjust tube feedings and TPN/PPN based on assessed needs  - Assess need for intravenous fluids  - Provide specific nutrition/hydration education as appropriate  - Include patient/family/caregiver in decisions related to nutrition  Outcome: Progressing     Problem: Potential for Falls  Goal: Patient will remain free of falls  Description: INTERVENTIONS:  - Educate patient/family on patient safety including physical limitations  - Instruct patient to call for assistance with activity   - Consult OT/PT to assist with strengthening/mobility   - Keep Call bell within reach  - Keep bed low and locked with side rails adjusted as appropriate  - Keep care items and personal belongings within reach  - Initiate and maintain comfort rounds  - Make Fall Risk Sign visible to staff  - Offer Toileting every 2 Hours, in advance of need  - Apply yellow socks and bracelet for high fall risk patients  - Consider moving patient to room near nurses  station  Outcome: Progressing

## 2024-09-03 NOTE — PROGRESS NOTES
"Podiatry - Progress Note  Patient: Finn Oleary 68 y.o. female   MRN: [id number]  PCP: Scot Brink MD  Unit/Bed#: -01 Encounter: [id number]  Date Of Visit: 24    ASSESSMENT:    Finn Oleary is a 68 y.o. female with:    Right distal second toe ulceration with underlying osteomyelitis  Tobacco abuse disorder  Alcoholic cirrhosis  Thrombocytopenia  COPD  Moderate protein-calorie malnutrition      PLAN:    Patient to go to OR today,24, for right partial second toe amputation with Dr. Baldwin.  Consent to be signed with surgeon prior to procedure.  Confirmed NPO status.  H&P, vitals, and current labs reviewed.  No acute changes noted.  Alternatives, risks, and complications discussed with patient.  All questions answered.  No guarantees given of outcome.  Rest of medical care per primary team.       SUBJECTIVE:     The patient was seen, evaluated, and assessed at bedside today. The patient was awake, alert, and in no acute distress. Patient confirmed NPO status. All questions and concerns regarding the surgical procedure addressed. Patient understands risks vs benefits of procedure and remains amenable with plan for surgery today. Patient denies N/V/F/chills/SOB/CP.      OBJECTIVE:     Vitals:   BP (!) 96/39 (BP Location: Right arm)   Pulse 58   Temp (!) 96.6 °F (35.9 °C) (Temporal)   Resp 16   Ht 5' 3\" (1.6 m)   Wt 55.3 kg (122 lb)   LMP  (LMP Unknown)   SpO2 91%   BMI 21.61 kg/m²     Temp (24hrs), Av.7 °F (35.9 °C), Min:96.6 °F (35.9 °C), Max:96.8 °F (36 °C)      Physical Exam:     General:  Alert, cooperative, and in no distress.  Lower extremity exam:  Cardiovascular status at baseline.  Neurological status at baseline.  Musculoskeletal status at baseline. No calf tenderness noted bilaterally. Dressing left intact to the Operating Room.     Additional Data:     Labs:    Results from last 7 days   Lab Units 24  0506   WBC Thousand/uL 4.23*   HEMOGLOBIN g/dL 11.9   HEMATOCRIT % " "36.2   PLATELETS Thousands/uL 111*   SEGS PCT % 48   LYMPHO PCT % 36   MONO PCT % 10   EOS PCT % 5     Results from last 7 days   Lab Units 09/03/24  0506 09/02/24  0503 09/01/24 2011   POTASSIUM mmol/L 4.7   < > 4.4   CHLORIDE mmol/L 102   < > 99   CO2 mmol/L 32   < > 35*   BUN mg/dL 18   < > 12   CREATININE mg/dL 0.77   < > 0.93   CALCIUM mg/dL 9.9   < > 10.0   ALK PHOS U/L  --   --  121*   ALT U/L  --   --  17   AST U/L  --   --  38    < > = values in this interval not displayed.     Results from last 7 days   Lab Units 09/01/24 2011   INR  1.25*       * I Have Reviewed All Lab Data Listed Above.    Recent Cultures (last 7 days):               Imaging: I have personally reviewed pertinent films in PACS  EKG, Pathology, and Other Studies: I have personally reviewed pertinent reports.    ** Please Note: Portions of the record may have been created with voice recognition software. Occasional wrong word or \"sound a like\" substitutions may have occurred due to the inherent limitations of voice recognition software. Read the chart carefully and recognize, using context, where substitutions have occurred. **      "

## 2024-09-03 NOTE — PROGRESS NOTES
Finn Oleary is a 68 y.o. female who is currently ordered Vancomycin IV with management by the Pharmacy Consult service.  Relevant clinical data and objective / subjective history reviewed.  Vancomycin Assessment:  Indication and Goal AUC/Trough: Bone/joint infection (goal -600, trough >10)  Clinical Status: stable  Micro:   N/A  Renal Function:  SCr: 0.77 mg/dL  CrCl: 57 mL/min  Renal replacement: Not on dialysis  Days of Therapy: 3  Current Dose: 1250mg every 24 hours  Vancomycin Plan: random level resulted 20.4 - extrapolated trough is ~ 11, which is therapeutic. Continue current dose.  New Dosing: No change  Estimated AUC: 516 mcg*hr/mL  Estimated Trough: 11.8 mcg/mL  Next Level: 9/6/24 at 0600  Renal Function Monitoring: Daily BMP and UOP  Pharmacy will continue to follow closely for s/sx of nephrotoxicity, infusion reactions and appropriateness of therapy.  BMP and CBC will be ordered per protocol. We will continue to follow the patient’s culture results and clinical progress daily. Also, cefepime will be adjusted if necessary.    Jarod Alvarez, Pharmacist, PharmD, BCPS

## 2024-09-03 NOTE — PROGRESS NOTES
[facility]  Progress Note  Name: Finn Oleary I  MRN: [id number]  Unit/Bed#: OR POOL I Date of Admission: 9/1/2024   Date of Service: 9/3/2024 I Hospital Day: 2    Assessment & Plan   Moderate protein-calorie malnutrition (HCC)  Assessment & Plan  Malnutrition Findings:   Adult Malnutrition type: Chronic illness  Adult Degree of Malnutrition: Malnutrition of moderate degree  Malnutrition Characteristics: Fat loss, Muscle loss                  360 Statement: Pt presents with moderate protein calorie malnutrition as evidneced by someheat hollowed orbitals, prominent clavicle bone and b/l temporal depressions. Treat with diet and supplements BID.    BMI Findings:           Body mass index is 21.61 kg/m².       COPD (chronic obstructive pulmonary disease) ([facility])  Assessment & Plan  Continue home inhalers  Does not appear in COPD exacerbation at this time    Thrombocytopenia ([facility])  Assessment & Plan  Plt 118  Baseline about    Thrombocytopenia secondary to cirrhosis   Continue to monitor   Recent Labs     09/01/24 2011 09/02/24  0503 09/03/24  0506   * 84* 111*        Tobacco use disorder  Assessment & Plan  Encouraged cessation    Alcoholic cirrhosis ([facility])  Assessment & Plan  History of alcoholic cirrhosis complicated by ascites, esophageal varices and hepatic encephalopathy in the past  Home regimen: encouraged patient to use medications as they were prescribed  Lactulose tid   Only using daily   Lasix 20 mg alternating with 40 mg   Patient only using prn   Ordered 20 mg daily while inpatient   Spironolactone 50 mg bid   Patient only using daily  MELD-Na: 11     * Osteomyelitis of second toe of right foot (HCC)  Assessment & Plan  Known osteomyelitis of second toe of right foot. Follows with Dr Baldwin outpatient. Seen on 8/30 and encouraged to come to the hospital for abx and amputation.   Not meeting SIRS   CRP WNL  Plan:   MRI foot- Ulceration distal second toe with  osteomyelitis of the second toe distal phalanx.Status post amputation of the great toe distal phalanx. Mild marrow edema seen within the distal aspect of the great toe proximal phalanx without confluent decreased T1 marrow signal to definitively suggest osteomyelitis  IV abx (Cef + Vanc)  Plan for surgery today  Patient unable to care for self post surgery. Consult CM  Podiatry following, appreciate recommendations               VTE Pharmacologic Prophylaxis: VTE Score: 3 Moderate Risk (Score 3-4) - Pharmacological DVT Prophylaxis Ordered: heparin.    Mobility:   Basic Mobility Inpatient Raw Score: 20  JH-HLM Goal: 6: Walk 10 steps or more  JH-HLM Achieved: 6: Walk 10 steps or more  JH-HLM Goal achieved. Continue to encourage appropriate mobility.    Patient Centered Rounds: I performed bedside rounds with nursing staff today.   Discussions with Specialists or Other Care Team Provider: yes    Education and Discussions with Family / Patient:  yes.     Total Time Spent on Date of Encounter in care of patient: 39 mins. This time was spent on one or more of the following: performing physical exam; counseling and coordination of care; obtaining or reviewing history; documenting in the medical record; reviewing/ordering tests, medications or procedures; communicating with other healthcare professionals and discussing with patient's family/caregivers.    Current Length of Stay: 2 day(s)  Current Patient Status: Inpatient   Certification Statement: 48-72 hours  Discharge Plan: 48-72 hrs     Code Status: Level 1 - Full Code    Subjective:   Seen and examined at bedside  Awake and alert  Complaining of chronic pain  Vitals are stable    Objective:     Vitals:   Temp (24hrs), Av.7 °F (35.9 °C), Min:96.6 °F (35.9 °C), Max:96.8 °F (36 °C)    Temp:  [96.6 °F (35.9 °C)-96.8 °F (36 °C)] 96.6 °F (35.9 °C)  HR:  [58-70] 58  BP: ()/(39-66) 96/39  SpO2:  [91 %] 91 %  Body mass index is 21.61 kg/m².     Input and Output  Summary (last 24 hours):     Intake/Output Summary (Last 24 hours) at 9/3/2024 1411  Last data filed at 9/3/2024 0822  Gross per 24 hour   Intake 360 ml   Output --   Net 360 ml       Physical Exam:   Physical Exam  Vitals reviewed.   Constitutional:       Comments: Malnourished and frail   HENT:      Head: Atraumatic.      Mouth/Throat:      Mouth: Mucous membranes are dry.   Cardiovascular:      Rate and Rhythm: Normal rate.      Heart sounds: Normal heart sounds.   Pulmonary:      Effort: No respiratory distress.   Abdominal:      General: Bowel sounds are normal.      Tenderness: There is no abdominal tenderness.   Musculoskeletal:         General: Tenderness present.   Skin:     General: Skin is dry.      Findings: Bruising and lesion present.   Neurological:      Mental Status: She is alert. Mental status is at baseline.   Psychiatric:         Mood and Affect: Mood normal.          Additional Data:     Labs:  Results from last 7 days   Lab Units 09/03/24  0506   WBC Thousand/uL 4.23*   HEMOGLOBIN g/dL 11.9   HEMATOCRIT % 36.2   PLATELETS Thousands/uL 111*   SEGS PCT % 48   LYMPHO PCT % 36   MONO PCT % 10   EOS PCT % 5     Results from last 7 days   Lab Units 09/03/24  0506 09/02/24  0503 09/01/24 2011   SODIUM mmol/L 136   < > 138   POTASSIUM mmol/L 4.7   < > 4.4   CHLORIDE mmol/L 102   < > 99   CO2 mmol/L 32   < > 35*   BUN mg/dL 18   < > 12   CREATININE mg/dL 0.77   < > 0.93   ANION GAP mmol/L 2*   < > 4   CALCIUM mg/dL 9.9   < > 10.0   ALBUMIN g/dL  --   --  3.3*   TOTAL BILIRUBIN mg/dL  --   --  1.04*   ALK PHOS U/L  --   --  121*   ALT U/L  --   --  17   AST U/L  --   --  38   GLUCOSE RANDOM mg/dL 108   < > 95    < > = values in this interval not displayed.     Results from last 7 days   Lab Units 09/01/24 2011   INR  1.25*             Results from last 7 days   Lab Units 09/01/24 2011   LACTIC ACID mmol/L 1.2   PROCALCITONIN ng/ml <0.05       Lines/Drains:  Invasive Devices       Peripheral  Intravenous Line  Duration             Peripheral IV 09/01/24 Dorsal (posterior);Right Forearm 1 day                          Imaging: Reviewed radiology reports from this admission including: MRI foot    Recent Cultures (last 7 days):         Last 24 Hours Medication List:   Current Facility-Administered Medications   Medication Dose Route Frequency Provider Last Rate    [Transfer Hold] acetaminophen  650 mg Oral Q6H PRN Sania Ag PA-C      [Transfer Hold] albuterol  2 puff Inhalation Q6H PRN Sania Ag PA-C      [Transfer Hold] ALPRAZolam  0.5 mg Oral BID PRN Sania Ag PA-C      [Transfer Hold] cefepime  2,000 mg Intravenous Q12H Sania Ag PA-C 2,000 mg (09/03/24 0822)    [Transfer Hold] Fluticasone Furoate-Vilanterol  1 puff Inhalation Daily Sania Ag PA-C      [Transfer Hold] folic acid  1 mg Oral Daily Sania Ag PA-C      [Transfer Hold] gabapentin  300 mg Oral BID Sania Ag PA-C      [Transfer Hold] lactulose  20 g Oral TID Sania Ag PA-C      [Transfer Hold] melatonin  6 mg Oral HS Sania Ag PA-C      [Transfer Hold] metoprolol tartrate  12.5 mg Oral Daily Cristina Ford MD      [Transfer Hold] pantoprazole  40 mg Oral Daily Before Breakfast Sania Ag PA-C      [Transfer Hold] spironolactone  50 mg Oral Daily Cristina Ford MD      [Transfer Hold] thiamine  100 mg Oral Daily Sania Ag PA-C      [Transfer Hold] vancomycin  1,250 mg Intravenous Q24H Sania Ag PA-C          Today, Patient Was Seen By: Cristina Ford MD    **Please Note: This note may have been constructed using a voice recognition system.**

## 2024-09-03 NOTE — ANESTHESIA PREPROCEDURE EVALUATION
Procedure:  AMPUTATION RIGHT 2ND TOE (Right: Toe)    Relevant Problems   ANESTHESIA (within normal limits)  Recent toe amp 6/2024 - no issues with anesthesia - will plan to repeat similar      CARDIO   (+) Esophageal varices (HCC)      GI/HEPATIC  Denies nausea/vomiting, NPO appropriate   (+) Alcoholic cirrhosis (HCC)      HEMATOLOGY   (+) Thrombocytopenia (HCC)      NEURO/PSYCH   (+) CVA (cerebral vascular accident) (HCC)   (+) Generalized anxiety disorder   (+) Major depressive disorder, recurrent episode, moderate (HCC)      PULMONARY   (+) COPD (chronic obstructive pulmonary disease) (HCC)   (-) URI (upper respiratory infection)      Behavioral Health   (+) Alcohol abuse (Hx of)   (+) Tobacco use disorder      Neurology/Sleep   (+) Metabolic encephalopathy      Other   (+) Osteomyelitis of second toe of right foot (HCC)      Physical Exam    Airway    Mallampati score: II  TM Distance: >3 FB       Dental   No notable dental hx     Cardiovascular      Pulmonary      Other Findings  post-pubertal.    Lab Results   Component Value Date    WBC 4.23 (L) 09/03/2024    HGB 11.9 09/03/2024     (L) 09/03/2024     Lab Results   Component Value Date    SODIUM 136 09/03/2024    K 4.7 09/03/2024    BUN 18 09/03/2024    CREATININE 0.77 09/03/2024    EGFR 79 09/03/2024     Lab Results   Component Value Date    PTT 30 09/01/2024      Lab Results   Component Value Date    INR 1.25 (H) 09/01/2024     Anesthesia Plan  ASA Score- 3     Anesthesia Type- IV sedation with anesthesia with ASA Monitors.         Additional Monitors:     Airway Plan:            Plan Factors-Exercise tolerance (METS): >4 METS.    Chart reviewed.   Existing labs reviewed. Patient summary reviewed.    Patient is not a current smoker.              Induction- intravenous.    Postoperative Plan-     Perioperative Resuscitation Plan - Level 1 - Full Code.       Informed Consent- Anesthetic plan and risks discussed with patient.  I personally reviewed this  patient with the CRNA. Discussed and agreed on the Anesthesia Plan with the CRNA..

## 2024-09-03 NOTE — ASSESSMENT & PLAN NOTE
Plt 118  Baseline about    Thrombocytopenia secondary to cirrhosis   Continue to monitor   Recent Labs     09/03/24  0506 09/04/24  0517 09/05/24  0502   * 109* 104*

## 2024-09-03 NOTE — ASSESSMENT & PLAN NOTE
History of alcoholic cirrhosis complicated by ascites, esophageal varices and hepatic encephalopathy in the past  Home regimen: encouraged patient to use medications as they were prescribed  Lactulose tid   Only using daily   Lasix 20 mg alternating with 40 mg   Patient only using prn   Spironolactone 50 mg bid   Patient only using daily  MELD-Na: 11

## 2024-09-03 NOTE — PLAN OF CARE
Problem: PAIN - ADULT  Goal: Verbalizes/displays adequate comfort level or baseline comfort level  Description: Interventions:  - Encourage patient to monitor pain and request assistance  - Assess pain using appropriate pain scale  - Administer analgesics based on type and severity of pain and evaluate response  - Implement non-pharmacological measures as appropriate and evaluate response  - Consider cultural and social influences on pain and pain management  - Notify physician/advanced practitioner if interventions unsuccessful or patient reports new pain  Outcome: Progressing     Problem: INFECTION - ADULT  Goal: Absence or prevention of progression during hospitalization  Description: INTERVENTIONS:  - Assess and monitor for signs and symptoms of infection  - Monitor lab/diagnostic results  - Monitor all insertion sites, i.e. indwelling lines, tubes, and drains  - Monitor endotracheal if appropriate and nasal secretions for changes in amount and color  - [place] appropriate cooling/warming therapies per order  - Administer medications as ordered  - Instruct and encourage patient and family to use good hand hygiene technique  - Identify and instruct in appropriate isolation precautions for identified infection/condition  Outcome: Progressing  Goal: Absence of fever/infection during neutropenic period  Description: INTERVENTIONS:  - Monitor WBC    Outcome: Progressing     Problem: SAFETY ADULT  Goal: Maintain or return to baseline ADL function  Description: INTERVENTIONS:  -  Assess patient's ability to carry out ADLs; assess patient's baseline for ADL function and identify physical deficits which impact ability to perform ADLs (bathing, care of mouth/teeth, toileting, grooming, dressing, etc.)  - Assess/evaluate cause of self-care deficits   - Assess range of motion  - Assess patient's mobility; develop plan if impaired  - Assess patient's need for assistive devices and provide as appropriate  - Encourage  maximum independence but intervene and supervise when necessary  - Involve family in performance of ADLs  - Assess for home care needs following discharge   - Consider OT consult to assist with ADL evaluation and planning for discharge  - Provide patient education as appropriate  Outcome: Progressing     Problem: DISCHARGE PLANNING  Goal: Discharge to home or other facility with appropriate resources  Description: INTERVENTIONS:  - Identify barriers to discharge w/patient and caregiver  - Arrange for needed discharge resources and transportation as appropriate  - Identify discharge learning needs (meds, wound care, etc.)  - Arrange for interpretive services to assist at discharge as needed  - Refer to Case Management Department for coordinating discharge planning if the patient needs post-hospital services based on physician/advanced practitioner order or complex needs related to functional status, cognitive ability, or social support system  Outcome: Progressing     Problem: Knowledge Deficit  Goal: Patient/family/caregiver demonstrates understanding of disease process, treatment plan, medications, and discharge instructions  Description: Complete learning assessment and assess knowledge base.  Interventions:  - Provide teaching at level of understanding  - Provide teaching via preferred learning methods  Outcome: Progressing     Problem: Nutrition/Hydration-ADULT  Goal: Nutrient/Hydration intake appropriate for improving, restoring or maintaining nutritional needs  Description: Monitor and assess patient's nutrition/hydration status for malnutrition. Collaborate with interdisciplinary team and initiate plan and interventions as ordered.  Monitor patient's weight and dietary intake as ordered or per policy. Utilize nutrition screening tool and intervene as necessary. Determine patient's food preferences and provide high-protein, high-caloric foods as appropriate.     INTERVENTIONS:  - Monitor oral intake, urinary  output, labs, and treatment plans  - Assess nutrition and hydration status and recommend course of action  - Evaluate amount of meals eaten  - Assist patient with eating if necessary   - Allow adequate time for meals  - Recommend/ encourage appropriate diets, oral nutritional supplements, and vitamin/mineral supplements  - Order, calculate, and assess calorie counts as needed  - Recommend, monitor, and adjust tube feedings and TPN/PPN based on assessed needs  - Assess need for intravenous fluids  - Provide specific nutrition/hydration education as appropriate  - Include patient/family/caregiver in decisions related to nutrition  Outcome: Progressing

## 2024-09-03 NOTE — ASSESSMENT & PLAN NOTE
Known osteomyelitis of second toe of right foot. Follows with Dr Baldwin outpatient. Seen on 8/30 and encouraged to come to the hospital for abx and amputation.   Not meeting SIRS   CRP WNL  Plan:   MRI foot- Ulceration distal second toe with osteomyelitis of the second toe distal phalanx.Status post amputation of the great toe distal phalanx. Mild marrow edema seen within the distal aspect of the great toe proximal phalanx without confluent decreased T1 marrow signal to definitively suggest osteomyelitis  IV abx (Cef + Vanc), discharged on p.o. doxycycline 100 mg twice daily for total 7 days  Status post right second toe partial amputation by podiatry on 9/3  PT/OT-recommending rehab, patient agreeable  Outpatient podiatry follow-up

## 2024-09-04 ENCOUNTER — APPOINTMENT (INPATIENT)
Dept: RADIOLOGY | Facility: HOSPITAL | Age: 69
DRG: 504 | End: 2024-09-04
Payer: MEDICARE

## 2024-09-04 LAB
ANION GAP SERPL CALCULATED.3IONS-SCNC: 1 MMOL/L (ref 4–13)
BASOPHILS # BLD AUTO: 0.02 THOUSANDS/ÂΜL (ref 0–0.1)
BASOPHILS NFR BLD AUTO: 1 % (ref 0–1)
BUN SERPL-MCNC: 19 MG/DL (ref 5–25)
CALCIUM SERPL-MCNC: 10.1 MG/DL (ref 8.4–10.2)
CHLORIDE SERPL-SCNC: 104 MMOL/L (ref 96–108)
CO2 SERPL-SCNC: 32 MMOL/L (ref 21–32)
CREAT SERPL-MCNC: 0.82 MG/DL (ref 0.6–1.3)
EOSINOPHIL # BLD AUTO: 0.18 THOUSAND/ÂΜL (ref 0–0.61)
EOSINOPHIL NFR BLD AUTO: 5 % (ref 0–6)
ERYTHROCYTE [DISTWIDTH] IN BLOOD BY AUTOMATED COUNT: 13.2 % (ref 11.6–15.1)
GFR SERPL CREATININE-BSD FRML MDRD: 73 ML/MIN/1.73SQ M
GLUCOSE SERPL-MCNC: 114 MG/DL (ref 65–140)
HCT VFR BLD AUTO: 36.8 % (ref 34.8–46.1)
HGB BLD-MCNC: 12.3 G/DL (ref 11.5–15.4)
IMM GRANULOCYTES # BLD AUTO: 0.01 THOUSAND/UL (ref 0–0.2)
IMM GRANULOCYTES NFR BLD AUTO: 0 % (ref 0–2)
LYMPHOCYTES # BLD AUTO: 1.23 THOUSANDS/ÂΜL (ref 0.6–4.47)
LYMPHOCYTES NFR BLD AUTO: 35 % (ref 14–44)
MAGNESIUM SERPL-MCNC: 1.5 MG/DL (ref 1.9–2.7)
MCH RBC QN AUTO: 32.5 PG (ref 26.8–34.3)
MCHC RBC AUTO-ENTMCNC: 33.4 G/DL (ref 31.4–37.4)
MCV RBC AUTO: 97 FL (ref 82–98)
MONOCYTES # BLD AUTO: 0.44 THOUSAND/ÂΜL (ref 0.17–1.22)
MONOCYTES NFR BLD AUTO: 12 % (ref 4–12)
NEUTROPHILS # BLD AUTO: 1.69 THOUSANDS/ÂΜL (ref 1.85–7.62)
NEUTS SEG NFR BLD AUTO: 47 % (ref 43–75)
NRBC BLD AUTO-RTO: 0 /100 WBCS
PLATELET # BLD AUTO: 109 THOUSANDS/UL (ref 149–390)
PMV BLD AUTO: 10.5 FL (ref 8.9–12.7)
POTASSIUM SERPL-SCNC: 4.4 MMOL/L (ref 3.5–5.3)
RBC # BLD AUTO: 3.78 MILLION/UL (ref 3.81–5.12)
SODIUM SERPL-SCNC: 137 MMOL/L (ref 135–147)
WBC # BLD AUTO: 3.57 THOUSAND/UL (ref 4.31–10.16)

## 2024-09-04 PROCEDURE — 73610 X-RAY EXAM OF ANKLE: CPT

## 2024-09-04 PROCEDURE — 85025 COMPLETE CBC W/AUTO DIFF WBC: CPT

## 2024-09-04 PROCEDURE — 97167 OT EVAL HIGH COMPLEX 60 MIN: CPT

## 2024-09-04 PROCEDURE — 83735 ASSAY OF MAGNESIUM: CPT | Performed by: INTERNAL MEDICINE

## 2024-09-04 PROCEDURE — 97163 PT EVAL HIGH COMPLEX 45 MIN: CPT

## 2024-09-04 PROCEDURE — 99232 SBSQ HOSP IP/OBS MODERATE 35: CPT | Performed by: PODIATRIST

## 2024-09-04 PROCEDURE — 97760 ORTHOTIC MGMT&TRAING 1ST ENC: CPT

## 2024-09-04 PROCEDURE — 99232 SBSQ HOSP IP/OBS MODERATE 35: CPT | Performed by: INTERNAL MEDICINE

## 2024-09-04 PROCEDURE — 97116 GAIT TRAINING THERAPY: CPT

## 2024-09-04 PROCEDURE — 80048 BASIC METABOLIC PNL TOTAL CA: CPT

## 2024-09-04 RX ORDER — MAGNESIUM SULFATE HEPTAHYDRATE 40 MG/ML
2 INJECTION, SOLUTION INTRAVENOUS ONCE
Status: COMPLETED | OUTPATIENT
Start: 2024-09-04 | End: 2024-09-04

## 2024-09-04 RX ADMIN — FOLIC ACID 1 MG: 1 TABLET ORAL at 08:51

## 2024-09-04 RX ADMIN — MAGNESIUM SULFATE HEPTAHYDRATE 2 G: 2 INJECTION, SOLUTION INTRAVENOUS at 08:40

## 2024-09-04 RX ADMIN — OXYCODONE HYDROCHLORIDE AND ACETAMINOPHEN 1 TABLET: 5; 325 TABLET ORAL at 10:14

## 2024-09-04 RX ADMIN — VANCOMYCIN HYDROCHLORIDE 1250 MG: 5 INJECTION, POWDER, LYOPHILIZED, FOR SOLUTION INTRAVENOUS at 21:37

## 2024-09-04 RX ADMIN — Medication 100 MG: at 08:41

## 2024-09-04 RX ADMIN — GABAPENTIN 300 MG: 300 CAPSULE ORAL at 08:41

## 2024-09-04 RX ADMIN — GABAPENTIN 300 MG: 300 CAPSULE ORAL at 21:36

## 2024-09-04 RX ADMIN — CEFEPIME HYDROCHLORIDE 2000 MG: 2 INJECTION, SOLUTION INTRAVENOUS at 23:33

## 2024-09-04 RX ADMIN — Medication 6 MG: at 21:36

## 2024-09-04 RX ADMIN — FLUTICASONE FUROATE AND VILANTEROL TRIFENATATE 1 PUFF: 100; 25 POWDER RESPIRATORY (INHALATION) at 08:51

## 2024-09-04 RX ADMIN — HYDROMORPHONE HYDROCHLORIDE 0.5 MG: 1 INJECTION, SOLUTION INTRAMUSCULAR; INTRAVENOUS; SUBCUTANEOUS at 12:21

## 2024-09-04 RX ADMIN — CEFEPIME HYDROCHLORIDE 2000 MG: 2 INJECTION, SOLUTION INTRAVENOUS at 11:13

## 2024-09-04 RX ADMIN — PANTOPRAZOLE SODIUM 40 MG: 40 TABLET, DELAYED RELEASE ORAL at 05:25

## 2024-09-04 RX ADMIN — OXYCODONE HYDROCHLORIDE AND ACETAMINOPHEN 1 TABLET: 5; 325 TABLET ORAL at 21:44

## 2024-09-04 RX ADMIN — OXYCODONE HYDROCHLORIDE AND ACETAMINOPHEN 1 TABLET: 5; 325 TABLET ORAL at 05:25

## 2024-09-04 NOTE — PLAN OF CARE
Problem: PHYSICAL THERAPY ADULT  Goal: Performs mobility at highest level of function for planned discharge setting.  See evaluation for individualized goals.  Description: Treatment/Interventions: Functional transfer training, LE strengthening/ROM, Elevations, Therapeutic exercise, Endurance training, Cognitive reorientation, Patient/family training, Equipment eval/education, Bed mobility, Gait training  Equipment Recommended: (S) Walker       See flowsheet documentation for full assessment, interventions and recommendations.  9/4/2024 1221 by Yashira Mejia PT  Note: Prognosis: Fair  Problem List: Decreased strength, Decreased endurance, Decreased mobility, Impaired balance, Impaired judgement, Decreased safety awareness, Orthopedic restrictions, Pain, Decreased skin integrity  Assessment: Finn Oleary is a 68 y.o. Female who presents to [facility] on 9/1/2024 from home w/ c/o increased pain and swelling of R foot and diagnosis of osteomyelitis of 2nd toe of R foot, now POD1 s/p toe amputation. Orders for PT eval and treat received, w/ activity orders of PWB R LE. Pt presents w/ comorbidities of hx of CVA w/ L sided deficits, alcoholic cirrhosis, COPD, TOO, MDD. At baseline, pt mobilizes modified I w/ cane, and reports + falls in the last 6 months. Upon evaluation, pt presents w/ the following deficits: weakness, impaired balance, decreased endurance, and pain limiting functional mobility. Upon eval, pt requires superviison for bed mobility, ,min A x 1 for transfers, and mod A x 1 for gait. Based on this PT evaluation today, patient's discharge recommendation is for Level II - Moderate Rehab Resource Intensity. During this admission, pt would benefit from continued skilled inpatient PT in the acute care setting in order to address the abovementioned deficits to maximize function and mobility before DC from acute care.  Barriers to Discharge: Inaccessible home environment, Decreased caregiver support     Rehab  Resource Intensity Level, PT: II (Moderate Resource Intensity)    See flowsheet documentation for full assessment.

## 2024-09-04 NOTE — PLAN OF CARE
Problem: PAIN - ADULT  Goal: Verbalizes/displays adequate comfort level or baseline comfort level  Description: Interventions:  - Encourage patient to monitor pain and request assistance  - Assess pain using appropriate pain scale  - Administer analgesics based on type and severity of pain and evaluate response  - Implement non-pharmacological measures as appropriate and evaluate response  - Consider cultural and social influences on pain and pain management  - Notify physician/advanced practitioner if interventions unsuccessful or patient reports new pain  Outcome: Progressing     Problem: INFECTION - ADULT  Goal: Absence or prevention of progression during hospitalization  Description: INTERVENTIONS:  - Assess and monitor for signs and symptoms of infection  - Monitor lab/diagnostic results  - Monitor all insertion sites, i.e. indwelling lines, tubes, and drains  - Monitor endotracheal if appropriate and nasal secretions for changes in amount and color  - [place] appropriate cooling/warming therapies per order  - Administer medications as ordered  - Instruct and encourage patient and family to use good hand hygiene technique  - Identify and instruct in appropriate isolation precautions for identified infection/condition  Outcome: Progressing  Goal: Absence of fever/infection during neutropenic period  Description: INTERVENTIONS:  - Monitor WBC    Outcome: Progressing     Problem: SAFETY ADULT  Goal: Patient will remain free of falls  Description: INTERVENTIONS:  - Educate patient/family on patient safety including physical limitations  - Instruct patient to call for assistance with activity   - Consult OT/PT to assist with strengthening/mobility   - Keep Call bell within reach  - Keep bed low and locked with side rails adjusted as appropriate  - Keep care items and personal belongings within reach  - Initiate and maintain comfort rounds  - Make Fall Risk Sign visible to staff  - Offer Toileting every 2 Hours,  in advance of need  - Initiate/Maintain alarm  - Obtain necessary fall risk management equipment:  - Apply yellow socks and bracelet for high fall risk patients  - Consider moving patient to room near nurses station  Outcome: Progressing  Goal: Maintain or return to baseline ADL function  Description: INTERVENTIONS:  -  Assess patient's ability to carry out ADLs; assess patient's baseline for ADL function and identify physical deficits which impact ability to perform ADLs (bathing, care of mouth/teeth, toileting, grooming, dressing, etc.)  - Assess/evaluate cause of self-care deficits   - Assess range of motion  - Assess patient's mobility; develop plan if impaired  - Assess patient's need for assistive devices and provide as appropriate  - Encourage maximum independence but intervene and supervise when necessary  - Involve family in performance of ADLs  - Assess for home care needs following discharge   - Consider OT consult to assist with ADL evaluation and planning for discharge  - Provide patient education as appropriate  Outcome: Progressing  Goal: Maintains/Returns to pre admission functional level  Description: INTERVENTIONS:  - Perform AM-PAC 6 Click Basic Mobility/ Daily Activity assessment daily.  - Set and communicate daily mobility goal to care team and patient/family/caregiver.   - Collaborate with rehabilitation services on mobility goals if consulted  - Perform Range of Motion 3 times a day.  - Reposition patient every 3 hours.  - Dangle patient 3 times a day  - Stand patient 3 times a day  - Ambulate patient 3 times a day  - Out of bed to chair 3 times a day   - Out of bed for meals 3 times a day  - Out of bed for toileting  - Record patient progress and toleration of activity level   Outcome: Progressing     Problem: DISCHARGE PLANNING  Goal: Discharge to home or other facility with appropriate resources  Description: INTERVENTIONS:  - Identify barriers to discharge w/patient and caregiver  -  Arrange for needed discharge resources and transportation as appropriate  - Identify discharge learning needs (meds, wound care, etc.)  - Arrange for interpretive services to assist at discharge as needed  - Refer to Case Management Department for coordinating discharge planning if the patient needs post-hospital services based on physician/advanced practitioner order or complex needs related to functional status, cognitive ability, or social support system  Outcome: Progressing     Problem: Knowledge Deficit  Goal: Patient/family/caregiver demonstrates understanding of disease process, treatment plan, medications, and discharge instructions  Description: Complete learning assessment and assess knowledge base.  Interventions:  - Provide teaching at level of understanding  - Provide teaching via preferred learning methods  Outcome: Progressing     Problem: Nutrition/Hydration-ADULT  Goal: Nutrient/Hydration intake appropriate for improving, restoring or maintaining nutritional needs  Description: Monitor and assess patient's nutrition/hydration status for malnutrition. Collaborate with interdisciplinary team and initiate plan and interventions as ordered.  Monitor patient's weight and dietary intake as ordered or per policy. Utilize nutrition screening tool and intervene as necessary. Determine patient's food preferences and provide high-protein, high-caloric foods as appropriate.     INTERVENTIONS:  - Monitor oral intake, urinary output, labs, and treatment plans  - Assess nutrition and hydration status and recommend course of action  - Evaluate amount of meals eaten  - Assist patient with eating if necessary   - Allow adequate time for meals  - Recommend/ encourage appropriate diets, oral nutritional supplements, and vitamin/mineral supplements  - Order, calculate, and assess calorie counts as needed  - Recommend, monitor, and adjust tube feedings and TPN/PPN based on assessed needs  - Assess need for intravenous  fluids  - Provide specific nutrition/hydration education as appropriate  - Include patient/family/caregiver in decisions related to nutrition  Outcome: Progressing     Problem: Potential for Falls  Goal: Patient will remain free of falls  Description: INTERVENTIONS:  - Educate patient/family on patient safety including physical limitations  - Instruct patient to call for assistance with activity   - Consult OT/PT to assist with strengthening/mobility   - Keep Call bell within reach  - Keep bed low and locked with side rails adjusted as appropriate  - Keep care items and personal belongings within reach  - Initiate and maintain comfort rounds  - Make Fall Risk Sign visible to staff  - Offer Toileting every 2 Hours, in advance of need  - Initiate/Maintain alarm  - Obtain necessary fall risk management equipment  - Apply yellow socks and bracelet for high fall risk patients  - Consider moving patient to room near nurses station  Outcome: Progressing

## 2024-09-04 NOTE — PHYSICAL THERAPY NOTE
PHYSICAL THERAPY EVALUATION NOTE      Patient Name: Finn Oleary  Today's Date: 2024    AGE:   68 y.o.  Mrn:   [id number]  ADMIT DX:  Toe pain [M79.676]  Osteomyelitis of second toe of right foot (HCC) [M86.9]    Past Medical History:   Diagnosis Date    Anemia     Anxiety     Ascites     Cervical cancer (HCC)     Chronic pain     Cirrhosis (HCC)     CVA (cerebral vascular accident) (HCC)     Depression     Fibromyalgia     Hepatic encephalopathy (HCC)     Opioid dependence in remission (HCC) 2022     Length Of Stay: 3  PHYSICAL THERAPY EVALUATION :   Patient's identity confirmed via 2 patient identifiers (full name and ) at start of session       24 0914   PT Last Visit   PT Visit Date 24   Note Type   Note type Evaluation;Orthotic Management/Training   Type of Brace   Brace Applied Darco Wedge Shoe (Toe Unloading)  (Size M)   Patient Position When Brace Applied Seated   Bracing Recommendations Recommendation (comment)   Education   Education Provided Yes   Pain Assessment   Pain Assessment Tool 0-10   Pain Score 8   Pain Location/Orientation Orientation: Right;Location: Foot   Pain Onset/Description Frequency: Constant/Continuous   Patient's Stated Pain Goal No pain   Hospital Pain Intervention(s) Repositioned;Ambulation/increased activity;Emotional support   Restrictions/Precautions   Weight Bearing Precautions Per Order Yes   RLE Weight Bearing Per Order PWB  (in Darco wedge)   LLE Weight Bearing Per Order   (reportedly WBAT in a CAM boot since July)   Braces or Orthoses CAM Boot  (CAM boot LLE from ? fx in July)   Other Precautions Cognitive;Chair Alarm;Bed Alarm;WBS;Pain;Fall Risk   Home Living   Type of Home Apartment   Home Layout One level;Stairs to enter with rails  (5 MOOSE)   Home Equipment Walker;Cane;Quad cane   Additional Comments Pt reports using quad cane   Prior Function   Level of [place]  Independent with ADLs;Independent with functional mobility   Lives With (S)  Alone   IADLs Family/Friend/Other provides transportation;Independent with meal prep;Independent with medication management   Falls in the last 6 months 1 to 4   General   Family/Caregiver Present No   Cognition   Overall Cognitive Status Impaired  (decreased insight)   Arousal/Participation Alert   Orientation Level Oriented X4   Following Commands Follows one step commands with increased time or repetition   Comments Pt w/ some self-limiting behavior   RLE Assessment   RLE Assessment WFL   Strength RLE   RLE Overall Strength 4-/5   LLE Assessment   LLE Assessment WFL   Strength LLE   LLE Overall Strength 3+/5   Bed Mobility   Supine to Sit 5  Supervision   Additional items Assist x 1   Transfers   Sit to Stand 4  Minimal assistance   Additional items Assist x 1   Stand to Sit 4  Minimal assistance   Additional items Assist x 1   Ambulation/Elevation   Gait pattern Improper Weight shift;Antalgic;Short stride   Gait Assistance 3  Moderate assist   Additional items Assist x 1   Assistive Device Small base quad cane  (RUE)   Distance 5 ft   Ambulation/Elevation Additional Comments see tx note below for ambulation w/ RW   Balance   Static Sitting Fair   Static Standing Poor +   Ambulatory Poor  (w/ cane)   Activity Tolerance   Activity Tolerance Patient limited by pain   Medical Staff Made Aware CB Quintana, Podiatrist Dr. Baldwin (re: L ankle)   Nurse Made Aware JESÚS Goldsmith   Assessment   Prognosis Fair   Problem List Decreased strength;Decreased endurance;Decreased mobility;Impaired balance;Impaired judgement;Decreased safety awareness;Orthopedic restrictions;Pain;Decreased skin integrity   Assessment Finn Oleary is a 68 y.o. Female who presents to [facility] on 9/1/2024 from home w/ c/o increased pain and swelling of R foot and diagnosis of osteomyelitis of 2nd toe of R foot, now POD1 s/p toe amputation. Orders for PT eval and treat received, w/  activity orders of PWB R LE. Pt presents w/ comorbidities of hx of CVA w/ L sided deficits, alcoholic cirrhosis, COPD, TOO, MDD. At baseline, pt mobilizes modified I w/ cane, and reports + falls in the last 6 months. Upon evaluation, pt presents w/ the following deficits: weakness, impaired balance, decreased endurance, and pain limiting functional mobility. Upon eval, pt requires superviison for bed mobility, ,min A x 1 for transfers, and mod A x 1 for gait. Based on this PT evaluation today, patient's discharge recommendation is for Level II - Moderate Rehab Resource Intensity. During this admission, pt would benefit from continued skilled inpatient PT in the acute care setting in order to address the abovementioned deficits to maximize function and mobility before DC from acute care.   Barriers to Discharge Inaccessible home environment;Decreased caregiver support   Goals   Patient Goals to have less pain   STG Expiration Date 09/14/24   Short Term Goal #1 Patient will: Perform all bed mobility tasks modified independent to improve pt's independence w/ repositioning for decrease risk of skin breakdown, Perform all transfers modified independent consistently from various height surfaces in order to improve I w/ engagement w/ real-world environments/situations, Ambulate at least 100 ft. with roller walker w/ supervision w/o LOB to facilitate return and engagement w/ previous living environment, and Navigate 2 stairs w/ supervision with unilateral handrail to either improve independence w/ entering home and/or so patient can fully access living areas in home   PT Treatment Day 0   Plan   Treatment/Interventions Functional transfer training;LE strengthening/ROM;Elevations;Therapeutic exercise;Endurance training;Cognitive reorientation;Patient/family training;Equipment eval/education;Bed mobility;Gait training   PT Frequency 3-5x/wk   Discharge Recommendation   Rehab Resource Intensity Level, PT II (Moderate Resource  "Intensity)   Equipment Recommended (S)  Walker   Additional Comments Recommended to patient that she should be using RW at all times   AM-PAC Basic Mobility Inpatient   Turning in Flat Bed Without Bedrails 4   Lying on Back to Sitting on Edge of Flat Bed Without Bedrails 3   Moving Bed to Chair 3   Standing Up From Chair Using Arms 3   Walk in Room 2   Climb 3-5 Stairs With Railing 2   Basic Mobility Inpatient Raw Score 17   Basic Mobility Standardized Score 39.67   [facility] Highest Level Of Mobility   [facility] Goal 5: Stand one or more mins   [facility] Achieved 6: Walk 10 steps or more   Additional Treatment Session   Start Time 0922   End Time 0930   Treatment Assessment Patient provided w/ RW to see if this could improve functional mobility. She keeps repeating that she \"doesn't do well with that (RW)\". However when ambulating 10 ft w/ RW, she requires min A x 1 and demonstrates improve functional mobility. Provided education and encouragement to continue to use RW as she does better with that then the quad cane. Pt returned to recliner chair at end of session, as xray at bedside to image the L ankle.   Equipment Use RW   Additional Treatment Day 1   End of Consult   Patient Position at End of Consult Bedside chair;Bed/Chair alarm activated;All needs within reach     Pt would benefit from skilled inpatient PT during this admission in order to facilitate progress towards goals to maximize functional independence    Yashira Mejia PT, DPT       "

## 2024-09-04 NOTE — PROGRESS NOTES
[facility] Podiatry - Progress Note  Patient: Finn Oleary 68 y.o. female   MRN: [id number]  PCP: Scot Brink MD  Unit/Bed#: -01 Encounter: [id number]  Date Of Visit: 09/04/24    ASSESSMENT:    Finn Oleary is a 68 y.o. female with:    Status post day 1 partial second toe amputation right foot for osteomyelitis.  Left lateral ankle fracture  Alcoholic peripheral neuropathy  Moderate protein calorie malnutrition, COPD, thrombocytopenia, tobacco use disorder, alcoholic cirrhosis,    PLAN:    Post-surgical dressing was changed with Adaptic dry dressing second toe right foot.    Patient may heel touch weight-bear right foot with use of walker for ADLs, pivot and transfer, minimize weightbearing to only what is necessary.  Darco wedge shoe was ordered, PT orders were placed.  We appreciate PT recommendations for postacute care.  Left ankle with known fracture from July, x-ray was ordered and evaluated, compared with prior x-rays, I agree with radiology interpretation of stable appearance of cortical irregularity at the tip of the lateral malleolus, clinically patient does not have any pain on palpation to this area, we will transition her from cam boot to sneaker and weight-bear as tolerated on left.  Vitals signs stable. Patient afebrile with no leukocytosis. No erythema, edema, or lymphangitis noted either proximal or distal to the dressings.  Pain is well controlled. Continue current pain management regimen.  Patient is stable for discharge to skilled nursing from my perspective, dressing change orders were placed, case was discussed with primary team, I recommend doxycycline for oral antibiotics for completion of 7 days in total.  Rest of care per primary team.      SUBJECTIVE:     The patient was seen, evaluated, and assessed at bedside today. The patient was awake, alert, and in no acute distress. The patient reports mild pain at this time. Pain is well controlled with current pain management regimen.  "Patient reports normal appetite and bowel movement. Patient denies N/V/F/chills/SOB/CP.      OBJECTIVE:     Vitals:   /50 (BP Location: Right arm)   Pulse 60   Temp 97.5 °F (36.4 °C) (Temporal)   Resp (!) 10   Ht 5' 3\" (1.6 m)   Wt 55.3 kg (122 lb)   LMP  (LMP Unknown)   SpO2 95%   BMI 21.61 kg/m²     Temp (24hrs), Av.1 °F (36.2 °C), Min:96.6 °F (35.9 °C), Max:97.6 °F (36.4 °C)      Dressings on affected extremity: C/D/I    Physical Exam  Cardiovascular status at baseline. Neurological status at baseline. No erythema, edema, or lymphangitis noted from dressing. Lower extremity temperature WNL.    Right second toe with sutures intact, minimal serosanguineous drainage noted, incision is coapting well, skin edges are viable, no signs of dehiscence or infection noted.        Left lateral malleolus, no pain on palpation of malleolus, some tenderness along the peroneal tendon, no edema, ecchymosis noted.  MMT is 5 out of 5 bilateral.    Additional Data:     Labs:    Results from last 7 days   Lab Units 24  0517   WBC Thousand/uL 3.57*   HEMOGLOBIN g/dL 12.3   HEMATOCRIT % 36.8   PLATELETS Thousands/uL 109*   SEGS PCT % 47   LYMPHO PCT % 35   MONO PCT % 12   EOS PCT % 5     Results from last 7 days   Lab Units 24  0517 24  0503 24   POTASSIUM mmol/L 4.4   < > 4.4   CHLORIDE mmol/L 104   < > 99   CO2 mmol/L 32   < > 35*   BUN mg/dL 19   < > 12   CREATININE mg/dL 0.82   < > 0.93   CALCIUM mg/dL 10.1   < > 10.0   ALK PHOS U/L  --   --  121*   ALT U/L  --   --  17   AST U/L  --   --  38    < > = values in this interval not displayed.     Results from last 7 days   Lab Units 24   INR  1.25*       * I Have Reviewed All Lab Data Listed Above.    Recent Cultures (last 7 days):               Imaging: I have personally reviewed pertinent post-operative films in PACS:  EKG, Pathology, and Other Studies: I have personally reviewed pertinent reports.      ** Please Note: " "Portions of the record may have been created with voice recognition software. Occasional wrong word or \"sound a like\" substitutions may have occurred due to the inherent limitations of voice recognition software. Read the chart carefully and recognize, using context, where substitutions have occurred. **     "

## 2024-09-04 NOTE — OCCUPATIONAL THERAPY NOTE
Occupational Therapy Evaluation      Finn COLT Anirudh    9/4/2024    Principal Problem:    Osteomyelitis of second toe of right foot (HCC)  Active Problems:    Alcoholic cirrhosis (HCC)    Tobacco use disorder    Thrombocytopenia (HCC)    COPD (chronic obstructive pulmonary disease) (HCC)    Moderate protein-calorie malnutrition (HCC)    CVA (cerebral vascular accident) (HCC)      Past Medical History:   Diagnosis Date    Anemia     Anxiety     Ascites     Cervical cancer (HCC)     Chronic pain     Cirrhosis (HCC)     CVA (cerebral vascular accident) (HCC)     Depression     Fibromyalgia     Hepatic encephalopathy (HCC)     Opioid dependence in remission (HCC) 11/7/2022       Past Surgical History:   Procedure Laterality Date    COLONOSCOPY  07/14/2015    COLONOSCOPY  03/15/2013    Hemorrhoids    EGD  12/23/2014    HYSTERECTOMY      DE AMPUTATION TOE INTERPHALANGEAL JOINT Right 6/7/2024    Procedure: AMPUTATION RIGHT GREAT TOE;  Surgeon: Niru Baldwin DPM;  Location:  MAIN OR;  Service: Podiatry    DE AMPUTATION TOE INTERPHALANGEAL JOINT Right 9/3/2024    Procedure: AMPUTATION RIGHT 2ND TOE;  Surgeon: Niru Baldwin DPM;  Location: [facility];  Service: Podiatry        09/04/24 0913   OT Last Visit   OT Visit Date 09/04/24   Note Type   Note type Evaluation;Orthotic Management/Training   Pain Assessment   Pain Assessment Tool 0-10   Pain Score 8   Pain Location/Orientation Orientation: Right;Location: Foot   Hospital Pain Intervention(s) Repositioned;Ambulation/increased activity   Restrictions/Precautions   Weight Bearing Precautions Per Order Yes   RLE Weight Bearing Per Order PWB  (in Darco wedge)   LLE Weight Bearing Per Order WBAT  (CAM boot)   Braces or Orthoses Other (Comment)  (CAM & darco wedge)   Other Precautions Cognitive;Chair Alarm;Bed Alarm;WBS;Pain;Fall Risk   Home Living   Type of Home Apartment   Home Layout One level;Stairs to enter with rails  (5STE)   Home Equipment Walker;Cane   Prior  "Function   Level of [place] Independent with ADLs;Independent with functional mobility;Independent with IADLS   Lives With (S)  Alone   IADLs Family/Friend/Other provides transportation;Independent with meal prep;Independent with medication management  (uses [employer] transport)   Falls in the last 6 months 1 to 4   Subjective   Subjective \"I don't use a walker cause I don't like them\"   ADL   Eating Assistance 5  Supervision/Setup   Grooming Assistance 5  Supervision/Setup   UB Bathing Assistance 5  Supervision/Setup   LB Bathing Assistance 3  Moderate Assistance   UB Dressing Assistance 5  Supervision/Setup   LB Dressing Assistance 3  Moderate Assistance   Toileting Assistance  4  Minimal Assistance   Bed Mobility   Supine to Sit 5  Supervision   Additional items Assist x 1   Transfers   Sit to Stand 4  Minimal assistance   Additional items Assist x 1;Verbal cues;Impulsive;Increased time required   Stand to Sit 4  Minimal assistance   Additional items Assist x 1;Verbal cues;Impulsive;Increased time required   Stand pivot 4  Minimal assistance   Additional items Assist x 1;Verbal cues;Impulsive;Increased time required  (RW)   Functional Mobility   Functional Mobility 4  Minimal assistance   Additional Comments x1   Additional items Rolling walker   Activity Tolerance   Activity Tolerance Patient limited by fatigue;Patient limited by pain   Medical Staff Made Aware Dr Nicolasa Geller   Nurse Made Aware JESÚS BATISTA Assessment   RUE Assessment WFL   LUE Assessment   LUE Assessment WFL   Hand Function   Gross Motor Coordination Functional   Fine Motor Coordination Functional   Cognition   Overall Cognitive Status Impaired  (decreased insight)   Arousal/Participation Alert   Attention Attends with cues to redirect   Orientation Level Oriented X4   Memory Decreased recall of precautions   Following Commands Follows one step commands with increased time or repetition   Comments Self-limiting. Limited safety " awareness. Poor insight.   Assessment   Limitation Decreased ADL status;Decreased UE ROM;Decreased UE strength;Decreased Safe judgement during ADL;Decreased cognition;Decreased endurance;Decreased self-care trans;Decreased high-level ADLs   Prognosis Guarded   Assessment Pt is a 68 y.o. female seen for OT evaluation at [facility], admitted 9/1/2024 w/ Osteomyelitis of second toe of right foot (HCC), POD 1 right foot second toe amputation.  OT completed extensive review of pt's medical and social history. Comorbidities affecting pt's functional performance at time of assessment include: alcoholic cirrhosis, tobacco use, COPD, malnutrition, hx CVA, ambulatory dysfunction, anxiety, depression, alcohol abuse. Personal factors affecting pt at time of IE include:steps to enter environment, limited home support, behavioral pattern, difficulty performing ADLS, difficulty performing IADLS , limited insight into deficits, compliance, decreased initiation and engagement , and health management . Prior to admission, pt was living in an apartment, 5STE, alone, and was independent with ADL/IADL. Doesn't drive. Upon evaluation, pt presents to OT below baseline due to the following performance deficits: weakness, decreased strength, decreased balance, decreased tolerance, impaired problem solving, impulsivity, decreased safety awareness, increased pain, and orthopedic restrictions. Pt to benefit from continued skilled OT tx while in the hospital to address deficits as defined above and maximize level of functional independence w ADL's and functional mobility. Occupational Performance areas to address include: bathing/shower, toilet hygiene, dressing, health maintenance, functional mobility, and functional transfers . The patient's raw score on the AM-PAC Daily Activity inpatient short form is 18, standardized score is 38.66, less than 39.4. Patients at this level are likely to benefit from DC to post-acute rehabilitation  services. Based on findings, pt is of high complexity, due to medical comorbidities. Pt seen as a co-eval with PT due to the patient's co-morbidities, clinically unstable presentation, and present impairments which are a regression from the patient's baseline. At this time, OT recommendations at time of discharge are level 2.   Goals   Patient Goals less pain   Plan   Treatment Interventions ADL retraining;Functional transfer training;UE strengthening/ROM;Endurance training;Cognitive reorientation;Patient/family training;Equipment evaluation/education;Compensatory technique education;Continued evaluation;Energy conservation   Goal Expiration Date 09/14/24   OT Frequency 3-5x/wk   Discharge Recommendation   Rehab Resource Intensity Level, OT II (Moderate Resource Intensity)   AM-PAC Daily Activity Inpatient   Lower Body Dressing 2   Bathing 2   Toileting 3   Upper Body Dressing 3   Grooming 4   Eating 4   Daily Activity Raw Score 18   Daily Activity Standardized Score (Calc for Raw Score >=11) 38.66     Pt will achieve the following goals within 10 days.    *Pt will complete grooming with independence.    *Pt will complete UB bathing and dressing with independence.    *Pt will complete LB bathing and dressing with modified independence, including don/doff of specialty shoes .    *Pt will complete toileting (hygiene and clothing management) with modified independence    *Pt will complete bed mobility with independence, with bed flat and no side rail to prep for purposeful tasks    *Pt will perform functional transfers with modified independence, maintaining WB status, in order to complete ADL routine.    *Pt will increase standing tolerance to 3-5+ minutes in order to complete ADL routine.    *Pt will complete item retrieval and light home management with supervision while demonstrating good safety.    *Pt will demonstrate increased activity tolerance in order to complete ADL routine.    *Pt will participate in  cognitive assessment to determine level of safety for returning home    *Pt will participate in UE therapeutic exercise in order to maximize strength for ADL transfers.    *Pt will sit on EOB for 10+ minutes for increased safety with seated activity tolerance during ADL tasks.    *Pt will identify 3-5 fall risks to ensure safety upon discharge.    Maryse Torres MS, OTR/L

## 2024-09-04 NOTE — PROGRESS NOTES
[facility]  Progress Note  Name: Finn Oleary I  MRN: [id number]  Unit/Bed#: MS Mohinder-01 I Date of Admission: 9/1/2024   Date of Service: 9/4/2024 I Hospital Day: 3    Assessment & Plan   Moderate protein-calorie malnutrition (HCC)  Assessment & Plan  Malnutrition Findings:   Adult Malnutrition type: Chronic illness  Adult Degree of Malnutrition: Malnutrition of moderate degree  Malnutrition Characteristics: Fat loss, Muscle loss                  360 Statement: Pt presents with moderate protein calorie malnutrition as evidneced by someheat hollowed orbitals, prominent clavicle bone and b/l temporal depressions. Treat with diet and supplements BID.    BMI Findings:           Body mass index is 21.61 kg/m².       COPD (chronic obstructive pulmonary disease) ([facility])  Assessment & Plan  Continue home inhalers  Does not appear in COPD exacerbation at this time    Thrombocytopenia ([facility])  Assessment & Plan  Plt 118  Baseline about    Thrombocytopenia secondary to cirrhosis   Continue to monitor   Recent Labs     09/02/24  0503 09/03/24  0506 09/04/24  0517   PLT 84* 111* 109*        Tobacco use disorder  Assessment & Plan  Encouraged cessation    Alcoholic cirrhosis ([facility])  Assessment & Plan  History of alcoholic cirrhosis complicated by ascites, esophageal varices and hepatic encephalopathy in the past  Home regimen: encouraged patient to use medications as they were prescribed  Lactulose tid   Only using daily   Lasix 20 mg alternating with 40 mg   Patient only using prn   Ordered 20 mg daily while inpatient   Spironolactone 50 mg bid   Patient only using daily  MELD-Na: 11     * Osteomyelitis of second toe of right foot (HCC)  Assessment & Plan  Known osteomyelitis of second toe of right foot. Follows with Dr Baldwin outpatient. Seen on 8/30 and encouraged to come to the hospital for abx and amputation.   Not meeting SIRS   CRP WNL  Plan:   MRI foot- Ulceration distal second toe with  osteomyelitis of the second toe distal phalanx.Status post amputation of the great toe distal phalanx. Mild marrow edema seen within the distal aspect of the great toe proximal phalanx without confluent decreased T1 marrow signal to definitively suggest osteomyelitis  IV abx (Cef + Vanc)  Status post right second toe partial amputation by podiatry on 9/3  PT/OT-recommending rehab, patient agreeable  Podiatry following, appreciate recommendations               VTE Pharmacologic Prophylaxis: VTE Score: 3 Moderate Risk (Score 3-4) - Pharmacological DVT Prophylaxis Ordered: heparin.    Mobility:   Basic Mobility Inpatient Raw Score: 17  JH-HLM Goal: 5: Stand one or more mins  JH-HLM Achieved: 6: Walk 10 steps or more  JH-HLM Goal achieved. Continue to encourage appropriate mobility.    Patient Centered Rounds: I performed bedside rounds with nursing staff today.   Discussions with Specialists or Other Care Team Provider: yes    Education and Discussions with Family / Patient:  yes.     Total Time Spent on Date of Encounter in care of patient: 39 mins. This time was spent on one or more of the following: performing physical exam; counseling and coordination of care; obtaining or reviewing history; documenting in the medical record; reviewing/ordering tests, medications or procedures; communicating with other healthcare professionals and discussing with patient's family/caregivers.    Current Length of Stay: 3 day(s)  Current Patient Status: Inpatient   Certification Statement: 48-72 hours  Discharge Plan: 48-72 hrs     Code Status: Level 1 - Full Code    Subjective:   Seen and examined at bedside  Awake and alert  Vitals are stable    Objective:     Vitals:   Temp (24hrs), Av.1 °F (36.2 °C), Min:96.6 °F (35.9 °C), Max:97.6 °F (36.4 °C)    Temp:  [96.6 °F (35.9 °C)-97.6 °F (36.4 °C)] 97.5 °F (36.4 °C)  HR:  [44-60] 60  Resp:  [10-12] 10  BP: (108-124)/(50-74) 110/50  SpO2:  [90 %-100 %] 95 %  Body mass index is 21.61  kg/m².     Input and Output Summary (last 24 hours):     Intake/Output Summary (Last 24 hours) at 9/4/2024 1446  Last data filed at 9/4/2024 1357  Gross per 24 hour   Intake 780 ml   Output 475 ml   Net 305 ml       Physical Exam:   Physical Exam  Vitals reviewed.   Constitutional:       Comments: Malnourished and frail   HENT:      Head: Atraumatic.      Mouth/Throat:      Mouth: Mucous membranes are dry.   Cardiovascular:      Rate and Rhythm: Normal rate.      Heart sounds: Normal heart sounds.   Pulmonary:      Effort: No respiratory distress.   Abdominal:      General: Bowel sounds are normal.      Tenderness: There is no abdominal tenderness.   Musculoskeletal:         General: Tenderness present.   Skin:     General: Skin is dry.      Findings: Bruising and lesion present.   Neurological:      Mental Status: She is alert. Mental status is at baseline.   Psychiatric:         Mood and Affect: Mood normal.          Additional Data:     Labs:  Results from last 7 days   Lab Units 09/04/24  0517   WBC Thousand/uL 3.57*   HEMOGLOBIN g/dL 12.3   HEMATOCRIT % 36.8   PLATELETS Thousands/uL 109*   SEGS PCT % 47   LYMPHO PCT % 35   MONO PCT % 12   EOS PCT % 5     Results from last 7 days   Lab Units 09/04/24  0517 09/02/24  0503 09/01/24 2011   SODIUM mmol/L 137   < > 138   POTASSIUM mmol/L 4.4   < > 4.4   CHLORIDE mmol/L 104   < > 99   CO2 mmol/L 32   < > 35*   BUN mg/dL 19   < > 12   CREATININE mg/dL 0.82   < > 0.93   ANION GAP mmol/L 1*   < > 4   CALCIUM mg/dL 10.1   < > 10.0   ALBUMIN g/dL  --   --  3.3*   TOTAL BILIRUBIN mg/dL  --   --  1.04*   ALK PHOS U/L  --   --  121*   ALT U/L  --   --  17   AST U/L  --   --  38   GLUCOSE RANDOM mg/dL 114   < > 95    < > = values in this interval not displayed.     Results from last 7 days   Lab Units 09/01/24 2011   INR  1.25*     Results from last 7 days   Lab Units 09/03/24  1805   POC GLUCOSE mg/dl 77         Results from last 7 days   Lab Units 09/01/24 2011   LACTIC  ACID mmol/L 1.2   PROCALCITONIN ng/ml <0.05       Lines/Drains:  Invasive Devices       Peripheral Intravenous Line  Duration             Peripheral IV 09/03/24 Dorsal (posterior);Left Forearm 1 day              Airway  Duration             Non-Surgical Airway Oral pharyngeal airway 90 mm <1 day                          Imaging: procedure notes    Recent Cultures (last 7 days):         Last 24 Hours Medication List:   Current Facility-Administered Medications   Medication Dose Route Frequency Provider Last Rate    acetaminophen  650 mg Oral Q6H PRN Vince Jaramillo DPM      albuterol  2 puff Inhalation Q6H PRN Vince Jaramillo DPM      ALPRAZolam  0.5 mg Oral BID PRN Vince Jaramillo DPM      calcium carbonate  1,000 mg Oral TID PRN Sania Ag PA-C      cefepime  2,000 mg Intravenous Q12H TOM RobertoM 2,000 mg (09/04/24 1113)    diphenhydrAMINE  25 mg Intravenous Q6H PRN Vince Jaramillo DPM      Fluticasone Furoate-Vilanterol  1 puff Inhalation Daily Vince Jaramillo DPM      folic acid  1 mg Oral Daily Vince Jaramillo DPM      gabapentin  300 mg Oral BID Vince Jaramillo DPM      HYDROmorphone  0.5 mg Intravenous Q4H PRN Sania Ag PA-C      lactulose  20 g Oral TID Vince Jaramillo DPM      melatonin  6 mg Oral HS Vince Jaramillo DPM      metoprolol tartrate  12.5 mg Oral Daily Vince Jaramillo DPM      ondansetron  4 mg Intravenous Q6H PRN Vince Jaramillo DPM      oxyCODONE-acetaminophen  1 tablet Oral Q4H PRN Vince Jaramillo DPM      pantoprazole  40 mg Oral Daily Before Breakfast Vince Jaramillo DPM      simethicone  80 mg Oral Q6H PRN Sania Ag PA-C      spironolactone  50 mg Oral Daily Vince Jaramillo DPM      thiamine  100 mg Oral Daily Vince Jaramillo DPM      vancomycin  1,250 mg Intravenous Q24H Vince Jaramillo DPM          Today, Patient Was Seen By: Cristina Ford MD    **Please Note: This note may have been constructed using a voice recognition system.**

## 2024-09-04 NOTE — DISCHARGE INSTR - AVS FIRST PAGE
Right foot  Please change dressing with Adaptic dry dressing to second toe amputation site 3 times a week.  Do not get foot wet, may use cast cover to shower.  May heel touch weight-bear with Darco wedge shoe and walker for ADLs, pivot and transfer, minimal weight-bear for only when needed.  Follow with Dr. Baldwin at [facility] podiatry within 2 weeks of discharge.    Left ankle -may transition from cam boot to sneaker as tolerated.

## 2024-09-04 NOTE — PLAN OF CARE
Problem: OCCUPATIONAL THERAPY ADULT  Goal: Performs self-care activities at highest level of function for planned discharge setting.  See evaluation for individualized goals.  Description:  Outcome: Progressing  Note: Limitation: Decreased ADL status, Decreased UE ROM, Decreased UE strength, Decreased Safe judgement during ADL, Decreased cognition, Decreased endurance, Decreased self-care trans, Decreased high-level ADLs  Prognosis: Guarded  Assessment: Pt is a 68 y.o. female seen for OT evaluation at [facility], admitted 9/1/2024 w/ Osteomyelitis of second toe of right foot (HCC), POD 1 right foot second toe amputation.  OT completed extensive review of pt's medical and social history. Comorbidities affecting pt's functional performance at time of assessment include: alcoholic cirrhosis, tobacco use, COPD, malnutrition, hx CVA, ambulatory dysfunction, anxiety, depression, alcohol abuse. Personal factors affecting pt at time of IE include:steps to enter environment, limited home support, behavioral pattern, difficulty performing ADLS, difficulty performing IADLS , limited insight into deficits, compliance, decreased initiation and engagement , and health management . Prior to admission, pt was living in an apartment, 5STE, alone, and was independent with ADL/IADL. Doesn't drive. Upon evaluation, pt presents to OT below baseline due to the following performance deficits: weakness, decreased strength, decreased balance, decreased tolerance, impaired problem solving, impulsivity, decreased safety awareness, increased pain, and orthopedic restrictions. Pt to benefit from continued skilled OT tx while in the hospital to address deficits as defined above and maximize level of functional independence w ADL's and functional mobility. Occupational Performance areas to address include: bathing/shower, toilet hygiene, dressing, health maintenance, functional mobility, and functional transfers . The patient's raw score  on the AM-PAC Daily Activity inpatient short form is 18, standardized score is 38.66, less than 39.4. Patients at this level are likely to benefit from DC to post-acute rehabilitation services. Based on findings, pt is of high complexity, due to medical comorbidities. Pt seen as a co-eval with PT due to the patient's co-morbidities, clinically unstable presentation, and present impairments which are a regression from the patient's baseline. At this time, OT recommendations at time of discharge are level 2.     Rehab Resource Intensity Level, OT: II (Moderate Resource Intensity)

## 2024-09-04 NOTE — PROGRESS NOTES
Finn Oleary is a 68 y.o. female who is currently ordered Vancomycin IV with management by the Pharmacy Consult service.  Relevant clinical data and objective / subjective history reviewed.  Vancomycin Assessment:  Indication and Goal AUC/Trough: Bone/joint infection (goal -600, trough >10)  Clinical Status: stable  Micro:   N/A  Renal Function:  SCr: 0.82 mg/dL  CrCl: 53.6 mL/min  Renal replacement: Not on dialysis  Days of Therapy: 4  Current Dose: 1250mg every 24 hours  Vancomycin Plan:  New Dosing: No change  Estimated AUC: 550 mcg*hr/mL  Estimated Trough: 13.1 mcg/mL  Next Level: 9/6/24 at 0600  Renal Function Monitoring: Daily BMP and UOP  Pharmacy will continue to follow closely for s/sx of nephrotoxicity, infusion reactions and appropriateness of therapy.  BMP and CBC will be ordered per protocol. We will continue to follow the patient’s culture results and clinical progress daily. Also, cefepime will be adjusted if necessary    Jarod Alvarez, Pharmacist, PharmD, BCPS

## 2024-09-05 VITALS
RESPIRATION RATE: 16 BRPM | BODY MASS INDEX: 21.62 KG/M2 | OXYGEN SATURATION: 91 % | SYSTOLIC BLOOD PRESSURE: 123 MMHG | DIASTOLIC BLOOD PRESSURE: 90 MMHG | TEMPERATURE: 97.9 F | WEIGHT: 122 LBS | HEART RATE: 59 BPM | HEIGHT: 63 IN

## 2024-09-05 LAB
ANION GAP SERPL CALCULATED.3IONS-SCNC: 2 MMOL/L (ref 4–13)
BASOPHILS # BLD AUTO: 0.03 THOUSANDS/ÂΜL (ref 0–0.1)
BASOPHILS NFR BLD AUTO: 1 % (ref 0–1)
BUN SERPL-MCNC: 17 MG/DL (ref 5–25)
CALCIUM SERPL-MCNC: 10.2 MG/DL (ref 8.4–10.2)
CHLORIDE SERPL-SCNC: 103 MMOL/L (ref 96–108)
CO2 SERPL-SCNC: 32 MMOL/L (ref 21–32)
CREAT SERPL-MCNC: 0.74 MG/DL (ref 0.6–1.3)
EOSINOPHIL # BLD AUTO: 0.19 THOUSAND/ÂΜL (ref 0–0.61)
EOSINOPHIL NFR BLD AUTO: 5 % (ref 0–6)
ERYTHROCYTE [DISTWIDTH] IN BLOOD BY AUTOMATED COUNT: 13.1 % (ref 11.6–15.1)
GFR SERPL CREATININE-BSD FRML MDRD: 82 ML/MIN/1.73SQ M
GLUCOSE SERPL-MCNC: 105 MG/DL (ref 65–140)
HCT VFR BLD AUTO: 37.7 % (ref 34.8–46.1)
HGB BLD-MCNC: 12.6 G/DL (ref 11.5–15.4)
IMM GRANULOCYTES # BLD AUTO: 0.01 THOUSAND/UL (ref 0–0.2)
IMM GRANULOCYTES NFR BLD AUTO: 0 % (ref 0–2)
LYMPHOCYTES # BLD AUTO: 1.31 THOUSANDS/ÂΜL (ref 0.6–4.47)
LYMPHOCYTES NFR BLD AUTO: 34 % (ref 14–44)
MAGNESIUM SERPL-MCNC: 1.5 MG/DL (ref 1.9–2.7)
MCH RBC QN AUTO: 32.6 PG (ref 26.8–34.3)
MCHC RBC AUTO-ENTMCNC: 33.4 G/DL (ref 31.4–37.4)
MCV RBC AUTO: 97 FL (ref 82–98)
MONOCYTES # BLD AUTO: 0.45 THOUSAND/ÂΜL (ref 0.17–1.22)
MONOCYTES NFR BLD AUTO: 12 % (ref 4–12)
NEUTROPHILS # BLD AUTO: 1.88 THOUSANDS/ÂΜL (ref 1.85–7.62)
NEUTS SEG NFR BLD AUTO: 48 % (ref 43–75)
NRBC BLD AUTO-RTO: 0 /100 WBCS
PLATELET # BLD AUTO: 104 THOUSANDS/UL (ref 149–390)
PMV BLD AUTO: 10.8 FL (ref 8.9–12.7)
POTASSIUM SERPL-SCNC: 4.6 MMOL/L (ref 3.5–5.3)
RBC # BLD AUTO: 3.87 MILLION/UL (ref 3.81–5.12)
SODIUM SERPL-SCNC: 137 MMOL/L (ref 135–147)
WBC # BLD AUTO: 3.87 THOUSAND/UL (ref 4.31–10.16)

## 2024-09-05 PROCEDURE — 85025 COMPLETE CBC W/AUTO DIFF WBC: CPT

## 2024-09-05 PROCEDURE — 99239 HOSP IP/OBS DSCHRG MGMT >30: CPT | Performed by: INTERNAL MEDICINE

## 2024-09-05 PROCEDURE — 80048 BASIC METABOLIC PNL TOTAL CA: CPT

## 2024-09-05 PROCEDURE — 83735 ASSAY OF MAGNESIUM: CPT | Performed by: INTERNAL MEDICINE

## 2024-09-05 RX ORDER — OXYCODONE AND ACETAMINOPHEN 5; 325 MG/1; MG/1
1 TABLET ORAL EVERY 6 HOURS PRN
Qty: 10 TABLET | Refills: 0 | Status: SHIPPED | OUTPATIENT
Start: 2024-09-05 | End: 2024-09-15

## 2024-09-05 RX ORDER — METOPROLOL TARTRATE 25 MG/1
12.5 TABLET, FILM COATED ORAL DAILY
Start: 2024-09-06

## 2024-09-05 RX ORDER — DOXYCYCLINE 100 MG/1
100 CAPSULE ORAL EVERY 12 HOURS SCHEDULED
Qty: 6 CAPSULE | Refills: 0 | Status: SHIPPED | OUTPATIENT
Start: 2024-09-05 | End: 2024-09-08

## 2024-09-05 RX ORDER — MAGNESIUM SULFATE HEPTAHYDRATE 40 MG/ML
2 INJECTION, SOLUTION INTRAVENOUS ONCE
Status: COMPLETED | OUTPATIENT
Start: 2024-09-05 | End: 2024-09-05

## 2024-09-05 RX ORDER — DOXYCYCLINE 100 MG/1
100 CAPSULE ORAL EVERY 12 HOURS SCHEDULED
Status: DISCONTINUED | OUTPATIENT
Start: 2024-09-05 | End: 2024-09-05 | Stop reason: HOSPADM

## 2024-09-05 RX ADMIN — LACTULOSE 20 G: 20 SOLUTION ORAL at 09:31

## 2024-09-05 RX ADMIN — HYDROMORPHONE HYDROCHLORIDE 0.5 MG: 1 INJECTION, SOLUTION INTRAMUSCULAR; INTRAVENOUS; SUBCUTANEOUS at 00:36

## 2024-09-05 RX ADMIN — DOXYCYCLINE 100 MG: 100 CAPSULE ORAL at 09:28

## 2024-09-05 RX ADMIN — FLUTICASONE FUROATE AND VILANTEROL TRIFENATATE 1 PUFF: 100; 25 POWDER RESPIRATORY (INHALATION) at 09:32

## 2024-09-05 RX ADMIN — OXYCODONE HYDROCHLORIDE AND ACETAMINOPHEN 1 TABLET: 5; 325 TABLET ORAL at 09:28

## 2024-09-05 RX ADMIN — OXYCODONE HYDROCHLORIDE AND ACETAMINOPHEN 1 TABLET: 5; 325 TABLET ORAL at 05:12

## 2024-09-05 RX ADMIN — OXYCODONE HYDROCHLORIDE AND ACETAMINOPHEN 1 TABLET: 5; 325 TABLET ORAL at 14:19

## 2024-09-05 RX ADMIN — MAGNESIUM SULFATE HEPTAHYDRATE 2 G: 2 INJECTION, SOLUTION INTRAVENOUS at 09:28

## 2024-09-05 RX ADMIN — Medication 12.5 MG: at 09:30

## 2024-09-05 RX ADMIN — FOLIC ACID 1 MG: 1 TABLET ORAL at 09:30

## 2024-09-05 RX ADMIN — Medication 100 MG: at 09:31

## 2024-09-05 RX ADMIN — PANTOPRAZOLE SODIUM 40 MG: 40 TABLET, DELAYED RELEASE ORAL at 05:12

## 2024-09-05 RX ADMIN — GABAPENTIN 300 MG: 300 CAPSULE ORAL at 09:31

## 2024-09-05 RX ADMIN — SPIRONOLACTONE 50 MG: 25 TABLET ORAL at 09:31

## 2024-09-05 RX ADMIN — HYDROMORPHONE HYDROCHLORIDE 0.5 MG: 1 INJECTION, SOLUTION INTRAMUSCULAR; INTRAVENOUS; SUBCUTANEOUS at 10:28

## 2024-09-05 RX ADMIN — HYDROMORPHONE HYDROCHLORIDE 0.5 MG: 1 INJECTION, SOLUTION INTRAMUSCULAR; INTRAVENOUS; SUBCUTANEOUS at 15:07

## 2024-09-05 NOTE — CASE MANAGEMENT
Case Management Discharge Planning Note    Patient name Finn Oleary  Location /-01 MRN [id number]  : 1955 Date 2024       Current Admission Date: 2024  Current Admission Diagnosis:Osteomyelitis of second toe of right foot (HCC)   Patient Active Problem List    Diagnosis Date Noted Date Diagnosed    Moderate protein-calorie malnutrition (HCC) 2024     CVA (cerebral vascular accident) (HCC)      Skin ulcer of second toe, right, with necrosis of bone (HCC) 2024     Osteomyelitis of second toe of right foot (HCC) 2024     COPD (chronic obstructive pulmonary disease) (HCC) 2024     Positive blood culture 2024     Alcohol abuse 2024     Osteomyelitis of great toe of right foot (HCC) 2024     Metabolic encephalopathy 2024     Thrombocytopenia (HCC) 2024     Elevated TSH 2024     Tobacco use disorder 2022     Generalized anxiety disorder 2022     Major depressive disorder, recurrent episode, moderate (HCC) 2022     Presence of surgical incision 2021     Ambulatory dysfunction 2021     Esophageal candidiasis (HCC) 2019     Alcoholic cirrhosis (HCC) 2019     Ascites 2019     Jaundice 2019     Hepatic encephalopathy (HCC) 2019     Esophageal varices (HCC) 2019       LOS (days): 4  Geometric Mean LOS (GMLOS) (days): 4.5  Days to GMLOS:0.9     OBJECTIVE:  Risk of Unplanned Readmission Score: 17.73         Current admission status: Inpatient   [facility]   [id number] [place] [facility] [address] [facility]  [address] [facility] [address]  Phone: [phone] Fax: [phone]    Primary Care Provider: Scot Brink MD    Primary Insurance: MEDICARE  Secondary Insurance: [facility]    DISCHARGE DETAILS:                                              IMM Given (Date):: 24  IMM Given to:: Patient

## 2024-09-05 NOTE — PROGRESS NOTES
Finn Oleary is a 68 y.o. female who is currently ordered Vancomycin IV with management by the Pharmacy Consult service.  Relevant clinical data and objective / subjective history reviewed.  Vancomycin Assessment:  Indication and Goal AUC/Trough: Bone/joint infection (goal -600, trough >10)  Clinical Status: stable  Micro:   N/A  Renal Function:  SCr: 0.74 mg/dL  CrCl: 59.4 mL/min  Renal replacement: Not on dialysis  Days of Therapy: 5  Current Dose: 1250mg every 24 hours  Vancomycin Plan:  New Dosing: No change  Estimated AUC: 499 mcg*hr/mL  Estimated Trough: 11.2 mcg/mL  Next Level: 9/6/24 at 0600  Renal Function Monitoring: Daily BMP and UOP  Pharmacy will continue to follow closely for s/sx of nephrotoxicity, infusion reactions and appropriateness of therapy.  BMP and CBC will be ordered per protocol. We will continue to follow the patient’s culture results and clinical progress daily. Also, cefepime will be adjusted if necessary.    Jarod Alvarez, Pharmacist, PharmD, BCPS

## 2024-09-05 NOTE — CASE MANAGEMENT
Case Management Discharge Planning Note    Patient name Finn Oleary  Location /-01 MRN [id number]  : 1955 Date 2024       Current Admission Date: 2024  Current Admission Diagnosis:Osteomyelitis of second toe of right foot (HCC)   Patient Active Problem List    Diagnosis Date Noted Date Diagnosed    Moderate protein-calorie malnutrition (HCC) 2024     CVA (cerebral vascular accident) (HCC)      Skin ulcer of second toe, right, with necrosis of bone (HCC) 2024     Osteomyelitis of second toe of right foot (HCC) 2024     COPD (chronic obstructive pulmonary disease) (HCC) 2024     Positive blood culture 2024     Alcohol abuse 2024     Osteomyelitis of great toe of right foot (HCC) 2024     Metabolic encephalopathy 2024     Thrombocytopenia (HCC) 2024     Elevated TSH 2024     Tobacco use disorder 2022     Generalized anxiety disorder 2022     Major depressive disorder, recurrent episode, moderate (HCC) 2022     Presence of surgical incision 2021     Ambulatory dysfunction 2021     Esophageal candidiasis (HCC) 2019     Alcoholic cirrhosis (HCC) 2019     Ascites 2019     Jaundice 2019     Hepatic encephalopathy (HCC) 2019     Esophageal varices (HCC) 2019       LOS (days): 4  Geometric Mean LOS (GMLOS) (days): 4.5  Days to GMLOS:0.9     OBJECTIVE:  Risk of Unplanned Readmission Score: 17.73         Current admission status: Inpatient   Preferred Pharmacy:   [id number] - [place], [place] - [address] [facility]  [address] [facility] [address]  Phone: [phone] Fax: [phone]    Primary Care Provider: Scot Brink MD    Primary Insurance: MEDICARE  Secondary Insurance: [facility]    DISCHARGE DETAILS:     Patient for discharge today after 3 pm to go to [facility]. Met with patient , discussed WC  van transportation and cost to patient and she is agreeable. Request for  van transport arranged through roundtrip at 3:30.  Wait confirmation of transport time.

## 2024-09-05 NOTE — PLAN OF CARE
Problem: PAIN - ADULT  Goal: Verbalizes/displays adequate comfort level or baseline comfort level  Description: Interventions:  - Encourage patient to monitor pain and request assistance  - Assess pain using appropriate pain scale  - Administer analgesics based on type and severity of pain and evaluate response  - Implement non-pharmacological measures as appropriate and evaluate response  - Consider cultural and social influences on pain and pain management  - Notify physician/advanced practitioner if interventions unsuccessful or patient reports new pain  Outcome: Progressing     Problem: INFECTION - ADULT  Goal: Absence or prevention of progression during hospitalization  Description: INTERVENTIONS:  - Assess and monitor for signs and symptoms of infection  - Monitor lab/diagnostic results  - Monitor all insertion sites, i.e. indwelling lines, tubes, and drains  - Monitor endotracheal if appropriate and nasal secretions for changes in amount and color  - [place] appropriate cooling/warming therapies per order  - Administer medications as ordered  - Instruct and encourage patient and family to use good hand hygiene technique  - Identify and instruct in appropriate isolation precautions for identified infection/condition  Outcome: Progressing  Goal: Absence of fever/infection during neutropenic period  Description: INTERVENTIONS:  - Monitor WBC    Outcome: Progressing     Goal: Maintain or return to baseline ADL function  Description: INTERVENTIONS:  -  Assess patient's ability to carry out ADLs; assess patient's baseline for ADL function and identify physical deficits which impact ability to perform ADLs (bathing, care of mouth/teeth, toileting, grooming, dressing, etc.)  - Assess/evaluate cause of self-care deficits   - Assess range of motion  - Assess patient's mobility; develop plan if impaired  - Assess patient's need for assistive devices and provide as appropriate  - Encourage maximum independence but  intervene and supervise when necessary  - Involve family in performance of ADLs  - Assess for home care needs following discharge   - Consider OT consult to assist with ADL evaluation and planning for discharge  - Provide patient education as appropriate  Outcome: Progressing     Problem: DISCHARGE PLANNING  Goal: Discharge to home or other facility with appropriate resources  Description: INTERVENTIONS:  - Identify barriers to discharge w/patient and caregiver  - Arrange for needed discharge resources and transportation as appropriate  - Identify discharge learning needs (meds, wound care, etc.)  - Arrange for interpretive services to assist at discharge as needed  - Refer to Case Management Department for coordinating discharge planning if the patient needs post-hospital services based on physician/advanced practitioner order or complex needs related to functional status, cognitive ability, or social support system  Outcome: Progressing     Problem: Knowledge Deficit  Goal: Patient/family/caregiver demonstrates understanding of disease process, treatment plan, medications, and discharge instructions  Description: Complete learning assessment and assess knowledge base.  Interventions:  - Provide teaching at level of understanding  - Provide teaching via preferred learning methods  Outcome: Progressing     Problem: Nutrition/Hydration-ADULT  Goal: Nutrient/Hydration intake appropriate for improving, restoring or maintaining nutritional needs  Description: Monitor and assess patient's nutrition/hydration status for malnutrition. Collaborate with interdisciplinary team and initiate plan and interventions as ordered.  Monitor patient's weight and dietary intake as ordered or per policy. Utilize nutrition screening tool and intervene as necessary. Determine patient's food preferences and provide high-protein, high-caloric foods as appropriate.     INTERVENTIONS:  - Monitor oral intake, urinary output, labs, and  treatment plans  - Assess nutrition and hydration status and recommend course of action  - Evaluate amount of meals eaten  - Assist patient with eating if necessary   - Allow adequate time for meals  - Recommend/ encourage appropriate diets, oral nutritional supplements, and vitamin/mineral supplements  - Order, calculate, and assess calorie counts as needed  - Recommend, monitor, and adjust tube feedings and TPN/PPN based on assessed needs  - Assess need for intravenous fluids  - Provide specific nutrition/hydration education as appropriate  - Include patient/family/caregiver in decisions related to nutrition  Outcome: Progressing     Problem: Potential for Falls  Goal: Patient will remain free of falls  Description: INTERVENTIONS:  - Educate patient/family on patient safety including physical limitations  - Instruct patient to call for assistance with activity   - Consult OT/PT to assist with strengthening/mobility   - Keep Call bell within reach  - Keep bed low and locked with side rails adjusted as appropriate  - Keep care items and personal belongings within reach  - Initiate and maintain comfort rounds  - Make Fall Risk Sign visible to staff  - Offer Toileting every 2 Hours, in advance of need  - Initiate/Maintain bed alarm  - Obtain necessary fall risk management equipment: nonskid footwear  - Apply yellow socks and bracelet for high fall risk patients  - Consider moving patient to room near nurses station  Outcome: Progressing

## 2024-09-05 NOTE — CASE MANAGEMENT
Case Management Discharge Planning Note    Patient name Finn Oleary  Location /-01 MRN [id number]  : 1955 Date 2024       Current Admission Date: 2024  Current Admission Diagnosis:Osteomyelitis of second toe of right foot (HCC)   Patient Active Problem List    Diagnosis Date Noted Date Diagnosed    Moderate protein-calorie malnutrition (HCC) 2024     CVA (cerebral vascular accident) (HCC)      Skin ulcer of second toe, right, with necrosis of bone (HCC) 2024     Osteomyelitis of second toe of right foot (HCC) 2024     COPD (chronic obstructive pulmonary disease) (HCC) 2024     Positive blood culture 2024     Alcohol abuse 2024     Osteomyelitis of great toe of right foot (HCC) 2024     Metabolic encephalopathy 2024     Thrombocytopenia (HCC) 2024     Elevated TSH 2024     Tobacco use disorder 2022     Generalized anxiety disorder 2022     Major depressive disorder, recurrent episode, moderate (HCC) 2022     Presence of surgical incision 2021     Ambulatory dysfunction 2021     Esophageal candidiasis (HCC) 2019     Alcoholic cirrhosis (HCC) 2019     Ascites 2019     Jaundice 2019     Hepatic encephalopathy (HCC) 2019     Esophageal varices (HCC) 2019       LOS (days): 4  Geometric Mean LOS (GMLOS) (days): 4.5  Days to GMLOS:1     OBJECTIVE:  Risk of Unplanned Readmission Score: 17.73         Current admission status: Inpatient   Preferred Pharmacy:   [id number] - [place] - [address] [facility]  [address] [facility]  [place] [address]  Phone: [phone] Fax: [phone]    Primary Care Provider: Scot Brink MD    Primary Insurance: MEDICARE  Secondary Insurance: [facility]    DISCHARGE DETAILS:        Met with patient and provided list of accepting SNF. Her preference is [facility] as she was  jt in the past.  [facility] reserved on [address].

## 2024-09-05 NOTE — PLAN OF CARE
Problem: PAIN - ADULT  Goal: Verbalizes/displays adequate comfort level or baseline comfort level  Description: Interventions:  - Encourage patient to monitor pain and request assistance  - Assess pain using appropriate pain scale  - Administer analgesics based on type and severity of pain and evaluate response  - Implement non-pharmacological measures as appropriate and evaluate response  - Consider cultural and social influences on pain and pain management  - Notify physician/advanced practitioner if interventions unsuccessful or patient reports new pain  Outcome: Progressing     Problem: INFECTION - ADULT  Goal: Absence or prevention of progression during hospitalization  Description: INTERVENTIONS:  - Assess and monitor for signs and symptoms of infection  - Monitor lab/diagnostic results  - Monitor all insertion sites, i.e. indwelling lines, tubes, and drains  - Monitor endotracheal if appropriate and nasal secretions for changes in amount and color  - [place] appropriate cooling/warming therapies per order  - Administer medications as ordered  - Instruct and encourage patient and family to use good hand hygiene technique  - Identify and instruct in appropriate isolation precautions for identified infection/condition  Outcome: Progressing  Goal: Absence of fever/infection during neutropenic period  Description: INTERVENTIONS:  - Monitor WBC    Outcome: Progressing     Problem: SAFETY ADULT  Goal: Patient will remain free of falls  Description: INTERVENTIONS:  - Educate patient/family on patient safety including physical limitations  - Instruct patient to call for assistance with activity   - Consult OT/PT to assist with strengthening/mobility   - Keep Call bell within reach  - Keep bed low and locked with side rails adjusted as appropriate  - Keep care items and personal belongings within reach  - Initiate and maintain comfort rounds  - Make Fall Risk Sign visible to staff  - Offer Toileting every 2 Hours,  in advance of need  - Initiate/Maintain bed alarm  - Obtain necessary fall risk management equipment: RW  - Apply yellow socks and bracelet for high fall risk patients  - Consider moving patient to room near nurses station  Outcome: Progressing  Goal: Maintain or return to baseline ADL function  Description: INTERVENTIONS:  -  Assess patient's ability to carry out ADLs; assess patient's baseline for ADL function and identify physical deficits which impact ability to perform ADLs (bathing, care of mouth/teeth, toileting, grooming, dressing, etc.)  - Assess/evaluate cause of self-care deficits   - Assess range of motion  - Assess patient's mobility; develop plan if impaired  - Assess patient's need for assistive devices and provide as appropriate  - Encourage maximum independence but intervene and supervise when necessary  - Involve family in performance of ADLs  - Assess for home care needs following discharge   - Consider OT consult to assist with ADL evaluation and planning for discharge  - Provide patient education as appropriate  Outcome: Progressing  Goal: Maintains/Returns to pre admission functional level  Description: INTERVENTIONS:  - Perform AM-PAC 6 Click Basic Mobility/ Daily Activity assessment daily.  - Set and communicate daily mobility goal to care team and patient/family/caregiver.   - Collaborate with rehabilitation services on mobility goals if consulted  - Perform Range of Motion 3 times a day.  - Reposition patient every 3 hours.  - Dangle patient 3 times a day  - Stand patient 3 times a day  - Ambulate patient 3 times a day  - Out of bed to chair 3 times a day   - Out of bed for meals 3 times a day  - Out of bed for toileting  - Record patient progress and toleration of activity level   Outcome: Progressing     Problem: DISCHARGE PLANNING  Goal: Discharge to home or other facility with appropriate resources  Description: INTERVENTIONS:  - Identify barriers to discharge w/patient and caregiver  -  Arrange for needed discharge resources and transportation as appropriate  - Identify discharge learning needs (meds, wound care, etc.)  - Arrange for interpretive services to assist at discharge as needed  - Refer to Case Management Department for coordinating discharge planning if the patient needs post-hospital services based on physician/advanced practitioner order or complex needs related to functional status, cognitive ability, or social support system  Outcome: Progressing     Problem: Knowledge Deficit  Goal: Patient/family/caregiver demonstrates understanding of disease process, treatment plan, medications, and discharge instructions  Description: Complete learning assessment and assess knowledge base.  Interventions:  - Provide teaching at level of understanding  - Provide teaching via preferred learning methods  Outcome: Progressing     Problem: Nutrition/Hydration-ADULT  Goal: Nutrient/Hydration intake appropriate for improving, restoring or maintaining nutritional needs  Description: Monitor and assess patient's nutrition/hydration status for malnutrition. Collaborate with interdisciplinary team and initiate plan and interventions as ordered.  Monitor patient's weight and dietary intake as ordered or per policy. Utilize nutrition screening tool and intervene as necessary. Determine patient's food preferences and provide high-protein, high-caloric foods as appropriate.     INTERVENTIONS:  - Monitor oral intake, urinary output, labs, and treatment plans  - Assess nutrition and hydration status and recommend course of action  - Evaluate amount of meals eaten  - Assist patient with eating if necessary   - Allow adequate time for meals  - Recommend/ encourage appropriate diets, oral nutritional supplements, and vitamin/mineral supplements  - Order, calculate, and assess calorie counts as needed  - Recommend, monitor, and adjust tube feedings and TPN/PPN based on assessed needs  - Assess need for intravenous  fluids  - Provide specific nutrition/hydration education as appropriate  - Include patient/family/caregiver in decisions related to nutrition  Outcome: Progressing     Problem: Potential for Falls  Goal: Patient will remain free of falls  Description: INTERVENTIONS:  - Educate patient/family on patient safety including physical limitations  - Instruct patient to call for assistance with activity   - Consult OT/PT to assist with strengthening/mobility   - Keep Call bell within reach  - Keep bed low and locked with side rails adjusted as appropriate  - Keep care items and personal belongings within reach  - Initiate and maintain comfort rounds  - Make Fall Risk Sign visible to staff  - Offer Toileting every 2 Hours, in advance of need  - Initiate/Maintain bed alarm  - Obtain necessary fall risk management equipment: RW  - Apply yellow socks and bracelet for high fall risk patients  - Consider moving patient to room near nurses station  Outcome: Progressing

## 2024-09-05 NOTE — DISCHARGE SUMMARY
[facility]  Discharge- Finn Oleary 1955, 68 y.o. female MRN: [id number]  Unit/Bed#: [facility]-01 Encounter: [id number]  Primary Care Provider: Scot Brink MD   Date and time admitted to hospital: 9/1/2024  6:57 PM    Moderate protein-calorie malnutrition (HCC)  Assessment & Plan  Malnutrition Findings:   Adult Malnutrition type: Chronic illness  Adult Degree of Malnutrition: Malnutrition of moderate degree  Malnutrition Characteristics: Fat loss, Muscle loss                  360 Statement: Pt presents with moderate protein calorie malnutrition as evidneced by someheat hollowed orbitals, prominent clavicle bone and b/l temporal depressions. Treat with diet and supplements BID.    BMI Findings:           Body mass index is 21.61 kg/m².       COPD (chronic obstructive pulmonary disease) (HCC)  Assessment & Plan  Continue home inhalers  Does not appear in COPD exacerbation at this time    Thrombocytopenia (HCC)  Assessment & Plan  Plt 118  Baseline about    Thrombocytopenia secondary to cirrhosis   Continue to monitor   Recent Labs     09/03/24  0506 09/04/24  0517 09/05/24  0502   * 109* 104*        Tobacco use disorder  Assessment & Plan  Encouraged cessation    Alcoholic cirrhosis (HCC)  Assessment & Plan  History of alcoholic cirrhosis complicated by ascites, esophageal varices and hepatic encephalopathy in the past  Home regimen: encouraged patient to use medications as they were prescribed  Lactulose tid   Only using daily   Lasix 20 mg alternating with 40 mg   Patient only using prn   Spironolactone 50 mg bid   Patient only using daily  MELD-Na: 11     * Osteomyelitis of second toe of right foot (HCC)  Assessment & Plan  Known osteomyelitis of second toe of right foot. Follows with Dr Baldwin outpatient. Seen on 8/30 and encouraged to come to the hospital for abx and amputation.   Not meeting SIRS   CRP WNL  Plan:   MRI foot- Ulceration distal second toe with  osteomyelitis of the second toe distal phalanx.Status post amputation of the great toe distal phalanx. Mild marrow edema seen within the distal aspect of the great toe proximal phalanx without confluent decreased T1 marrow signal to definitively suggest osteomyelitis  IV abx (Cef + Vanc), discharged on p.o. doxycycline 100 mg twice daily for total 7 days  Status post right second toe partial amputation by podiatry on 9/3  PT/OT-recommending rehab, patient agreeable  Outpatient podiatry follow-up            Admission Date:   Admission Orders (From admission, onward)       Ordered        09/01/24 2047  INPATIENT ADMISSION  Once                            Admitting Diagnosis: Toe pain [M79.676]  Osteomyelitis of second toe of right foot (HCC) [M86.9]    HPI: as per, Sania Ag PA-C  , on 9/1 Finn Oleary is a 68 y.o. female with a PMH of COPD, alcoholic cirrhosis, tobacco use disorder, thrombocytopenia who presents from home with her known osteomyelitis of second toe of right foot.  Follows with Dr. Baldwin outpatient.  Was seen on 8/30 and encouraged to come to the hospital for antibiotics and amputation.  She was informed that amputation would most likely occur on 9/2 and she should come to the hospital prior to this for surgical planning.  She is not meeting SIRS criteria.  Started on IV antibiotics.  Agreeable to plan.     Procedures Performed:   Orders Placed This Encounter   Procedures    ED ECG Documentation Only       Summary of Hospital Course: Patient presented with osteomyelitis of right second toe, she was seen and evaluated by podiatry.  She underwent partial amputation of right second toe, and did well postoperatively.  She was on IV antibiotics and transition to doxycycline 100 mg twice a day for 7 days.  She was evaluated by PT/OT, who recommended rehab.  Care management help with necessary arrangements.  For her recent left lateral ankle fracture, x-rays were repeated and reviewed by podiatry.   Imaging was consistent with stable appearance of cortical irregularity at the tip of lateral malleolus, and patient remained asymptomatic.  Therefore patient is being transitioned from cam boot to sneaker and weightbearing as tolerated on the left ankle/foot.    Significant Findings, Care, Treatment and Services Provided: see above     Complications: none    Condition at Discharge: fair         Discharge instructions/Information to patient and family:   See after visit summary for information provided to patient and family.      Provisions for Follow-Up Care:  See after visit summary for information related to follow-up care and any pertinent home health orders.      PCP: Scot Brink MD    Disposition: Short-term rehab at [facility]     Planned Readmission: No    Discharge Statement   I spent 36 minutes discharging the patient. This time was spent on the day of discharge. I had direct contact with the patient on the day of discharge. Additional documentation is required if more than 30 minutes were spent on discharge.     Discharge Medications:  See after visit summary for reconciled discharge medications provided to patient and family.

## 2024-09-05 NOTE — PROGRESS NOTES
Vancomycin IV Pharmacy-to-Dose Consultation     Vancomycin has been discontinued.  Pharmacy will sign off.  Please contact or re-consult with questions.    Azael Osuna, Pharmacist

## 2024-09-05 NOTE — PROGRESS NOTES
This RN attempted to call [facility] for report on pt and was put on hold and was unable to get in contact with a nurse.

## 2024-09-05 NOTE — CASE MANAGEMENT
Case Management Discharge Planning Note    Patient name Finn Oleary  Location /-01 MRN [id number]  : 1955 Date 2024       Current Admission Date: 2024  Current Admission Diagnosis:Osteomyelitis of second toe of right foot (HCC)   Patient Active Problem List    Diagnosis Date Noted Date Diagnosed    Moderate protein-calorie malnutrition (HCC) 2024     CVA (cerebral vascular accident) (HCC)      Skin ulcer of second toe, right, with necrosis of bone (HCC) 2024     Osteomyelitis of second toe of right foot (HCC) 2024     COPD (chronic obstructive pulmonary disease) (HCC) 2024     Positive blood culture 2024     Alcohol abuse 2024     Osteomyelitis of great toe of right foot (HCC) 2024     Metabolic encephalopathy 2024     Thrombocytopenia (HCC) 2024     Elevated TSH 2024     Tobacco use disorder 2022     Generalized anxiety disorder 2022     Major depressive disorder, recurrent episode, moderate (HCC) 2022     Presence of surgical incision 2021     Ambulatory dysfunction 2021     Esophageal candidiasis (HCC) 2019     Alcoholic cirrhosis (HCC) 2019     Ascites 2019     Jaundice 2019     Hepatic encephalopathy (HCC) 2019     Esophageal varices (HCC) 2019       LOS (days): 4  Geometric Mean LOS (GMLOS) (days): 4.5  Days to GMLOS:0.8     OBJECTIVE:  Risk of Unplanned Readmission Score: 17.4         Current admission status: Inpatient   Preferred Pharmacy:   [id number] - [place], [place] - [address]  [id number] [facility] [address]  Phone: [phone] Fax: [phone]    Primary Care Provider: Scot Brink MD    Primary Insurance: MEDICARE  Secondary Insurance: [facility]    DISCHARGE DETAILS:     As per roundtrip, [employer] van transportation can transport patient t 3:30 today to [facility].  Notified admission at [facility] via rachid Jones MOM for patient's son, Dimitry at [phone] and Rupal, patient's nurse of transport time.

## 2024-09-05 NOTE — PROGRESS NOTES
Patient:    MRN:  [id number]    Robert Request ID:  [id number]    Level of care reserved:  Skilled Nursing Facility    Partner Reserved:  [facility], [place] [address] [phone]    Clinical needs requested:    Geography searched:  10 miles around [address]    Start of Service:    Request sent:  1:33pm EDT on 9/4/2024 by Almaz Ricci    Partner reserved:  9:25am EDT on 9/5/2024 by Akanksha Sanchez    Choice list shared:  9:11am EDT on 9/5/2024 by Akanksha Sanchez

## 2024-09-06 LAB
ATRIAL RATE: 55 BPM
P AXIS: 52 DEGREES
PR INTERVAL: 120 MS
QRS AXIS: 42 DEGREES
QRSD INTERVAL: 84 MS
QT INTERVAL: 440 MS
QTC INTERVAL: 420 MS
T WAVE AXIS: 44 DEGREES
VENTRICULAR RATE: 55 BPM

## 2024-09-06 PROCEDURE — 93010 ELECTROCARDIOGRAM REPORT: CPT | Performed by: INTERNAL MEDICINE

## 2024-09-09 PROCEDURE — 88311 DECALCIFY TISSUE: CPT | Performed by: PATHOLOGY

## 2024-09-09 PROCEDURE — 88305 TISSUE EXAM BY PATHOLOGIST: CPT | Performed by: PATHOLOGY

## 2024-09-12 NOTE — PROGRESS NOTES
"Patient ID: Finn Oleary is a 68 y.o. female Date of Birth 1955       Chief Complaint   Patient presents with    Foot Problem     PO 2nd Right toe             Diagnosis:  1. Status post amputation of lesser toe of right foot (HCC)  2. Osteomyelitis of second toe of right foot (HCC)  -     Ambulatory Referral to Podiatry    Status post second partial second toe amputation right foot 9/3/24  Adaptic dry dressing was changed, facility will change dressing 3 times a week.  Patient may transition to flat surgical shoe right and regular shoe left and weight-bear as tolerated for ADLs.  Written orders were placed for facility.  Patient understands and agrees with plan will follow-up in 1 weeks for suture removal.          Subjective:   Finn presents today for follow-up care of partial second toe amputation right foot for osteomyelitis on 9/3/2024, she is at skilled nursing facility, they change dressings as directed, she has no new complaints.          The following portions of the patient's history were reviewed and updated as appropriate: allergies, current medications, past family history, past medical history, past social history, past surgical history, and problem list.        Objective:  /58 (BP Location: Left arm, Patient Position: Sitting, Cuff Size: Adult)   Pulse 61   Temp (!) 97.4 °F (36.3 °C) (Temporal)   Ht 5' 3\" (1.6 m) Comment: verbal  Wt 55.3 kg (122 lb) Comment: verbal  LMP  (LMP Unknown)   BMI 21.61 kg/m²     Review of Systems   Constitutional:  Negative for chills and fever.   HENT:  Negative for ear pain and sore throat.    Eyes:  Negative for pain and visual disturbance.   Respiratory:  Negative for cough and shortness of breath.    Cardiovascular:  Negative for chest pain and palpitations.   Gastrointestinal:  Negative for abdominal pain and vomiting.   Genitourinary:  Negative for dysuria and hematuria.   Musculoskeletal:  Negative for arthralgias and back pain.   Skin:  Negative " for color change and rash.        Partial second toe amputation right foot   Neurological:  Negative for seizures and syncope.   All other systems reviewed and are negative.      Physical Exam  Constitutional:       Appearance: Normal appearance. She is well-developed and normal weight.   HENT:      Head: Normocephalic and atraumatic.      Nose: Nose normal.      Mouth/Throat:      Mouth: Mucous membranes are moist.      Pharynx: Oropharynx is clear.   Eyes:      Conjunctiva/sclera: Conjunctivae normal.      Pupils: Pupils are equal, round, and reactive to light.   Cardiovascular:      Pulses:           Dorsalis pedis pulses are 2+ on the right side and 2+ on the left side.        Posterior tibial pulses are 2+ on the right side and 2+ on the left side.   Pulmonary:      Effort: Pulmonary effort is normal.   Musculoskeletal:         General: Normal range of motion.      Cervical back: Normal range of motion.      Right lower leg: No edema.      Left lower leg: No edema.   Feet:      Right foot:      Protective Sensation: 10 sites tested.  0 sites sensed.      Skin integrity: Skin integrity normal.      Toenail Condition: Right toenails are normal.      Left foot:      Protective Sensation: 10 sites tested.  0 sites sensed.      Skin integrity: Skin integrity normal.      Toenail Condition: Left toenails are normal.      Comments: Second toe amputation site with sutures intact, incision is coapting well, no edema, erythema or signs of infection noted.  Remainder of bilateral feet are within normal limits with no open wounds.  Skin:     General: Skin is warm and dry.      Capillary Refill: Capillary refill takes less than 2 seconds.   Neurological:      General: No focal deficit present.      Mental Status: She is alert and oriented to person, place, and time. Mental status is at baseline.      Sensory: Sensory deficit present.      Coordination: Coordination abnormal.      Gait: Gait abnormal.   Psychiatric:          Mood and Affect: Mood normal.         Behavior: Behavior normal.         Thought Content: Thought content normal.         Judgment: Judgment normal.              XR ankle 3+ vw left    Result Date: 9/4/2024  Narrative: XR ANKLE 3+ VW LEFT INDICATION: Evaluate previous ankle fracture, compare with prior films. COMPARISON: July 8, 2024 FINDINGS: The previously identified cortical irregularity at the tip of the lateral malleolus appears unchanged from the prior study. No new fractures are seen. No significant degenerative changes. No lytic or blastic osseous lesion. There is some swelling of the ankle which is mild.     Impression: Mild swelling. Stable appearance of cortical irregularity at the tip of the lateral malleolus. Computerized Assisted Algorithm (CAA) may have been used to analyze all applicable images. Workstation performed: [id number]     XR foot right 3+ views    Result Date: 9/4/2024  Narrative: XR FOOT 3+ VW RIGHT INDICATION: s/p partial right second toe amp. COMPARISON: 9/1/2024. FINDINGS: Status post amputation of the second toe distal phalanx. Amputation of the great toe distal phalanx redemonstrated. No acute fracture or dislocation. No significant degenerative changes. No lytic or blastic osseous lesion. Soft tissue defect distal second toe related to the amputation.     Impression: Status post amputation of the second toe distal phalanx. Computerized Assisted Algorithm (CAA) may have been used to analyze all applicable images. Workstation performed: [id number]     MRI foot/forefoot toest right wo contrast    Result Date: 9/3/2024  Narrative: MRI FOOT/FOREFOOT TOES RIGHT WO CONTRAST INDICATION:   Osteomyelitis (pending surgical decision). COMPARISON: Prior MRI study dated 6/6/2024. Correlation is made with the prior radiograph dated 9/1/2024. TECHNIQUE:  Multiplanar/multisequence MR of the right foot was performed. Imaging performed on 1.5T MRI FINDINGS: SUBCUTANEOUS TISSUES: There is ulceration  along the distal second toe. There is minimal dorsal subcutaneous edema. BONES: Status post amputation of the great toe distal phalanx. There is marrow edema and confluent decreased T1 marrow signal throughout the second distal phalanx compatible with osteomyelitis. There is minimal bone marrow edema across the distal aspect of the great toe proximal phalanx without confluent decreased T1 marrow signal to suggest osteomyelitis. FIRST MTP JOINT:  Intact. SESAMOID BONES:  Intact. OTHER ARTICULAR SURFACE:  Normal. PLANTAR FASCIA:  Intact. LISFRANC LIGAMENT:  Intact. FOREFOOT TENDONS: Intact. INTERMETATARSAL REGIONS: No bursitis or Jansen's neuroma. MUSCULATURE: Intact.     Impression: Ulceration distal second toe with osteomyelitis of the second toe distal phalanx. Status post amputation of the great toe distal phalanx. Mild marrow edema seen within the distal aspect of the great toe proximal phalanx without confluent decreased T1 marrow signal to definitively suggest osteomyelitis. There is bone marrow edema is nonspecific. Differential considerations would include early osteomyelitis or stress response. Workstation performed: [id number]     XR foot 3+ views RIGHT    Result Date: 9/2/2024  Narrative: XR FOOT 3+ VW RIGHT INDICATION: eval for osteo. COMPARISON: 8/21/2024 FINDINGS: There is similar to slightly worsened erosive change involving the tuft of the second digit suspicious for osteomyelitis. Patient is status post resection of the first digit at the level of the interphalangeal joint with sharp cortical margins. No significant degenerative changes. No lytic or blastic osseous lesion. Unremarkable soft tissues.     Impression: Erosive change involving the tuft of the second digit is similar to slightly worsened since the prior examination and again most consistent with osteomyelitis. Correlate with pending MRI of the foot. Computerized Assisted Algorithm (CAA) may have been used to analyze all applicable  "images. Workstation performed: [id number]     XR foot 3+ views RIGHT    Result Date: 8/21/2024  Narrative: XR FOOT 3+ VW RIGHT INDICATION: osteo. COMPARISON: 8/5/2024 FINDINGS: Patient is status post resection of the first digit at the level of the interphalangeal joint. Cortical margins are sharp. There is erosive change along the tuft of the second digit, worsened and suspicious for progressive osteomyelitis. There is overlying soft tissue ulceration. No lytic or blastic osseous lesion. Unremarkable soft tissues.     Impression: Osteomyelitis involving the tuft of the second digit, mildly worsened since the prior exam. Computerized Assisted Algorithm (CAA) may have been used to analyze all applicable images. Workstation performed: [id number]               Niru Baldwin DPM, LUCINA, CLAUDIA    Portions of the record may have been created with voice recognition software. Occasional wrong word or \"sound a like\" substitutions may have occurred due to the inherent limitations of voice recognition software. Read the chart carefully and recognize, using context, where substitutions have occurred.  "

## 2024-09-13 ENCOUNTER — OFFICE VISIT (OUTPATIENT)
Dept: PODIATRY | Facility: CLINIC | Age: 69
End: 2024-09-13
Payer: MEDICARE

## 2024-09-13 VITALS
TEMPERATURE: 97.4 F | BODY MASS INDEX: 21.62 KG/M2 | HEART RATE: 61 BPM | DIASTOLIC BLOOD PRESSURE: 58 MMHG | WEIGHT: 122 LBS | HEIGHT: 63 IN | SYSTOLIC BLOOD PRESSURE: 102 MMHG

## 2024-09-13 DIAGNOSIS — Z89.421 STATUS POST AMPUTATION OF LESSER TOE OF RIGHT FOOT (HCC): Primary | ICD-10-CM

## 2024-09-13 DIAGNOSIS — M86.9 OSTEOMYELITIS OF SECOND TOE OF RIGHT FOOT (HCC): ICD-10-CM

## 2024-09-13 PROCEDURE — 99213 OFFICE O/P EST LOW 20 MIN: CPT | Performed by: PODIATRIST

## 2024-09-13 RX ORDER — ACETAMINOPHEN 325 MG/1
650 TABLET ORAL EVERY 6 HOURS PRN
COMMUNITY

## 2024-09-13 RX ORDER — POLYETHYLENE GLYCOL 3350 17 G/17G
17 POWDER, FOR SOLUTION ORAL DAILY
COMMUNITY

## 2024-09-13 RX ORDER — BISACODYL 10 MG
10 SUPPOSITORY, RECTAL RECTAL DAILY
COMMUNITY

## 2024-09-19 ENCOUNTER — TELEPHONE (OUTPATIENT)
Age: 69
End: 2024-09-19

## 2024-09-19 NOTE — TELEPHONE ENCOUNTER
Caller: [facility]    Doctor and/or Office:     #: [phone] ext [phone]    Escalation: Appointment Patient tested positive for covid. Per the [facility] rules, she can't go to appts until Oct 3rd. Please return call to schedule removal of stitches. Thank you

## 2024-10-29 ENCOUNTER — TELEPHONE (OUTPATIENT)
Age: 69
End: 2024-10-29

## 2024-11-05 ENCOUNTER — OFFICE VISIT (OUTPATIENT)
Dept: PSYCHIATRY | Facility: CLINIC | Age: 69
End: 2024-11-05
Payer: MEDICARE

## 2024-11-05 DIAGNOSIS — F41.1 GENERALIZED ANXIETY DISORDER: ICD-10-CM

## 2024-11-05 DIAGNOSIS — F33.0 MDD (MAJOR DEPRESSIVE DISORDER), RECURRENT EPISODE, MILD (HCC): Primary | ICD-10-CM

## 2024-11-05 PROCEDURE — 99214 OFFICE O/P EST MOD 30 MIN: CPT | Performed by: STUDENT IN AN ORGANIZED HEALTH CARE EDUCATION/TRAINING PROGRAM

## 2024-11-08 RX ORDER — ALPRAZOLAM 0.5 MG
TABLET ORAL
Qty: 66 TABLET | Refills: 5 | Status: SHIPPED | OUTPATIENT
Start: 2024-11-08

## 2024-11-08 NOTE — PSYCH
MEDICATION MANAGEMENT NOTE        [facility] - [facility]      Name and Date of Birth:  Finn Oleary 69 y.o. 1955 MRN: [id number]    Date of Visit: November 8, 2024    Reason for Visit: Follow-up visit for medication management       SUBJECTIVE:    Finn Oleary is a 69 y.o. female with past psychiatric history significant for MDD who was personally seen and evaluated today at the [facility] outpatient clinic for follow-up and medication management. Finn states that her depression and anxiety are about the same as our previous appointment.  She denies SI, HI, AVH, delusions, moustapha since we last met.  She does continue to endorse some decreased mood and anxiety due to her recent medical conditions and injuries however she declines referral to dietary for her decreased nutrition that was reported to her hospitalization or other services, believing that she has enough on her plate.  After discussion of risks, benefits, potential side effects, alternatives, we will continue current regimen as is without any changes.  She denies acute mental or physical complaints or concerns at this time and states that she is following with her medical practitioners for her medical issues.        Current Rating Scores:     None completed today.    Review Of Systems:      Constitutional negative   ENT Denies   Cardiovascular negative   Respiratory negative   Gastrointestinal negative   Genitourinary negative   Musculoskeletal knee pain and toe pain   Integumentary negative   Neurological neuropathic pain   Endocrine negative   Other Symptoms none, all other systems are negative       Past Psychiatric History: (unchanged information from previous note copied and italicized) - Information that is bolded has been updated.   See intake      Substance Abuse History: (unchanged information from previous note copied and italicized) - Information that is bolded has been  updated.     See intake    Social History: (unchanged information from previous note copied and italicized) - Information that is bolded has been updated.   See intake      Traumatic History: (unchanged information from previous note copied and italicized) - Information that is bolded has been updated.     See intake      Past Medical History:    Past Medical History:   Diagnosis Date    Anemia     Anxiety     Ascites     Cervical cancer (HCC)     Chronic pain     Cirrhosis (HCC)     CVA (cerebral vascular accident) (HCC)     Depression     Fibromyalgia     Hepatic encephalopathy (HCC)     Opioid dependence in remission (HCC) 11/7/2022        Past Surgical History:   Procedure Laterality Date    COLONOSCOPY  07/14/2015    COLONOSCOPY  03/15/2013    Hemorrhoids    EGD  12/23/2014    HYSTERECTOMY      PA AMPUTATION TOE INTERPHALANGEAL JOINT Right 6/7/2024    Procedure: AMPUTATION RIGHT GREAT TOE;  Surgeon: Niru Baldwin DPM;  Location:  MAIN OR;  Service: Podiatry    PA AMPUTATION TOE INTERPHALANGEAL JOINT Right 9/3/2024    Procedure: AMPUTATION RIGHT 2ND TOE;  Surgeon: Niru Baldwin DPM;  Location: [facility] MAIN OR;  Service: Podiatry     Allergies   Allergen Reactions    Gadolinium     Ibuprofen Other (See Comments)     increased BP    Nsaids     Other Palpitations     All anti depressants       Substance Abuse History:    Social History     Substance and Sexual Activity   Alcohol Use Not Currently    Comment: Had been drinking anywhere from 3-5 drinks of hard alcohol daily for the past 2 years, quit 8/17     Social History     Substance and Sexual Activity   Drug Use Not on file       Social History:    Social History     Socioeconomic History    Marital status:      Spouse name: Not on file    Number of children: Not on file    Years of education: Not on file    Highest education level: Not on file   Occupational History    Not on file   Tobacco Use    Smoking status: Every Day     Passive exposure:  Current    Smokeless tobacco: Never   Vaping Use    Vaping status: Never Used   Substance and Sexual Activity    Alcohol use: Not Currently     Comment: Had been drinking anywhere from 3-5 drinks of hard alcohol daily for the past 2 years, quit 8/17    Drug use: Not on file    Sexual activity: Not on file   Other Topics Concern    Not on file   Social History Narrative    Not on file     Social Determinants of Health     Financial Resource Strain: Not on file   Food Insecurity: No Food Insecurity (9/3/2024)    Nursing - Inadequate Food Risk Classification     Worried About Running Out of Food in the Last Year: Never true     Ran Out of Food in the Last Year: Never true     Ran Out of Food in the Last Year: Not on file   Transportation Needs: No Transportation Needs (9/3/2024)    PRAPARE - Transportation     Lack of Transportation (Medical): No     Lack of Transportation (Non-Medical): No   Physical Activity: Not on file   Stress: Not on file   Social Connections: Not on file   Intimate Partner Violence: Not on file   Housing Stability: Low Risk  (9/3/2024)    Housing Stability Vital Sign     Unable to Pay for Housing in the Last Year: No     Number of Times Moved in the Last Year: 1     Homeless in the Last Year: No       Family Psychiatric History:     Family History   Problem Relation Age of Onset    Lung cancer Mother     Cirrhosis Father     Cancer Sister     Colon polyps Neg Hx     Colon cancer Neg Hx        History Review: The following portions of the patient's history were reviewed and updated as appropriate: allergies, current medications, past family history, past medical history, past social history, past surgical history, and problem list.         OBJECTIVE:     Vital signs in last 24 hours:    There were no vitals filed for this visit.    Mental Status Evaluation:    Appearance age appropriate, casually dressed   Behavior cooperative, mildly anxious   Speech normal rate, normal volume, normal pitch    Mood dysphoric   Affect constricted   Thought Processes organized, goal directed   Associations intact associations   Thought Content no overt delusions   Perceptual Disturbances: no auditory hallucinations, no visual hallucinations   Abnormal Thoughts  Risk Potential Suicidal ideation - None  Homicidal ideation - None  Potential for aggression - No   Orientation oriented to person, place, time/date, and situation   Memory recent and remote memory grossly intact   Consciousness alert and awake   Attention Span Concentration Span attention span and concentration are age appropriate   Intellect appears to be of average intelligence   Insight intact   Judgement intact   Muscle Strength and  Gait decreased muscle strength, slow gait   Motor activity no abnormal movements   Language no difficulty naming common objects, no difficulty repeating a phrase   Fund of Knowledge adequate knowledge of current events  adequate fund of knowledge regarding past history   Pain mild   Pain Scale Did not ask patient to formally rate       Laboratory Results: I have personally reviewed all pertinent laboratory/tests results    Recent Labs (last 2 months):   No visits with results within 2 Month(s) from this visit.   Latest known visit with results is:   No results displayed because visit has over 200 results.          Suicide/Homicide Risk Assessment:    Risk of Harm to Self:  The following ratings are based on assessment at the time of the interview  Historical Risk Factors include: chronic psychiatric problems  Protective Factors: no current suicidal ideation, compliant with medications, compliant with mental health treatment, having a desire to be alive, stable living environment    Risk of Harm to Others:  The following ratings are based on assessment at the time of the interview  Historical Risk Factors include: none.  Protective Factors: no current homicidal ideation    The following interventions are recommended: contracts for  safety at present - agrees to go to ED if feeling unsafe, contracts for safety at present - agrees to call Crisis Intervention Service if feeling unsafe      Lethality Statement:    Based on today's assessment and clinical criteria, Finn Oleary contracts for safety and is not an imminent risk of harm to self or others. Outpatient level of care is deemed appropriate at this current time. Finn understands that if they can no longer contract for safety, they need to call the office or report to their nearest Emergency Room for immediate evaluation. They voiced understanding and agreement to call 911 or head to the nearest ED should they have any physical or mental decompensation whatsoever.       Assessment/Plan:     1.)  MDD  2.)  TOO  3.)    After discussion of risks, benefits, and side effects, alternatives, patient declines to change her medication regimen in any way and will continue to be given 66 tablets/month of Xanax.  Intent is to allow patient twice daily doses with an additional 6 doses in case of particularly difficult days.  She voices understanding and agreement not to take 3 times per day for more than 6 times.  Her supply accommodates only 6 extra days of 3 times daily versus the usual twice daily            Medications Risks/Benefits      Risks, Benefits And Possible Side Effects Of Medications:    Risks, benefits, and possible side effects of medications explained to Finn and she verbalizes understanding and agreement for treatment.    Controlled Medication Discussion:     Finn has been filling controlled prescriptions on time as prescribed according to [place] Prescription Drug Monitoring Program    Psychotherapy Provided:     Individual psychotherapy provided: Importance of medication and treatment compliance reviewed with Finn.  Importance of follow up with family physician for medical issues reviewed with Finn.  Crisis/safety plan discussed with Finn.     Treatment Plan:    Completed and  signed during the session:  We will perform in next appointment due to lack of time today      Visit Time    Visit Start Time: 2:00 PM  Visit Stop Time: 2:20 PM  Total Visit Duration:  20 minutes     The total visit duration detailed above includes: patient engagement, medication management, psychotherapy/counseling, discussion regarding treatment goals, documentation, review of past medical records, and coordination of care.      Note Share Disclaimer:     This note was not shared with the patient due to reasonable likelihood of causing patient harm      Ana Paula Cadena DO  Psychiatry  11/08/24

## 2025-02-04 ENCOUNTER — TELEPHONE (OUTPATIENT)
Dept: PSYCHIATRY | Facility: CLINIC | Age: 70
End: 2025-02-04

## 2025-02-04 NOTE — TELEPHONE ENCOUNTER
Finn missed her appointment with Dr. Cadena today. She has been scheduled for March 11th. Let her know to please call if she needs refills or anything else.

## 2025-04-16 ENCOUNTER — OFFICE VISIT (OUTPATIENT)
Dept: PSYCHIATRY | Facility: CLINIC | Age: 70
End: 2025-04-16
Payer: COMMERCIAL

## 2025-04-16 DIAGNOSIS — F41.1 GENERALIZED ANXIETY DISORDER: ICD-10-CM

## 2025-04-16 DIAGNOSIS — F33.0 MDD (MAJOR DEPRESSIVE DISORDER), RECURRENT EPISODE, MILD (HCC): Primary | ICD-10-CM

## 2025-04-16 PROCEDURE — 90833 PSYTX W PT W E/M 30 MIN: CPT | Performed by: STUDENT IN AN ORGANIZED HEALTH CARE EDUCATION/TRAINING PROGRAM

## 2025-04-16 PROCEDURE — 99214 OFFICE O/P EST MOD 30 MIN: CPT | Performed by: STUDENT IN AN ORGANIZED HEALTH CARE EDUCATION/TRAINING PROGRAM

## 2025-04-21 ENCOUNTER — TELEPHONE (OUTPATIENT)
Dept: PSYCHIATRY | Facility: CLINIC | Age: 70
End: 2025-04-21

## 2025-04-21 NOTE — TELEPHONE ENCOUNTER
Left voicemail informing patient and/or parent/guardian of the Psych Encounter form needing to be signed as a requirement from the insurance company for billing purposes. Patient can access form via [employer] and sign electronically.     Please make patient aware this form must be signed for each visit as a requirement to continue future visits with provider.

## 2025-04-23 RX ORDER — ALPRAZOLAM 0.5 MG
TABLET ORAL
Qty: 66 TABLET | Refills: 5 | Status: SHIPPED | OUTPATIENT
Start: 2025-04-23

## 2025-04-23 NOTE — PSYCH
MEDICATION MANAGEMENT NOTE        [facility] - [facility]      Name and Date of Birth:  Finn Oleary 69 y.o. 1955 MRN: [id number]    Date of Visit: April 23, 2025    Reason for Visit: Follow-up visit for medication management       SUBJECTIVE:    Finn Oleary is a 69 y.o. female with past psychiatric history significant for MDD who was personally seen and evaluated today at the [facility] outpatient clinic for follow-up and medication management. Finn states that her depression and anxiety are about the same as our previous appointment.  She denies SI, HI, AVH, delusions, moustapha since we last met.  Her main worries continue to be her chronic medical conditions such as her chronic pain.  After discussion of risks, benefits, potential side effects, alternatives, we will continue on current regimen as is without any changes and patient will continue to follow with her other medical specialists for treatment of her chronic medical conditions and will be referred to clinical navigation for connection with any support services.  She denies acute mental or physical issues at this time        Current Rating Scores:     None completed today.    Review Of Systems:      Constitutional negative   ENT Denies   Cardiovascular negative   Respiratory negative   Gastrointestinal negative   Genitourinary negative   Musculoskeletal knee pain and toe pain   Integumentary negative   Neurological neuropathic pain   Endocrine negative   Other Symptoms none, all other systems are negative       Past Psychiatric History: (unchanged information from previous note copied and italicized) - Information that is bolded has been updated.   See intake      Substance Abuse History: (unchanged information from previous note copied and italicized) - Information that is bolded has been updated.     See intake    Social History: (unchanged information from previous note copied and  italicized) - Information that is bolded has been updated.   See intake      Traumatic History: (unchanged information from previous note copied and italicized) - Information that is bolded has been updated.     See intake      Past Medical History:    Past Medical History:   Diagnosis Date    Anemia     Anxiety     Ascites     Cervical cancer (HCC)     Chronic pain     Cirrhosis (HCC)     CVA (cerebral vascular accident) (HCC)     Depression     Fibromyalgia     Hepatic encephalopathy (HCC)     Opioid dependence in remission (HCC) 11/7/2022        Past Surgical History:   Procedure Laterality Date    COLONOSCOPY  07/14/2015    COLONOSCOPY  03/15/2013    Hemorrhoids    EGD  12/23/2014    HYSTERECTOMY      MD AMPUTATION TOE INTERPHALANGEAL JOINT Right 6/7/2024    Procedure: AMPUTATION RIGHT GREAT TOE;  Surgeon: Niru Baldwin DPM;  Location: [facility] MAIN OR;  Service: Podiatry    MD AMPUTATION TOE INTERPHALANGEAL JOINT Right 9/3/2024    Procedure: AMPUTATION RIGHT 2ND TOE;  Surgeon: Niru Baldwin DPM;  Location: [facility] MAIN OR;  Service: Podiatry     Allergies   Allergen Reactions    Gadolinium     Ibuprofen Other (See Comments)     increased BP    Nsaids     Other Palpitations     All anti depressants       Substance Abuse History:    Social History     Substance and Sexual Activity   Alcohol Use Not Currently    Comment: Had been drinking anywhere from 3-5 drinks of hard alcohol daily for the past 2 years, quit 8/17     Social History     Substance and Sexual Activity   Drug Use Not on file       Social History:    Social History     Socioeconomic History    Marital status:      Spouse name: Not on file    Number of children: Not on file    Years of education: Not on file    Highest education level: Not on file   Occupational History    Not on file   Tobacco Use    Smoking status: Every Day     Passive exposure: Current    Smokeless tobacco: Never   Vaping Use    Vaping status: Never Used   Substance and Sexual  Activity    Alcohol use: Not Currently     Comment: Had been drinking anywhere from 3-5 drinks of hard alcohol daily for the past 2 years, quit 8/17    Drug use: Not on file    Sexual activity: Not on file   Other Topics Concern    Not on file   Social History Narrative    Not on file     Social Drivers of Health     Financial Resource Strain: Not on file   Food Insecurity: No Food Insecurity (9/3/2024)    Nursing - Inadequate Food Risk Classification     Worried About Running Out of Food in the Last Year: Never true     Ran Out of Food in the Last Year: Never true     Ran Out of Food in the Last Year: Not on file   Transportation Needs: No Transportation Needs (9/3/2024)    PRAPARE - Transportation     Lack of Transportation (Medical): No     Lack of Transportation (Non-Medical): No   Physical Activity: Not on file   Stress: Not on file   Social Connections: Not on file   Intimate Partner Violence: Not on file   Housing Stability: Low Risk  (9/3/2024)    Housing Stability Vital Sign     Unable to Pay for Housing in the Last Year: No     Number of Times Moved in the Last Year: 1     Homeless in the Last Year: No       Family Psychiatric History:     Family History   Problem Relation Age of Onset    Lung cancer Mother     Cirrhosis Father     Cancer Sister     Colon polyps Neg Hx     Colon cancer Neg Hx        History Review: The following portions of the patient's history were reviewed and updated as appropriate: allergies, current medications, past family history, past medical history, past social history, past surgical history, and problem list.         OBJECTIVE:     Vital signs in last 24 hours:    There were no vitals filed for this visit.    Mental Status Evaluation:    Appearance age appropriate, casually dressed   Behavior cooperative, mildly anxious   Speech normal rate, normal volume, normal pitch   Mood dysphoric   Affect constricted   Thought Processes organized, goal directed   Associations intact  associations   Thought Content no overt delusions   Perceptual Disturbances: no auditory hallucinations, no visual hallucinations   Abnormal Thoughts  Risk Potential Suicidal ideation - None  Homicidal ideation - None  Potential for aggression - No   Orientation oriented to person, place, time/date, and situation   Memory recent and remote memory grossly intact   Consciousness alert and awake   Attention Span Concentration Span attention span and concentration are age appropriate   Intellect appears to be of average intelligence   Insight intact   Judgement intact   Muscle Strength and  Gait decreased muscle strength, slow gait   Motor activity no abnormal movements   Language no difficulty naming common objects, no difficulty repeating a phrase   Fund of Knowledge adequate knowledge of current events  adequate fund of knowledge regarding past history   Pain mild   Pain Scale Did not ask patient to formally rate       Laboratory Results: I have personally reviewed all pertinent laboratory/tests results    Recent Labs (last 2 months):   No visits with results within 2 Month(s) from this visit.   Latest known visit with results is:   No results displayed because visit has over 200 results.          Suicide/Homicide Risk Assessment:    Risk of Harm to Self:  The following ratings are based on assessment at the time of the interview  Historical Risk Factors include: chronic psychiatric problems  Protective Factors: no current suicidal ideation, compliant with medications, compliant with mental health treatment, having a desire to be alive, stable living environment    Risk of Harm to Others:  The following ratings are based on assessment at the time of the interview  Historical Risk Factors include: none.  Protective Factors: no current homicidal ideation    The following interventions are recommended: contracts for safety at present - agrees to go to ED if feeling unsafe, contracts for safety at present - agrees to  call Crisis Intervention Service if feeling unsafe      Lethality Statement:    Based on today's assessment and clinical criteria, Finn Oleary contracts for safety and is not an imminent risk of harm to self or others. Outpatient level of care is deemed appropriate at this current time. Finn understands that if they can no longer contract for safety, they need to call the office or report to their nearest Emergency Room for immediate evaluation. They voiced understanding and agreement to call 911 or head to the nearest ED should they have any physical or mental decompensation whatsoever.       Assessment/Plan:     1.)  MDD  2.)  TOO  3.)    After discussion of risks, benefits, and side effects, alternatives, patient declines to change her medication regimen in any way and will continue to be given 66 tablets/month of Xanax.  Intent is to allow patient twice daily doses with an additional 6 doses in case of particularly difficult days.  She voices understanding and agreement not to take 3 times per day for more than 6 times.  Her supply accommodates only 6 extra days of 3 times daily versus the usual twice daily.  She will follow up with resources given to her by clinical navigation and will follow up with her other medical specialists.            Medications Risks/Benefits      Risks, Benefits And Possible Side Effects Of Medications:    Risks, benefits, and possible side effects of medications explained to Finn and she verbalizes understanding and agreement for treatment.    Controlled Medication Discussion:     Finn has been filling controlled prescriptions on time as prescribed according to [place] Prescription Drug Monitoring Program    Psychotherapy Provided:     Individual psychotherapy provided: Importance of medication and treatment compliance reviewed with Finn.  Importance of follow up with family physician for medical issues reviewed with Finn.  Crisis/safety plan discussed with Finn.   Provided  at least 16 minutes of distinct psychotherapy using a combination of supportive, interpersonal, motivational, and problem solving approaches to address psychological distress and enhance coping strategies.       Treatment Plan:    Completed and signed during the session:  We will perform in next appointment due to lack of time today      Visit Time    Visit Start Time: 10:30 AM  Visit Stop Time: 10:58 AM  Total Visit Duration:  28 minutes     The total visit duration detailed above includes: patient engagement, medication management, psychotherapy/counseling, discussion regarding treatment goals, documentation, review of past medical records, and coordination of care.      Note Share Disclaimer:     This note was not shared with the patient due to reasonable likelihood of causing patient harm      Ana Paula Cadena DO  Psychiatry  04/23/25

## 2025-04-28 ENCOUNTER — TELEPHONE (OUTPATIENT)
Dept: PSYCHIATRY | Facility: CLINIC | Age: 70
End: 2025-04-28

## 2025-06-04 ENCOUNTER — TELEPHONE (OUTPATIENT)
Dept: PSYCHIATRY | Facility: CLINIC | Age: 70
End: 2025-06-04

## 2025-06-04 NOTE — TELEPHONE ENCOUNTER
Called and left message for client to obtain and complete MSPQ (Medicare Questionnaire) so appt. on 4/16/25 with Dr. Cadena may be billed.

## 2025-06-29 NOTE — TELEPHONE ENCOUNTER
"Regarding: Pre surgery question  ----- Message from Amy GREGORY sent at 9/1/2024  4:50 PM EDT -----  Pt stated, \" I have surgery Tuesday, I am supposed to go to the hospital today but my son is not answering his phone so I do not have a ride. What should I do?\"    " CT chest with L supraclavicular, mediastinal and hilar lymphadenopathy  - d/w pt and family re concern for cancer, wish to proceed with further eval.   - s/p L supraclavicular bx. --> path showing adenocarcinoma, pending stains --> oncology recommends outpatient CT/PET scan and cancer navigation

## 2025-07-05 ENCOUNTER — APPOINTMENT (EMERGENCY)
Dept: RADIOLOGY | Facility: HOSPITAL | Age: 70
End: 2025-07-05
Payer: MEDICARE

## 2025-07-05 ENCOUNTER — HOSPITAL ENCOUNTER (EMERGENCY)
Facility: HOSPITAL | Age: 70
Discharge: HOME | End: 2025-07-05
Attending: EMERGENCY MEDICINE | Admitting: EMERGENCY MEDICINE
Payer: MEDICARE

## 2025-07-05 VITALS
WEIGHT: 123.68 LBS | TEMPERATURE: 97 F | OXYGEN SATURATION: 86 % | BODY MASS INDEX: 21.91 KG/M2 | HEART RATE: 70 BPM | DIASTOLIC BLOOD PRESSURE: 77 MMHG | SYSTOLIC BLOOD PRESSURE: 140 MMHG | RESPIRATION RATE: 35 BRPM

## 2025-07-05 DIAGNOSIS — L03.031 CELLULITIS OF GREAT TOE, RIGHT: Primary | ICD-10-CM

## 2025-07-05 LAB
ALBUMIN SERPL BCG-MCNC: 3.4 G/DL (ref 3.5–5)
ALP SERPL-CCNC: 108 U/L (ref 34–104)
ALT SERPL W P-5'-P-CCNC: 14 U/L (ref 7–52)
ANION GAP SERPL CALCULATED.3IONS-SCNC: 4 MMOL/L (ref 4–13)
APTT PPP: 32 SECONDS (ref 23–34)
AST SERPL W P-5'-P-CCNC: 31 U/L (ref 13–39)
BASOPHILS # BLD AUTO: 0.05 THOUSANDS/ÂΜL (ref 0–0.1)
BASOPHILS NFR BLD AUTO: 1 % (ref 0–1)
BILIRUB SERPL-MCNC: 1.35 MG/DL (ref 0.2–1)
BILIRUB UR QL STRIP: NEGATIVE
BUN SERPL-MCNC: 6 MG/DL (ref 5–25)
CALCIUM ALBUM COR SERPL-MCNC: 10.5 MG/DL (ref 8.3–10.1)
CALCIUM SERPL-MCNC: 10 MG/DL (ref 8.4–10.2)
CARDIAC TROPONIN I PNL SERPL HS: 3 NG/L (ref ?–50)
CHLORIDE SERPL-SCNC: 102 MMOL/L (ref 96–108)
CLARITY UR: CLEAR
CO2 SERPL-SCNC: 35 MMOL/L (ref 21–32)
COLOR UR: ABNORMAL
CREAT SERPL-MCNC: 0.6 MG/DL (ref 0.6–1.3)
CRP SERPL QL: 6.9 MG/L
EOSINOPHIL # BLD AUTO: 0.35 THOUSAND/ÂΜL (ref 0–0.61)
EOSINOPHIL NFR BLD AUTO: 8 % (ref 0–6)
ERYTHROCYTE [DISTWIDTH] IN BLOOD BY AUTOMATED COUNT: 12.6 % (ref 11.6–15.1)
ERYTHROCYTE [SEDIMENTATION RATE] IN BLOOD: 7 MM/HOUR (ref 0–29)
GFR SERPL CREATININE-BSD FRML MDRD: 92 ML/MIN/1.73SQ M
GLUCOSE SERPL-MCNC: 107 MG/DL (ref 65–140)
GLUCOSE UR STRIP-MCNC: NEGATIVE MG/DL
HCT VFR BLD AUTO: 43.8 % (ref 34.8–46.1)
HGB BLD-MCNC: 14.1 G/DL (ref 11.5–15.4)
HGB UR QL STRIP.AUTO: NEGATIVE
IMM GRANULOCYTES # BLD AUTO: 0.01 THOUSAND/UL (ref 0–0.2)
IMM GRANULOCYTES NFR BLD AUTO: 0 % (ref 0–2)
INR PPP: 1.23 (ref 0.85–1.19)
KETONES UR STRIP-MCNC: NEGATIVE MG/DL
LACTATE SERPL-SCNC: 1.7 MMOL/L (ref 0.5–2)
LEUKOCYTE ESTERASE UR QL STRIP: NEGATIVE
LYMPHOCYTES # BLD AUTO: 1.43 THOUSANDS/ÂΜL (ref 0.6–4.47)
LYMPHOCYTES NFR BLD AUTO: 31 % (ref 14–44)
MCH RBC QN AUTO: 32.1 PG (ref 26.8–34.3)
MCHC RBC AUTO-ENTMCNC: 32.2 G/DL (ref 31.4–37.4)
MCV RBC AUTO: 100 FL (ref 82–98)
MONOCYTES # BLD AUTO: 0.51 THOUSAND/ÂΜL (ref 0.17–1.22)
MONOCYTES NFR BLD AUTO: 11 % (ref 4–12)
NEUTROPHILS # BLD AUTO: 2.3 THOUSANDS/ÂΜL (ref 1.85–7.62)
NEUTS SEG NFR BLD AUTO: 49 % (ref 43–75)
NITRITE UR QL STRIP: NEGATIVE
NRBC BLD AUTO-RTO: 0 /100 WBCS
PH UR STRIP.AUTO: 8 [PH]
PLATELET # BLD AUTO: 176 THOUSANDS/UL (ref 149–390)
PMV BLD AUTO: 9.7 FL (ref 8.9–12.7)
POTASSIUM SERPL-SCNC: 4.1 MMOL/L (ref 3.5–5.3)
PROCALCITONIN SERPL-MCNC: <0.05 NG/ML
PROT SERPL-MCNC: 6.7 G/DL (ref 6.4–8.4)
PROT UR STRIP-MCNC: NEGATIVE MG/DL
PROTHROMBIN TIME: 16 SECONDS (ref 12.3–15)
RBC # BLD AUTO: 4.39 MILLION/UL (ref 3.81–5.12)
SODIUM SERPL-SCNC: 141 MMOL/L (ref 135–147)
SP GR UR STRIP.AUTO: <1.005 (ref 1–1.03)
UROBILINOGEN UR STRIP-ACNC: <2 MG/DL
WBC # BLD AUTO: 4.65 THOUSAND/UL (ref 4.31–10.16)

## 2025-07-05 PROCEDURE — 71045 X-RAY EXAM CHEST 1 VIEW: CPT

## 2025-07-05 PROCEDURE — 85652 RBC SED RATE AUTOMATED: CPT | Performed by: PHYSICIAN ASSISTANT

## 2025-07-05 PROCEDURE — 83605 ASSAY OF LACTIC ACID: CPT | Performed by: PHYSICIAN ASSISTANT

## 2025-07-05 PROCEDURE — 96375 TX/PRO/DX INJ NEW DRUG ADDON: CPT

## 2025-07-05 PROCEDURE — 99285 EMERGENCY DEPT VISIT HI MDM: CPT | Performed by: PHYSICIAN ASSISTANT

## 2025-07-05 PROCEDURE — 85730 THROMBOPLASTIN TIME PARTIAL: CPT | Performed by: PHYSICIAN ASSISTANT

## 2025-07-05 PROCEDURE — 96365 THER/PROPH/DIAG IV INF INIT: CPT

## 2025-07-05 PROCEDURE — 93005 ELECTROCARDIOGRAM TRACING: CPT

## 2025-07-05 PROCEDURE — 94640 AIRWAY INHALATION TREATMENT: CPT

## 2025-07-05 PROCEDURE — 85610 PROTHROMBIN TIME: CPT | Performed by: PHYSICIAN ASSISTANT

## 2025-07-05 PROCEDURE — 86140 C-REACTIVE PROTEIN: CPT | Performed by: PHYSICIAN ASSISTANT

## 2025-07-05 PROCEDURE — 85025 COMPLETE CBC W/AUTO DIFF WBC: CPT | Performed by: PHYSICIAN ASSISTANT

## 2025-07-05 PROCEDURE — 81003 URINALYSIS AUTO W/O SCOPE: CPT | Performed by: PHYSICIAN ASSISTANT

## 2025-07-05 PROCEDURE — 36415 COLL VENOUS BLD VENIPUNCTURE: CPT | Performed by: PHYSICIAN ASSISTANT

## 2025-07-05 PROCEDURE — 80053 COMPREHEN METABOLIC PANEL: CPT | Performed by: PHYSICIAN ASSISTANT

## 2025-07-05 PROCEDURE — 87040 BLOOD CULTURE FOR BACTERIA: CPT | Performed by: PHYSICIAN ASSISTANT

## 2025-07-05 PROCEDURE — 73620 X-RAY EXAM OF FOOT: CPT

## 2025-07-05 PROCEDURE — 84145 PROCALCITONIN (PCT): CPT | Performed by: PHYSICIAN ASSISTANT

## 2025-07-05 PROCEDURE — 84484 ASSAY OF TROPONIN QUANT: CPT | Performed by: PHYSICIAN ASSISTANT

## 2025-07-05 PROCEDURE — 99284 EMERGENCY DEPT VISIT MOD MDM: CPT

## 2025-07-05 RX ORDER — ALBUTEROL SULFATE 5 MG/ML
5 SOLUTION RESPIRATORY (INHALATION) ONCE
Status: COMPLETED | OUTPATIENT
Start: 2025-07-05 | End: 2025-07-05

## 2025-07-05 RX ORDER — ACETAMINOPHEN 10 MG/ML
1000 INJECTION, SOLUTION INTRAVENOUS ONCE
Status: DISCONTINUED | OUTPATIENT
Start: 2025-07-05 | End: 2025-07-05

## 2025-07-05 RX ORDER — MORPHINE SULFATE 4 MG/ML
4 INJECTION, SOLUTION INTRAMUSCULAR; INTRAVENOUS ONCE
Status: COMPLETED | OUTPATIENT
Start: 2025-07-05 | End: 2025-07-05

## 2025-07-05 RX ADMIN — AMOXICILLIN AND CLAVULANATE POTASSIUM 1 TABLET: 875; 125 TABLET, COATED ORAL at 19:49

## 2025-07-05 RX ADMIN — MORPHINE SULFATE 4 MG: 4 INJECTION INTRAVENOUS at 17:56

## 2025-07-05 RX ADMIN — IPRATROPIUM BROMIDE 0.5 MG: 0.5 SOLUTION RESPIRATORY (INHALATION) at 20:20

## 2025-07-05 RX ADMIN — ALBUTEROL SULFATE 5 MG: 2.5 SOLUTION RESPIRATORY (INHALATION) at 20:20

## 2025-07-05 RX ADMIN — Medication 500 MG: at 19:26

## 2025-07-05 NOTE — ED PROVIDER NOTES
Time reflects when diagnosis was documented in both MDM as applicable and the Disposition within this note       Time User Action Codes Description Comment    7/5/2025  6:56 PM Nuria Caicedo [L03.031] Cellulitis of great toe, right           ED Disposition       ED Disposition   Discharge    Condition   Stable    Date/Time   Sat Jul 5, 2025  7:47 PM    Comment   Finn Oleary discharge to home/self care.                   Assessment & Plan       Medical Decision Making  Patient with right great toe pain for a few days, h/o osteo, will order labs, xray  to r/o electrolyte abnormality, leukocytosis, fracture on xray or signs of osteo.  Discussed results with Dr. Hernandez, he suggests d/c on augmentin with close f/u with podiatry and wound care.  Return precautions given.     Amount and/or Complexity of Data Reviewed  Labs: ordered.  Radiology: ordered and independent interpretation performed.  Discussion of management or test interpretation with external provider(s): D/w podiatry.     Risk  Prescription drug management.        ED Course as of 07/05/25 1957   Sat Jul 05, 2025   1855 Messaged podiatry concerning cellulitis right great toe.   1939 Discussed case with Dr. Hernandez,    1940 Discussed with Dr. Hernandez, Will d/c on augmentin and close f/u with podiatry and wound care.        Medications   morphine injection 4 mg (4 mg Intravenous Given 7/5/25 1756)   acetaminophen (Ofirmev) IVPB 500 mg (0 mg Intravenous Stopped 7/5/25 1949)   amoxicillin-clavulanate (AUGMENTIN) 875-125 mg per tablet 1 tablet (1 tablet Oral Given 7/5/25 1949)       ED Risk Strat Scores                    No data recorded                            History of Present Illness       Chief Complaint   Patient presents with    Foot Pain     Pt arrives via EMS c/o right foot pain since this morning. EMS reports pt is failure to thrive at home alone, noncompliant with medications. Pt reports her palliative care doctor doesn't want her on  lasix. A&Ox4       Past Medical History[1]   Past Surgical History[2]   Family History[3]   Social History[4]   E-Cigarette/Vaping    E-Cigarette Use Never User       E-Cigarette/Vaping Substances    Nicotine No     THC No     CBD No     Flavoring No     Other No     Unknown No       I have reviewed and agree with the history as documented.     Patient is a 68 y/o F BIBA from home for right great toe pain.  Patient states she noticed her right great toe has been red and swollen.  She denies trauma to her foot.  She has h/o partial amputation of right great toe.  There are stitches in right second toe, patient is unsure how long the stitches have been there.  She has swelling b/l legs.  No chest pain, but she has SOB.  She has been using her inhalers as prescribed. No fever/chills.  No chest pain.        History provided by:  Patient      Review of Systems   Constitutional:  Negative for chills and fever.   Musculoskeletal:         Right great toe pain   Skin:  Positive for color change.   Neurological:  Negative for dizziness, weakness and numbness.   All other systems reviewed and are negative.          Objective       ED Triage Vitals   Temperature Pulse Blood Pressure Respirations SpO2 Patient Position - Orthostatic VS   07/05/25 1728 07/05/25 1728 07/05/25 1728 07/05/25 1728 07/05/25 1728 07/05/25 1728   (!) 97 °F (36.1 °C) 63 137/99 20 93 % Sitting      Temp Source Heart Rate Source BP Location FiO2 (%) Pain Score    07/05/25 1728 07/05/25 1728 07/05/25 1728 -- 07/05/25 1756    Temporal Monitor Right arm  9      Vitals      Date and Time Temp Pulse SpO2 Resp BP Pain Score FACES Pain Rating User   07/05/25 1756 -- -- -- -- -- 9 -- JAZMYNE   07/05/25 1728 97 °F (36.1 °C) 63 93 % 20 137/99 -- -- JAZMYNE            Physical Exam  Vitals and nursing note reviewed.   Constitutional:       General: She is not in acute distress.     Appearance: Normal appearance. She is well-developed and well-groomed. She is not ill-appearing  or diaphoretic.   HENT:      Head: Normocephalic and atraumatic.      Right Ear: Hearing normal.      Left Ear: Hearing normal.      Nose: Nose normal.     Eyes:      Conjunctiva/sclera: Conjunctivae normal.       Cardiovascular:      Rate and Rhythm: Normal rate and regular rhythm.      Pulses:           Dorsalis pedis pulses are 1+ on the right side and 1+ on the left side.      Heart sounds: Normal heart sounds.   Pulmonary:      Effort: Pulmonary effort is normal.      Breath sounds: Decreased breath sounds present. No wheezing, rhonchi or rales.     Musculoskeletal:      Cervical back: Normal range of motion.      Comments: Right great toe erythema, swelling, tenderness.  Wound to bottom of toe.    Stitches in right second toe.      Skin:     General: Skin is warm and dry.      Findings: Erythema (right great toe.) present.     Neurological:      Mental Status: She is alert and oriented to person, place, and time.      Sensory: Sensation is intact.      Motor: Motor function is intact. No weakness.     Psychiatric:         Behavior: Behavior is cooperative.               Results Reviewed       Procedure Component Value Units Date/Time    UA w Reflex to Microscopic w Reflex to Culture [[id number]]  (Abnormal) Collected: 07/05/25 1927    Lab Status: Final result Specimen: Urine, Clean Catch Updated: 07/05/25 1939     Color, UA Light Yellow     Clarity, UA Clear     Specific Gravity, UA <1.005     pH, UA 8.0     Leukocytes, UA Negative     Nitrite, UA Negative     Protein, UA Negative mg/dl      Glucose, UA Negative mg/dl      Ketones, UA Negative mg/dl      Urobilinogen, UA <2.0 mg/dl      Bilirubin, UA Negative     Occult Blood, UA Negative    Sedimentation rate, automated [[id number]]  (Normal) Collected: 07/05/25 1758    Lab Status: Final result Specimen: Blood from Arm, Left Updated: 07/05/25 1840     Sed Rate 7 mm/hour     Protime-INR [[id number]]  (Abnormal) Collected: 07/05/25 1758    Lab Status: Final result  Specimen: Blood from Arm, Left Updated: 07/05/25 1832     Protime 16.0 seconds      INR 1.23    Narrative:      INR Therapeutic Range    Indication                                             INR Range      Atrial Fibrillation                                               2.0-3.0  Hypercoagulable State                                    2.0.2.3  Left Ventricular Asist Device                            2.0-3.0  Mechanical Heart Valve                                  -    Aortic(with afib, MI, embolism, HF, LA enlargement,    and/or coagulopathy)                                     2.0-3.0 (2.5-3.5)     Mitral                                                             2.5-3.5  Prosthetic/Bioprosthetic Heart Valve               2.0-3.0  Venous thromboembolism (VTE: VT, PE        2.0-3.0    APTT [[id number]]  (Normal) Collected: 07/05/25 1758    Lab Status: Final result Specimen: Blood from Arm, Left Updated: 07/05/25 1832     PTT 32 seconds     Procalcitonin [[id number]]  (Normal) Collected: 07/05/25 1758    Lab Status: Final result Specimen: Blood from Arm, Left Updated: 07/05/25 1832     Procalcitonin <0.05 ng/ml     HS Troponin 0hr (reflex protocol) [[id number]]  (Normal) Collected: 07/05/25 1758    Lab Status: Final result Specimen: Blood from Arm, Left Updated: 07/05/25 1829     hs TnI 0hr 3 ng/L     Comprehensive metabolic panel [[id number]]  (Abnormal) Collected: 07/05/25 1758    Lab Status: Final result Specimen: Blood from Arm, Left Updated: 07/05/25 1821     Sodium 141 mmol/L      Potassium 4.1 mmol/L      Chloride 102 mmol/L      CO2 35 mmol/L      ANION GAP 4 mmol/L      BUN 6 mg/dL      Creatinine 0.60 mg/dL      Glucose 107 mg/dL      Calcium 10.0 mg/dL      Corrected Calcium 10.5 mg/dL      AST 31 U/L      ALT 14 U/L      Alkaline Phosphatase 108 U/L      Total Protein 6.7 g/dL      Albumin 3.4 g/dL      Total Bilirubin 1.35 mg/dL      eGFR 92 ml/min/1.73sq m     Narrative:      National Kidney Disease  Foundation guidelines for Chronic Kidney Disease (CKD):     Stage 1 with normal or high GFR (GFR > 90 mL/min/1.73 square meters)    Stage 2 Mild CKD (GFR = 60-89 mL/min/1.73 square meters)    Stage 3A Moderate CKD (GFR = 45-59 mL/min/1.73 square meters)    Stage 3B Moderate CKD (GFR = 30-44 mL/min/1.73 square meters)    Stage 4 Severe CKD (GFR = 15-29 mL/min/1.73 square meters)    Stage 5 End Stage CKD (GFR <15 mL/min/1.73 square meters)  Note: GFR calculation is accurate only with a steady state creatinine    C-reactive protein [[id number]]  (Abnormal) Collected: 07/05/25 1758    Lab Status: Final result Specimen: Blood from Arm, Left Updated: 07/05/25 1821     CRP 6.9 mg/L     Lactic acid, plasma (w/reflex if result > 2.0) [[id number]]  (Normal) Collected: 07/05/25 1758    Lab Status: Final result Specimen: Blood from Arm, Left Updated: 07/05/25 1820     LACTIC ACID 1.7 mmol/L     Narrative:      Result may be elevated if tourniquet was used during collection.    Blood culture #2 [[id number]] Collected: 07/05/25 1758    Lab Status: In process Specimen: Blood from Arm, Left Updated: 07/05/25 1819    Blood culture #1 [[id number]] Collected: 07/05/25 1758    Lab Status: In process Specimen: Blood from Arm, Left Updated: 07/05/25 1819    CBC and differential [[id number]]  (Abnormal) Collected: 07/05/25 1758    Lab Status: Final result Specimen: Blood from Arm, Left Updated: 07/05/25 1806     WBC 4.65 Thousand/uL      RBC 4.39 Million/uL      Hemoglobin 14.1 g/dL      Hematocrit 43.8 %       fL      MCH 32.1 pg      MCHC 32.2 g/dL      RDW 12.6 %      MPV 9.7 fL      Platelets 176 Thousands/uL      nRBC 0 /100 WBCs      Segmented % 49 %      Immature Grans % 0 %      Lymphocytes % 31 %      Monocytes % 11 %      Eosinophils Relative 8 %      Basophils Relative 1 %      Absolute Neutrophils 2.30 Thousands/µL      Absolute Immature Grans 0.01 Thousand/uL      Absolute Lymphocytes 1.43 Thousands/µL      Absolute  Monocytes 0.51 Thousand/µL      Eosinophils Absolute 0.35 Thousand/µL      Basophils Absolute 0.05 Thousands/µL             XR foot 2 vw right   ED Interpretation by Nuria Caicedo PA-C (07/05 1846)   No acute fracture, no signs of osteomyelitis      XR chest 1 view portable    (Results Pending)   XR foot 2 vw left    (Results Pending)       Procedures    ED Medication and Procedure Management   Prior to Admission Medications   Prescriptions Last Dose Informant Patient Reported? Taking?   ALPRAZolam (XANAX) 0.5 mg tablet   No No   Sig: Take 2 tablets of Xanax per day for severe anxiety.  For particularly severe anxiety you may take a maximum of 3 tablets.  Do not use medication 3 times a day more than 6 days/month.   Ipratropium-Albuterol (COMBIVENT IN)  Self Yes No   Sig: Inhale 2 (two) times a day   Ipratropium-Albuterol (COMBIVENT RESPIMAT IN)   Yes No   Sig: Inhale 2 Inhalations 2 (two) times a day   Melatonin 10 MG TABS   Yes No   Sig: Take by mouth   acetaminophen (TYLENOL) 325 mg tablet  Outside Facility (Specify) Yes No   Sig: Take 650 mg by mouth every 6 (six) hours as needed for mild pain   albuterol (PROVENTIL HFA,VENTOLIN HFA) 90 mcg/act inhaler   Yes No   Sig: INHALE 2 PUFFS EVERY 6 HOURS AS NEEDED FOR SHORTNESS OF BREATH OR WHEEZING   bisacodyl (Dulcolax) 10 mg suppository  Outside Facility (Specify) Yes No   Sig: Insert 10 mg into the rectum daily For constipation if no results from MOM or Miralax by next shift   folic acid (FOLVITE) 1 mg tablet  Self Yes No   Sig: Take by mouth daily   furosemide (LASIX) 20 mg tablet Not Taking  No No   Sig: Take 20 mg daily alternating with 40 mg daily.   Patient not taking: Reported on 7/5/2025   gabapentin (NEURONTIN) 300 mg capsule 7/5/2025  No Yes   Sig: Take 1 capsule (300 mg total) by mouth 2 (two) times a day   ipratropium (ATROVENT HFA) 17 mcg/act inhaler   No No   Sig: Inhale 2 puffs 4 (four) times a day as needed for wheezing   Patient not taking:  Reported on 9/13/2024   lactulose (CHRONULAC) 10 g/15 mL solution Past Week  No Yes   Sig: Take 30 mL (20 g total) by mouth 3 (three) times a day   magnesium hydroxide (MILK OF MAGNESIA) 400 mg/5 mL oral suspension   Yes No   Sig: Take by mouth daily as needed for constipation   metoprolol tartrate (LOPRESSOR) 25 mg tablet 7/5/2025  No Yes   Sig: Take 0.5 tablets (12.5 mg total) by mouth daily   nicotine (NICODERM CQ) 14 mg/24hr TD 24 hr patch   No No   Sig: Place 1 patch on the skin over 24 hours daily   Patient not taking: Reported on 6/21/2024   pantoprazole (PROTONIX) 40 mg tablet   No No   Sig: Take 1 tablet (40 mg total) by mouth daily   polyethylene glycol (MIRALAX) 17 g packet  Outside Facility (Specify) Yes No   Sig: Take 17 g by mouth daily If no bowel movement in 72 hours   spironolactone (ALDACTONE) 50 mg tablet 7/5/2025 Self Yes Yes   Sig: Take 50 mg by mouth in the morning and 50 mg in the evening. Taking daily.   thiamine (VITAMIN B1) 100 mg tablet   Yes No   Sig: Take 100 mg by mouth daily      Facility-Administered Medications: None     Patient's Medications   Discharge Prescriptions    AMOXICILLIN-CLAVULANATE (AUGMENTIN) 875-125 MG PER TABLET    Take 1 tablet by mouth every 12 (twelve) hours for 7 days       Start Date: 7/5/2025  End Date: 7/12/2025       Order Dose: 1 tablet       Quantity: 14 tablet    Refills: 0     No discharge procedures on file.  ED SEPSIS DOCUMENTATION   Time reflects when diagnosis was documented in both MDM as applicable and the Disposition within this note       Time User Action Codes Description Comment    7/5/2025  6:56 PM Nuria Caicedo Add [L03.031] Cellulitis of great toe, right                      [1]   Past Medical History:  Diagnosis Date    Anemia     Anxiety     Ascites     Cervical cancer (HCC)     Chronic pain     Cirrhosis (HCC)     CVA (cerebral vascular accident) (HCC)     Depression     Fibromyalgia     Hepatic encephalopathy (HCC)     Opioid  dependence in remission (HCC) 11/7/2022   [2]   Past Surgical History:  Procedure Laterality Date    COLONOSCOPY  07/14/2015    COLONOSCOPY  03/15/2013    Hemorrhoids    EGD  12/23/2014    HYSTERECTOMY      CA AMPUTATION TOE INTERPHALANGEAL JOINT Right 6/7/2024    Procedure: AMPUTATION RIGHT GREAT TOE;  Surgeon: Niru Baldwin DPM;  Location:  MAIN OR;  Service: Podiatry    CA AMPUTATION TOE INTERPHALANGEAL JOINT Right 9/3/2024    Procedure: AMPUTATION RIGHT 2ND TOE;  Surgeon: Niru Baldwin DPM;  Location:  MAIN OR;  Service: Podiatry   [3]   Family History  Problem Relation Name Age of Onset    Lung cancer Mother      Cirrhosis Father      Cancer Sister      Colon polyps Neg Hx      Colon cancer Neg Hx     [4]   Social History  Tobacco Use    Smoking status: Every Day     Passive exposure: Current    Smokeless tobacco: Never   Vaping Use    Vaping status: Never Used   Substance Use Topics    Alcohol use: Yes     Alcohol/week: 14.0 standard drinks of alcohol     Types: 14 Cans of beer per week     Comment: Had been drinking anywhere from 3-5 drinks of hard alcohol daily for the past 2 years, quit 8/17    Drug use: Never        Nuria Caicedo PA-C  07/05/25 1957

## 2025-07-05 NOTE — DISCHARGE INSTRUCTIONS
Rest, elevate foot.  Take antibiotic twice a day for 7 days.  Follow up with podiatrist and wound care this week.  Return to ER if symptoms worsen.

## 2025-07-06 LAB
ATRIAL RATE: 62 BPM
P AXIS: 44 DEGREES
PR INTERVAL: 130 MS
QRS AXIS: 69 DEGREES
QRSD INTERVAL: 76 MS
QT INTERVAL: 416 MS
QTC INTERVAL: 422 MS
T WAVE AXIS: 29 DEGREES
VENTRICULAR RATE: 62 BPM

## 2025-07-06 PROCEDURE — 93010 ELECTROCARDIOGRAM REPORT: CPT | Performed by: INTERNAL MEDICINE

## 2025-07-06 NOTE — ED NOTES
Pt assisted to waiting room. Ride share ride ordered. Pt provided with sandwiches and discharge instructions. Denies any further needs at this time.      Opal Murphy RN  07/05/25 2737

## 2025-07-06 NOTE — ED NOTES
Pt ambulated around room. Pulse ox started at 88% on RA, HR 74. Pt denied SOB. Oxygen dropped to 86%, continued to deny SOB. HR 76, c/o heart racing.      Opal Murphy RN  07/05/25 9627

## 2025-07-06 NOTE — ED CARE HANDOFF
Emergency Department Sign Out Note        Sign out and transfer of care from Nuria Caicedo. See Separate Emergency Department note.     The patient, Finn Oleary, was evaluated by the previous provider for right great toe pain.    Workup Completed:  Patient discharged with oral antibiotics. Awaiting transportation home    ED Course / Workup Pending (followup):  Patient discharged at which time she began reporting increased shortness of breath which she feels will not improve with her inhalers and lightheadedness.  Nebulizer treatment given.  No supplemental oxygen requirement.  Ambulatory pulse ox as low as 86% on room air with recovery upon rest.  Orthostatics with decrease from lying to sitting; however, stable from sitting to standing.  Patient reports that she feels comfortable going home at this time.        No data recorded                             Procedures  Medical Decision Making  Amount and/or Complexity of Data Reviewed  Labs: ordered.  Radiology: ordered and independent interpretation performed.    Risk  Prescription drug management.            Disposition  Final diagnoses:   Cellulitis of great toe, right     Time reflects when diagnosis was documented in both MDM as applicable and the Disposition within this note       Time User Action Codes Description Comment    7/5/2025  6:56 PM Nuria Caicedo Add [L03.031] Cellulitis of great toe, right           ED Disposition       ED Disposition   Discharge    Condition   Stable    Date/Time   Sat Jul 5, 2025  7:47 PM    Comment   Finn Oleary discharge to home/self care.                   Follow-up Information       Follow up With Specialties Details Why Contact Info Additional Information    Niru Baldwin DPM Podiatry, Wound Care Call in 2 days For recheck [address] [facility]  [address] [place] [address]  [phone]       [facility] Wound Care Schedule an appointment as soon as possible for a visit  For  recheck [address]  [facility] [address]  [phone] [facility], [address] [place], [place], [address]   [phone]          Patient's Medications   Discharge Prescriptions    AMOXICILLIN-CLAVULANATE (AUGMENTIN) 875-125 MG PER TABLET    Take 1 tablet by mouth every 12 (twelve) hours for 7 days       Start Date: 7/5/2025  End Date: 7/12/2025       Order Dose: 1 tablet       Quantity: 14 tablet    Refills: 0     No discharge procedures on file.       ED Provider  Electronically Signed by     Charlene Osei MD  07/05/25 8628

## 2025-07-06 NOTE — ED NOTES
RN attempted to discharge pt to [facility]. Pt initially reported being able to care for herself. Upon discharge pt c/o SOB. States she can't walk to her bathroom, has no food in her home, and is unable to  medications. Pt medicated per orders. Provider made aware.             Opal Murphy RN  07/05/25 2034

## 2025-07-10 LAB
BACTERIA BLD CULT: NORMAL
BACTERIA BLD CULT: NORMAL

## 2025-07-15 ENCOUNTER — OFFICE VISIT (OUTPATIENT)
Dept: PSYCHIATRY | Facility: CLINIC | Age: 70
End: 2025-07-15
Payer: MEDICARE

## 2025-07-15 DIAGNOSIS — F41.1 GENERALIZED ANXIETY DISORDER: ICD-10-CM

## 2025-07-15 PROCEDURE — 99213 OFFICE O/P EST LOW 20 MIN: CPT | Performed by: STUDENT IN AN ORGANIZED HEALTH CARE EDUCATION/TRAINING PROGRAM

## 2025-07-15 PROCEDURE — 90833 PSYTX W PT W E/M 30 MIN: CPT | Performed by: STUDENT IN AN ORGANIZED HEALTH CARE EDUCATION/TRAINING PROGRAM

## 2025-07-15 RX ORDER — ALPRAZOLAM 0.5 MG
TABLET ORAL
Qty: 66 TABLET | Refills: 5 | Status: SHIPPED | OUTPATIENT
Start: 2025-07-15

## 2025-07-21 NOTE — PSYCH
MEDICATION MANAGEMENT NOTE        [facility]      Name and Date of Birth:  Finn Oleary 69 y.o. 1955 MRN: [id number]    Date of Visit: July 21, 2025    Reason for Visit: Follow-up visit for medication management       SUBJECTIVE:    Finn Oleayr is a 69 y.o. female with past psychiatric history significant for MDD who was personally seen and evaluated today at the [facility] outpatient clinic for follow-up and medication management. Finn states that her depression and anxiety are about the same as our previous appointment.  She denies SI, HI, AVH, delusions, moustapha since we last met.  Patient stated that she was mood wise the same as our last appointment and holding up well despite her physical struggles.  After discussion of risks, benefits, potential side effects, alternatives, patient continues to absolutely be averse to any changes to her psychotropics and states that she wants to focus on her physical health at this time.  She denies acute mental health complaints concerns at this time.        Current Rating Scores:     None completed today.    Review Of Systems:      Constitutional negative   ENT Denies   Cardiovascular negative   Respiratory negative   Gastrointestinal negative   Genitourinary negative   Musculoskeletal knee pain and toe pain   Integumentary negative   Neurological neuropathic pain   Endocrine negative   Other Symptoms none, all other systems are negative       Past Psychiatric History: (unchanged information from previous note copied and italicized) - Information that is bolded has been updated.   See intake      Substance Abuse History: (unchanged information from previous note copied and italicized) - Information that is bolded has been updated.     See intake    Social History: (unchanged information from previous note copied and italicized) - Information that is bolded has been updated.   See  intake      Traumatic History: (unchanged information from previous note copied and italicized) - Information that is bolded has been updated.     See intake      Past Medical History:    Past Medical History:   Diagnosis Date    Anemia     Anxiety     Ascites     Cervical cancer (HCC)     Chronic pain     Cirrhosis (HCC)     CVA (cerebral vascular accident) (HCC)     Depression     Fibromyalgia     Hepatic encephalopathy (HCC)     Opioid dependence in remission (HCC) 11/7/2022        Past Surgical History:   Procedure Laterality Date    COLONOSCOPY  07/14/2015    COLONOSCOPY  03/15/2013    Hemorrhoids    EGD  12/23/2014    HYSTERECTOMY      IN AMPUTATION TOE INTERPHALANGEAL JOINT Right 6/7/2024    Procedure: AMPUTATION RIGHT GREAT TOE;  Surgeon: Niru Baldwin DPM;  Location: [facility] OR;  Service: Podiatry    IN AMPUTATION TOE INTERPHALANGEAL JOINT Right 9/3/2024    Procedure: AMPUTATION RIGHT 2ND TOE;  Surgeon: Niru Baldwin DPM;  Location: [facility];  Service: Podiatry     Allergies   Allergen Reactions    Gadolinium     Ibuprofen Other (See Comments)     increased BP    Nsaids     Other Palpitations     All anti depressants       Substance Abuse History:    Social History     Substance and Sexual Activity   Alcohol Use Yes    Alcohol/week: 14.0 standard drinks of alcohol    Types: 14 Cans of beer per week    Comment: Had been drinking anywhere from 3-5 drinks of hard alcohol daily for the past 2 years, quit 8/17     Social History     Substance and Sexual Activity   Drug Use Never       Social History:    Social History     Socioeconomic History    Marital status:      Spouse name: Not on file    Number of children: Not on file    Years of education: Not on file    Highest education level: Not on file   Occupational History    Not on file   Tobacco Use    Smoking status: Every Day     Passive exposure: Current    Smokeless tobacco: Never   Vaping Use    Vaping status: Never Used   Substance and  "Sexual Activity    Alcohol use: Yes     Alcohol/week: 14.0 standard drinks of alcohol     Types: 14 Cans of beer per week     Comment: Had been drinking anywhere from 3-5 drinks of hard alcohol daily for the past 2 years, quit 8/17    Drug use: Never    Sexual activity: Not on file   Other Topics Concern    Not on file   Social History Narrative    Not on file     Social Drivers of Health     Financial Resource Strain: Not on file   Food Insecurity: No Food Insecurity (9/3/2024)    Nursing - Inadequate Food Risk Classification     Worried About Running Out of Food in the Last Year: Never true     Ran Out of Food in the Last Year: Never true     Ran Out of Food in the Last Year: Not on file   Transportation Needs: No Transportation Needs (9/3/2024)    PRAPARE - Transportation     Lack of Transportation (Medical): No     Lack of Transportation (Non-Medical): No   Physical Activity: Not on file   Stress: Not on file   Social Connections: Not on file   Intimate Partner Violence: Not on file   Housing Stability: Low Risk  (9/3/2024)    Housing Stability Vital Sign     Unable to Pay for Housing in the Last Year: No     Number of Times Moved in the Last Year: 1     Homeless in the Last Year: No       Family Psychiatric History:     Family History   Problem Relation Name Age of Onset    Lung cancer Mother      Cirrhosis Father      Cancer Sister      Colon polyps Neg Hx      Colon cancer Neg Hx         History Review: The following portions of the patient's history were reviewed and updated as appropriate: allergies, current medications, past family history, past medical history, past social history, past surgical history, and problem list.         OBJECTIVE:     Vital signs in last 24 hours:    There were no vitals filed for this visit.    Mental Status Evaluation:    Appearance age appropriate, casually dressed   Behavior cooperative, mildly anxious   Speech normal rate, normal volume, normal pitch   Mood \"Okay\"   Affect " constricted   Thought Processes organized, goal directed   Associations intact associations   Thought Content no overt delusions   Perceptual Disturbances: no auditory hallucinations, no visual hallucinations   Abnormal Thoughts  Risk Potential Suicidal ideation - None  Homicidal ideation - None  Potential for aggression - No   Orientation oriented to person, place, time/date, and situation   Memory recent and remote memory grossly intact   Consciousness alert and awake   Attention Span Concentration Span attention span and concentration are age appropriate   Intellect appears to be of average intelligence   Insight intact   Judgement intact   Muscle Strength and  Gait decreased muscle strength, slow gait   Motor activity no abnormal movements   Language no difficulty naming common objects, no difficulty repeating a phrase   Fund of Knowledge adequate knowledge of current events  adequate fund of knowledge regarding past history   Pain mild   Pain Scale Did not ask patient to formally rate       Laboratory Results: I have personally reviewed all pertinent laboratory/tests results    Recent Labs (last 2 months):   Admission on 07/05/2025, Discharged on 07/05/2025   Component Date Value    Ventricular Rate 07/05/2025 62     Atrial Rate 07/05/2025 62     NV Interval 07/05/2025 130     QRSD Interval 07/05/2025 76     QT Interval 07/05/2025 416     QTC Interval 07/05/2025 422     P Axis 07/05/2025 44     QRS [place] 07/05/2025 69     T Wave [place] 07/05/2025 29     WBC 07/05/2025 4.65     RBC 07/05/2025 4.39     Hemoglobin 07/05/2025 14.1     Hematocrit 07/05/2025 43.8     MCV 07/05/2025 100 (H)     MCH 07/05/2025 32.1     MCHC 07/05/2025 32.2     RDW 07/05/2025 12.6     MPV 07/05/2025 9.7     Platelets 07/05/2025 176     nRBC 07/05/2025 0     Segmented % 07/05/2025 49     Immature Grans % 07/05/2025 0     Lymphocytes % 07/05/2025 31     Monocytes % 07/05/2025 11     Eosinophils Relative 07/05/2025 8 (H)     Basophils  Relative 07/05/2025 1     Absolute Neutrophils 07/05/2025 2.30     Absolute Immature Grans 07/05/2025 0.01     Absolute Lymphocytes 07/05/2025 1.43     Absolute Monocytes 07/05/2025 0.51     Eosinophils Absolute 07/05/2025 0.35     Basophils Absolute 07/05/2025 0.05     Protime 07/05/2025 16.0 (H)     INR 07/05/2025 1.23 (H)     PTT 07/05/2025 32     Sodium 07/05/2025 141     Potassium 07/05/2025 4.1     Chloride 07/05/2025 102     CO2 07/05/2025 35 (H)     ANION GAP 07/05/2025 4     BUN 07/05/2025 6     Creatinine 07/05/2025 0.60     Glucose 07/05/2025 107     Calcium 07/05/2025 10.0     Corrected Calcium 07/05/2025 10.5 (H)     AST 07/05/2025 31     ALT 07/05/2025 14     Alkaline Phosphatase 07/05/2025 108 (H)     Total Protein 07/05/2025 6.7     Albumin 07/05/2025 3.4 (L)     Total Bilirubin 07/05/2025 1.35 (H)     eGFR 07/05/2025 92     Blood Culture 07/05/2025 No Growth After 5 Days.     Blood Culture 07/05/2025 No Growth After 5 Days.     LACTIC ACID 07/05/2025 1.7     Procalcitonin 07/05/2025 <0.05     CRP 07/05/2025 6.9 (H)     Sed Rate 07/05/2025 7     Color, UA 07/05/2025 Light Yellow     Clarity, UA 07/05/2025 Clear     Specific Gravity, UA 07/05/2025 <1.005 (L)     pH, UA 07/05/2025 8.0     Leukocytes, UA 07/05/2025 Negative     Nitrite, UA 07/05/2025 Negative     Protein, UA 07/05/2025 Negative     Glucose, UA 07/05/2025 Negative     Ketones, UA 07/05/2025 Negative     Urobilinogen, UA 07/05/2025 <2.0     Bilirubin, UA 07/05/2025 Negative     Occult Blood, UA 07/05/2025 Negative     hs TnI 0hr 07/05/2025 3        Suicide/Homicide Risk Assessment:    Risk of Harm to Self:  The following ratings are based on assessment at the time of the interview  Historical Risk Factors include: chronic psychiatric problems  Protective Factors: no current suicidal ideation, compliant with medications, compliant with mental health treatment, having a desire to be alive, stable living environment    Risk of Harm to  Others:  The following ratings are based on assessment at the time of the interview  Historical Risk Factors include: none.  Protective Factors: no current homicidal ideation    The following interventions are recommended: contracts for safety at present - agrees to go to ED if feeling unsafe, contracts for safety at present - agrees to call Crisis Intervention Service if feeling unsafe      Lethality Statement:    Based on today's assessment and clinical criteria, Finn Oleary contracts for safety and is not an imminent risk of harm to self or others. Outpatient level of care is deemed appropriate at this current time. Finn understands that if they can no longer contract for safety, they need to call the office or report to their nearest Emergency Room for immediate evaluation. They voiced understanding and agreement to call 911 or head to the nearest ED should they have any physical or mental decompensation whatsoever.       Assessment/Plan:     1.)  TOO  2.)    3.)    After discussion of risks, benefits, and side effects, alternatives, patient declines to change her medication regimen in any way and will continue to be given 66 tablets/month of Xanax.  Intent is to allow patient twice daily doses with an additional 6 doses in case of particularly difficult days.    She will follow up with resources given to her by clinical navigation and will follow up with her other medical specialists.  She denies acute mental health complaints or concerns at this time.            Medications Risks/Benefits      Risks, Benefits And Possible Side Effects Of Medications:    Risks, benefits, and possible side effects of medications explained to Finn and she verbalizes understanding and agreement for treatment.    Controlled Medication Discussion:     Finn has been filling controlled prescriptions on time as prescribed according to [place] Prescription Drug Monitoring Program    Psychotherapy Provided:     Individual  psychotherapy provided: Importance of medication and treatment compliance reviewed with Finn.  Importance of follow up with family physician for medical issues reviewed with Finn.  Crisis/safety plan discussed with Finn.   Provided at least 16 minutes of distinct psychotherapy using a combination of supportive, interpersonal, motivational, and problem solving approaches to address psychological distress and enhance coping strategies.       Treatment Plan:    Completed and signed during the session: We will perform in next appointment due to lack of time today      Visit Time    Visit Start Time: 1:00 PM  Visit Stop Time: 1:28 PM  Total Visit Duration: 28 minutes     The total visit duration detailed above includes: patient engagement, medication management, psychotherapy/counseling, discussion regarding treatment goals, documentation, review of past medical records, and coordination of care.      Note Share Disclaimer:     This note was not shared with the patient due to reasonable likelihood of causing patient harm      Ana Paula Cadena DO  Psychiatry  07/21/25

## 2025-08-19 PROBLEM — K21.9 GASTRO-ESOPHAGEAL REFLUX DISEASE WITHOUT ESOPHAGITIS: Status: ACTIVE | Noted: 2025-08-19

## 2025-08-19 PROBLEM — I48.91 UNSPECIFIED ATRIAL FIBRILLATION (HCC): Status: ACTIVE | Noted: 2025-08-19

## 2025-08-19 PROBLEM — E55.9 VITAMIN D DEFICIENCY, UNSPECIFIED: Status: ACTIVE | Noted: 2025-08-19

## 2025-08-19 PROBLEM — K70.9 ALCOHOLIC LIVER DISEASE, UNSPECIFIED (HCC): Status: ACTIVE | Noted: 2025-08-19

## 2025-08-19 PROBLEM — G45.9 TRANSIENT CEREBRAL ISCHEMIC ATTACK, UNSPECIFIED: Status: ACTIVE | Noted: 2025-08-19

## 2025-08-19 PROBLEM — M79.7 FIBROMYALGIA: Status: ACTIVE | Noted: 2025-08-19

## 2025-08-19 PROBLEM — I69.359 HEMIPLEGIA AND HEMIPARESIS FOLLOWING CEREBRAL INFARCTION AFFECTING UNSPECIFIED SIDE (HCC): Status: ACTIVE | Noted: 2025-08-19

## 2025-08-19 PROBLEM — B18.2 CHRONIC VIRAL HEPATITIS C (HCC): Status: ACTIVE | Noted: 2025-08-19

## 2025-08-19 PROBLEM — R00.2 PALPITATIONS: Status: ACTIVE | Noted: 2025-08-19

## 2025-08-19 PROBLEM — F13.20 SEDATIVE, HYPNOTIC OR ANXIOLYTIC DEPENDENCE, UNCOMPLICATED (HCC): Status: ACTIVE | Noted: 2025-08-19

## 2025-08-19 PROBLEM — G47.00 INSOMNIA, UNSPECIFIED: Status: ACTIVE | Noted: 2025-08-19

## 2025-08-19 PROBLEM — Z86.73 HISTORY OF CVA (CEREBROVASCULAR ACCIDENT): Status: ACTIVE | Noted: 2025-08-19

## (undated) DEVICE — BANDAGE, ESMARK LF STR 6"X9' (20/CS): Brand: CYPRESS

## (undated) DEVICE — GLOVE SRG BIOGEL 7

## (undated) DEVICE — ACE WRAP 4 IN UNSTERILE

## (undated) DEVICE — BETHLEHEM UNIV MAJ EXT ,KIT: Brand: CARDINAL HEALTH

## (undated) DEVICE — KERLIX BANDAGE ROLL: Brand: KERLIX

## (undated) DEVICE — INTENDED FOR TISSUE SEPARATION, AND OTHER PROCEDURES THAT REQUIRE A SHARP SURGICAL BLADE TO PUNCTURE OR CUT.: Brand: BARD-PARKER ® CARBON RIB-BACK BLADES

## (undated) DEVICE — GLOVE INDICATOR PI UNDERGLOVE SZ 6.5 BLUE

## (undated) DEVICE — PLUMEPEN PRO 10FT

## (undated) DEVICE — GLOVE INDICATOR PI UNDERGLOVE SZ 8.5 BLUE

## (undated) DEVICE — GLOVE SRG BIOGEL 8.5

## (undated) DEVICE — CURITY NON-ADHERENT STRIPS: Brand: CURITY

## (undated) DEVICE — CUFF TOURNIQUET 18 X 4 IN QUICK CONNECT DISP 1 BLADDER

## (undated) DEVICE — HYDROGEN PEROXIDE 3 PCT 16 OZ

## (undated) DEVICE — ABDOMINAL PAD: Brand: DERMACEA

## (undated) DEVICE — GLOVE SRG BIOGEL ECLIPSE 6.5

## (undated) DEVICE — CHLORAPREP HI-LITE 26ML ORANGE

## (undated) DEVICE — PAD GROUNDING DUAL ADULT

## (undated) DEVICE — GLOVE INDICATOR PI UNDERGLOVE SZ 7.5 BLUE

## (undated) DEVICE — CURITY STRETCH BANDAGE: Brand: CURITY

## (undated) DEVICE — GAUZE SPONGES,16 PLY: Brand: CURITY